# Patient Record
Sex: MALE | Race: WHITE | NOT HISPANIC OR LATINO | Employment: OTHER | URBAN - NONMETROPOLITAN AREA
[De-identification: names, ages, dates, MRNs, and addresses within clinical notes are randomized per-mention and may not be internally consistent; named-entity substitution may affect disease eponyms.]

---

## 2021-01-08 ENCOUNTER — CLINICAL SUPPORT (OUTPATIENT)
Dept: CARDIOLOGY | Facility: CLINIC | Age: 81
End: 2021-01-08

## 2021-01-08 DIAGNOSIS — I48.0 PAF (PAROXYSMAL ATRIAL FIBRILLATION) (HCC): Primary | ICD-10-CM

## 2021-01-08 LAB — INR PPP: 2.6 (ref 0.9–1.1)

## 2021-01-08 PROCEDURE — 36416 COLLJ CAPILLARY BLOOD SPEC: CPT | Performed by: INTERNAL MEDICINE

## 2021-01-08 PROCEDURE — 85610 PROTHROMBIN TIME: CPT | Performed by: INTERNAL MEDICINE

## 2021-01-08 NOTE — PROGRESS NOTES
Completed PT/INR today. Results were 2.6. Patient is within normal range. Patient will return in one month. Patient did not need refill today.

## 2021-01-22 ENCOUNTER — ANTICOAGULATION VISIT (OUTPATIENT)
Dept: CARDIOLOGY | Facility: CLINIC | Age: 81
End: 2021-01-22

## 2021-01-22 DIAGNOSIS — I48.0 PAF (PAROXYSMAL ATRIAL FIBRILLATION) (HCC): Primary | ICD-10-CM

## 2021-01-22 PROCEDURE — 85610 PROTHROMBIN TIME: CPT | Performed by: INTERNAL MEDICINE

## 2021-01-22 PROCEDURE — 36416 COLLJ CAPILLARY BLOOD SPEC: CPT | Performed by: INTERNAL MEDICINE

## 2021-02-08 ENCOUNTER — CLINICAL SUPPORT (OUTPATIENT)
Dept: CARDIOLOGY | Facility: CLINIC | Age: 81
End: 2021-02-08

## 2021-02-08 DIAGNOSIS — I10 ESSENTIAL HYPERTENSION, BENIGN: ICD-10-CM

## 2021-02-08 DIAGNOSIS — I48.0 PAF (PAROXYSMAL ATRIAL FIBRILLATION) (HCC): Primary | ICD-10-CM

## 2021-02-08 LAB — INR PPP: 2.8 (ref 0.9–1.1)

## 2021-02-08 PROCEDURE — 85610 PROTHROMBIN TIME: CPT | Performed by: INTERNAL MEDICINE

## 2021-02-08 PROCEDURE — 36416 COLLJ CAPILLARY BLOOD SPEC: CPT | Performed by: INTERNAL MEDICINE

## 2021-02-08 NOTE — PROGRESS NOTES
pts inr was 2.8 with a range of 2-3 pt reports taking 2.5mg of warfarin on tuesdays Wednesdays and Thursdays and 5mg on the remaining days. Told pt to recheck in 1 month.

## 2021-02-09 LAB
ALBUMIN SERPL-MCNC: 4 G/DL (ref 3.7–4.7)
ALBUMIN/GLOB SERPL: 1.1 {RATIO} (ref 1.2–2.2)
ALP SERPL-CCNC: 62 IU/L (ref 39–117)
ALT SERPL-CCNC: 13 IU/L (ref 0–44)
AST SERPL-CCNC: 22 IU/L (ref 0–40)
BASOPHILS # BLD AUTO: 0 X10E3/UL (ref 0–0.2)
BASOPHILS NFR BLD AUTO: 0 %
BILIRUB SERPL-MCNC: 0.4 MG/DL (ref 0–1.2)
BUN SERPL-MCNC: 24 MG/DL (ref 8–27)
BUN/CREAT SERPL: 20 (ref 10–24)
CALCIUM SERPL-MCNC: 9.2 MG/DL (ref 8.6–10.2)
CHLORIDE SERPL-SCNC: 102 MMOL/L (ref 96–106)
CHOLEST SERPL-MCNC: 155 MG/DL (ref 100–199)
CO2 SERPL-SCNC: 23 MMOL/L (ref 20–29)
CREAT SERPL-MCNC: 1.18 MG/DL (ref 0.76–1.27)
EOSINOPHIL # BLD AUTO: 0.2 X10E3/UL (ref 0–0.4)
EOSINOPHIL NFR BLD AUTO: 3 %
ERYTHROCYTE [DISTWIDTH] IN BLOOD BY AUTOMATED COUNT: 14.5 % (ref 11.6–15.4)
GLOBULIN SER CALC-MCNC: 3.6 G/DL (ref 1.5–4.5)
GLUCOSE SERPL-MCNC: 92 MG/DL (ref 65–99)
HCT VFR BLD AUTO: 32.9 % (ref 37.5–51)
HDLC SERPL-MCNC: 31 MG/DL
HGB BLD-MCNC: 10.8 G/DL (ref 13–17.7)
IMM GRANULOCYTES # BLD AUTO: 0 X10E3/UL (ref 0–0.1)
IMM GRANULOCYTES NFR BLD AUTO: 0 %
LDLC SERPL CALC-MCNC: 87 MG/DL (ref 0–99)
LYMPHOCYTES # BLD AUTO: 1.4 X10E3/UL (ref 0.7–3.1)
LYMPHOCYTES NFR BLD AUTO: 28 %
MCH RBC QN AUTO: 30 PG (ref 26.6–33)
MCHC RBC AUTO-ENTMCNC: 32.8 G/DL (ref 31.5–35.7)
MCV RBC AUTO: 91 FL (ref 79–97)
MONOCYTES # BLD AUTO: 0.6 X10E3/UL (ref 0.1–0.9)
MONOCYTES NFR BLD AUTO: 12 %
NEUTROPHILS # BLD AUTO: 2.8 X10E3/UL (ref 1.4–7)
NEUTROPHILS NFR BLD AUTO: 57 %
PLATELET # BLD AUTO: 198 X10E3/UL (ref 150–450)
POTASSIUM SERPL-SCNC: 5.6 MMOL/L (ref 3.5–5.2)
PROT SERPL-MCNC: 7.6 G/DL (ref 6–8.5)
RBC # BLD AUTO: 3.6 X10E6/UL (ref 4.14–5.8)
SODIUM SERPL-SCNC: 140 MMOL/L (ref 134–144)
TRIGL SERPL-MCNC: 216 MG/DL (ref 0–149)
VLDLC SERPL CALC-MCNC: 37 MG/DL (ref 5–40)
WBC # BLD AUTO: 4.9 X10E3/UL (ref 3.4–10.8)

## 2021-02-11 ENCOUNTER — OFFICE VISIT (OUTPATIENT)
Dept: CARDIOLOGY | Facility: CLINIC | Age: 81
End: 2021-02-11

## 2021-02-11 VITALS
RESPIRATION RATE: 12 BRPM | WEIGHT: 215 LBS | HEIGHT: 74 IN | SYSTOLIC BLOOD PRESSURE: 120 MMHG | BODY MASS INDEX: 27.59 KG/M2 | HEART RATE: 69 BPM | DIASTOLIC BLOOD PRESSURE: 54 MMHG

## 2021-02-11 DIAGNOSIS — I48.0 PAF (PAROXYSMAL ATRIAL FIBRILLATION) (HCC): Primary | ICD-10-CM

## 2021-02-11 DIAGNOSIS — E78.5 HYPERLIPIDEMIA LDL GOAL <100: ICD-10-CM

## 2021-02-11 DIAGNOSIS — I10 ESSENTIAL HYPERTENSION: ICD-10-CM

## 2021-02-11 PROCEDURE — 99443 PR PHYS/QHP TELEPHONE EVALUATION 21-30 MIN: CPT | Performed by: INTERNAL MEDICINE

## 2021-02-11 RX ORDER — CHLORAL HYDRATE 500 MG
CAPSULE ORAL
COMMUNITY

## 2021-02-11 NOTE — PROGRESS NOTES
[facility]  [facility]ARDIOLOGY  [address] [place] [address]  Dept: [phone]  Dept Fax: [phone]    Kurt Houser  1940    Televisit Note    You have chosen to receive care through a telephone visit. Do you consent to use a telephone visit for your medical care today? Yes    History of Present Illness:  Kurt Houser is a 80 y.o. male who presents via phone for Follow-up. Paroxysmal atrial fibrillation, He seems doing well, no complaints, he is off Coreg due to Bradycardia, on Warfarin, no complaints    The following portions of the patient's history were reviewed and updated as appropriate: allergies, current medications, past family history, past medical history, past social history, past surgical history and problem list.    This visit was scheduled as a phone visit to comply with patient safety concerns in accordance with CDC recommendations.  Time spent talking to  the patient was 30 minutes.     Medications  amLODIPine  B-12 capsule  cholecalciferol  CoQ-10 capsule  fish oil capsule  gabapentin  lisinopril  meloxicam  pravastatin  warfarin    Subjective  Allergies   Allergen Reactions   • Acetaminophen Mental Status Change   • Hydrocodone Bitart (Antituss) [Hydrocodone] Mental Status Change        Past Medical History:   Diagnosis Date   • Acute osteomyelitis of foot (CMS/HCC)    • Amputated toe of left foot (CMS/HCC)    • Anemia of decreased vitamin B12 absorption    • BMI 27.0-27.9,adult    • Degenerative joint disease involving multiple joints    • Diverticular disease of colon    • Drug therapy    • Dyslipidemia    • Essential hypertension    • Foot osteomyelitis (CMS/HCC)    • Frequent PVCs    • Glaucoma    • High risk medication use    • History of adenomatous polyp of colon    • Long term current use of anticoagulant    • Low density lipoprotein (LDL) cholesterol level below 100mg/dL    • Mixed hyperlipidemia    • Neuropathy    • Osteoarthritis    •  "Paroxysmal atrial fibrillation (CMS/HCC)    • Polyneuropathy    • Prediabetes    • Ventricular premature beats    • Vitamin B12 deficiency    • Vitamin D deficiency        Past Surgical History:   Procedure Laterality Date   • CATARACT EXTRACTION, BILATERAL     • FOOT SURGERY Left    • TRANS METATARSAL AMPUTATION  2020    5th metatarsal amputation/IV abx   • WRIST SURGERY Left        Family History   Problem Relation Age of Onset   • Colon cancer Father    • Liver cancer Brother         Social History     Socioeconomic History   • Marital status:      Spouse name: Not on file   • Number of children: Not on file   • Years of education: Not on file   • Highest education level: Not on file   Tobacco Use   • Smoking status: Former Smoker     Packs/day: 1.00     Years: 27.00     Pack years: 27.00     Types: Cigarettes, Cigars     Start date:      Quit date:      Years since quittin.1   • Smokeless tobacco: Never Used   • Tobacco comment: 1 cigar/day, 2.40 years   Substance and Sexual Activity   • Alcohol use: Not Currently     Frequency: Never     Comment: about every 1-2 years he mighst have a drink   • Drug use: Never   • Sexual activity: Defer       Cardiovascular Procedures    ECHO/MUGA:   STRESS TESTS:   CARDIAC CATH:   DEVICES:   HOLTER:   CT/MRI:   VASCULAR:   CARDIOTHORACIC:     Objective  Vitals:    21 1104   BP: 120/54  Comment: at home   BP Location: Left arm   Patient Position: Sitting   Cuff Size: Adult   Pulse: 69   Resp: 12   Weight: 97.5 kg (215 lb)   Height: 188 cm (74\")   PainSc: 0-No pain     Body mass index is 27.6 kg/m².    Assessment and Plan  Diagnoses and all orders for this visit:    PAF (paroxysmal atrial fibrillation) (CMS/HCC)- Asymptomatic, on warfarin, will keep same meds     Essential hypertension- The BP is fine on Lisinopril 40 mg and also Amlodipine 5mg,    Hyperlipidemia LDL goal <100- On Pravastatin, LDL is good although Try are elevated,   Elevated " K- recent la K is 5,6. First time, will repeat lab next week with EKG    Other orders  -     Omega-3 Fatty Acids (fish oil) 1000 MG capsule capsule; Take  by mouth Daily With Breakfast.        No follow-ups on file.    Buddy Javier MD  02/11/2021

## 2021-02-23 ENCOUNTER — LAB (OUTPATIENT)
Dept: CARDIOLOGY | Facility: CLINIC | Age: 81
End: 2021-02-23

## 2021-02-23 DIAGNOSIS — I10 ESSENTIAL HYPERTENSION: ICD-10-CM

## 2021-02-24 LAB
BUN SERPL-MCNC: 15 MG/DL (ref 8–27)
BUN/CREAT SERPL: 13 (ref 10–24)
CALCIUM SERPL-MCNC: 9.2 MG/DL (ref 8.6–10.2)
CHLORIDE SERPL-SCNC: 104 MMOL/L (ref 96–106)
CO2 SERPL-SCNC: 23 MMOL/L (ref 20–29)
CREAT SERPL-MCNC: 1.12 MG/DL (ref 0.76–1.27)
GLUCOSE SERPL-MCNC: 94 MG/DL (ref 65–99)
POTASSIUM SERPL-SCNC: 4.8 MMOL/L (ref 3.5–5.2)
SODIUM SERPL-SCNC: 141 MMOL/L (ref 134–144)

## 2021-02-26 ENCOUNTER — TELEPHONE (OUTPATIENT)
Dept: CARDIOLOGY | Facility: CLINIC | Age: 81
End: 2021-02-26

## 2021-03-23 ENCOUNTER — CLINICAL SUPPORT (OUTPATIENT)
Dept: CARDIOLOGY | Facility: CLINIC | Age: 81
End: 2021-03-23

## 2021-03-23 DIAGNOSIS — I48.0 PAF (PAROXYSMAL ATRIAL FIBRILLATION) (HCC): Primary | ICD-10-CM

## 2021-03-23 LAB — INR PPP: 3.2 (ref 0.9–1.1)

## 2021-03-23 PROCEDURE — 36416 COLLJ CAPILLARY BLOOD SPEC: CPT | Performed by: INTERNAL MEDICINE

## 2021-03-23 PROCEDURE — 85610 PROTHROMBIN TIME: CPT | Performed by: INTERNAL MEDICINE

## 2021-03-23 NOTE — PROGRESS NOTES
pts reported regimen is 2.5mg of warfarin on Tuesday, Wednesday, and Thursday and 5mg the rest of the week, I changed him to 2.5mg on Friday and eat more greens and to rtc in 1 week

## 2021-03-24 ENCOUNTER — ANTICOAGULATION VISIT (OUTPATIENT)
Dept: PHARMACY | Facility: HOSPITAL | Age: 81
End: 2021-03-24

## 2021-03-24 DIAGNOSIS — I48.0 PAF (PAROXYSMAL ATRIAL FIBRILLATION) (HCC): ICD-10-CM

## 2021-03-24 NOTE — PROGRESS NOTES
"Anticoagulation Clinic - Remote Progress Note  [facility]    Indication: Afib  Referring Provider: Buddy Schwarz  Initial Warfarin Start Date:   Goal INR: 2-3  Current Drug Interactions: omega three fatty acid; co-q10  [id number]:   Bleed Risk:   Other:     Diet: Mixed cooked greens/ salads 3/24/2021  Alcohol: None  Tobacco: None  OTC Pain Medication: APAP PRN    INR History:  Date 3/23         Total Weekly Dose 27.5mg         INR 3.2         Notes            Phone Interview:  Tablet Strength: 5mg  Patient Contact Info: [phone] (Mobile)  Estimated OOP cost: [please send to registration if not already done]  Verbal Release Auth:   Lab Contact Info:     Patient Findings  Negatives:  Signs/symptoms of thrombosis, Signs/symptoms of bleeding, Laboratory test error suspected, Change in health, Change in alcohol use, Change in activity, Upcoming invasive procedure, Emergency department visit, Upcoming dental procedure, Missed doses, Extra doses, Change in medications, Change in diet/appetite, Hospital admission, Bruising, Other complaints   Comments:  Instructions per [facility] appointment yesterday. \"Pts reported regimen is 2.5mg of warfarin on Tuesday, Wednesday, and Thursday and 5mg the rest of the week, I changed him to 2.5mg on Friday and eat more greens and to rtc in 1 week\"       Welcomed Kurt Houser to the [facility]. I introduced myself and discussed that we will assist with his warfarin monitoring and work with his cardiologist or other physicians to provide patient care. Encouraged patient to call the clinic with requests for warfarin refills, changes in medications / diet, change in health, or upcoming procedures / surgeries. Discussed use of OTC pain medications, and alcohol / tobacco / dietary / other drug interactions in relation to warfarin. Explained that [facility] will now be managing his INR with her cardiologist moving " forward. The method in which she tests will remain the same, however, labs will come to the [facility]. We will then call the patient with results. Advised patient that if she does not receive a phone call from [facility] within 24 hours to call the clinic to inquire about his recent INR test. Furthermore, we discussed available dates to come to the [facility] for face to face meetings. Patient was agreeable to meeting twice per year. At this time, He did not have further questions or concerns. Provided patient with the clinic's contact information for future reference.    Plan:  1. INR is slightly SUPRAtherapeutic today. Instructed pt to follow instructions per [facility]- decrease dose to 2.5mg daily except 5mg Sat-Mon. Discussed patient should not go over board with GLV and to eat them as he normally does. Will likely need to spread out high and low warfarin dosing moving forward.   2. Repeat INR 3/30 as directed by Dr. Javier's office.  3. Pt declines warfarin refills.  4. Verbal information provided over the phone to Kurt Houser. Kurt Houser expresses understanding by teach back, RBV dosing instructions, and has no further questions at this time.    Laura Ames, PharmD  3/24/2021  13:26 EDT

## 2021-03-30 ENCOUNTER — ANTICOAGULATION VISIT (OUTPATIENT)
Dept: PHARMACY | Facility: HOSPITAL | Age: 81
End: 2021-03-30

## 2021-03-30 ENCOUNTER — CLINICAL SUPPORT (OUTPATIENT)
Dept: CARDIOLOGY | Facility: CLINIC | Age: 81
End: 2021-03-30

## 2021-03-30 DIAGNOSIS — I48.0 PAF (PAROXYSMAL ATRIAL FIBRILLATION) (HCC): ICD-10-CM

## 2021-03-30 DIAGNOSIS — I48.0 PAF (PAROXYSMAL ATRIAL FIBRILLATION) (HCC): Primary | ICD-10-CM

## 2021-03-30 LAB — INR PPP: 2.7 (ref 0.9–1.1)

## 2021-03-30 PROCEDURE — 36416 COLLJ CAPILLARY BLOOD SPEC: CPT | Performed by: PHYSICIAN ASSISTANT

## 2021-03-30 PROCEDURE — 85610 PROTHROMBIN TIME: CPT | Performed by: PHYSICIAN ASSISTANT

## 2021-03-30 NOTE — PROGRESS NOTES
Anticoagulation Clinic - Remote Progress Note  [facility]    Indication: Afib  Referring Provider: Buddy Schwarz  Initial Warfarin Start Date:   Goal INR: 2-3  Current Drug Interactions: omega three fatty acid; co-q10  [id number]: 3 (HTN, Age)  Bleed Risk:   Other:     Diet: Mixed cooked greens/ salads 3/24/2021  Alcohol: None  Tobacco: None  OTC Pain Medication: APAP PRN    INR History:  Date 3/23 3/30        Total Weekly Dose 27.5mg 25mg        INR 3.2 2.7        Notes            Phone Interview:  Tablet Strength: 5mg  Patient Contact Info: [phone] (Mobile)  Estimated OOP cost: will send if patient comes to [place]   Verbal Release Auth:   Lab Contact Info:     Patient Findings  Negatives:  Signs/symptoms of thrombosis, Signs/symptoms of bleeding, Laboratory test error suspected, Change in health, Change in alcohol use, Change in activity, Upcoming invasive procedure, Emergency department visit, Upcoming dental procedure, Missed doses, Extra doses, Change in medications, Change in diet/appetite, Hospital admission, Bruising, Other complaints     Plan:  1. INR is therapeutic today. Instructed  And Mrs. Houser to spread out higher doses amongst lower doses of warfarin- therefore will take 2.5mg daily except 5mg MonWedSat.  2. Repeat INR 4/8/2021.  3. Pt declines warfarin refills.  4. Verbal information provided over the phone to Kurt Houser. Kurt Houser expresses understanding by teach back, RBV dosing instructions, and has no further questions at this time.      Laura Ames, PharmD  3/30/2021  14:16 EDT

## 2021-04-01 ENCOUNTER — TELEPHONE (OUTPATIENT)
Dept: CARDIOLOGY | Facility: CLINIC | Age: 81
End: 2021-04-01

## 2021-04-01 NOTE — TELEPHONE ENCOUNTER
Called and spoke with Mr. Houser. He was advised to repeat his INR on 4/8/21. Advised him he needed an appointment.    He would like to know what time he needs to come in? If you schedule him, I would be happy to call him back and let him know what time to be there.

## 2021-04-08 ENCOUNTER — CLINICAL SUPPORT (OUTPATIENT)
Dept: CARDIOLOGY | Facility: CLINIC | Age: 81
End: 2021-04-08

## 2021-04-08 ENCOUNTER — ANTICOAGULATION VISIT (OUTPATIENT)
Dept: PHARMACY | Facility: HOSPITAL | Age: 81
End: 2021-04-08

## 2021-04-08 DIAGNOSIS — I48.0 PAF (PAROXYSMAL ATRIAL FIBRILLATION) (HCC): ICD-10-CM

## 2021-04-08 DIAGNOSIS — I48.0 PAF (PAROXYSMAL ATRIAL FIBRILLATION) (HCC): Primary | ICD-10-CM

## 2021-04-08 LAB — INR PPP: 3.2 (ref 0.9–1.1)

## 2021-04-08 PROCEDURE — 36416 COLLJ CAPILLARY BLOOD SPEC: CPT | Performed by: INTERNAL MEDICINE

## 2021-04-08 PROCEDURE — 85610 PROTHROMBIN TIME: CPT | Performed by: INTERNAL MEDICINE

## 2021-04-08 NOTE — PROGRESS NOTES
Anticoagulation Clinic - Remote Progress Note  [facility]    Indication: Afib  Referring Provider: Buddy Schwarz  Initial Warfarin Start Date:   Goal INR: 2-3  Current Drug Interactions: omega three fatty acid; co-q10  [id number]: 3 (HTN, Age)  Bleed Risk:   Other:     Diet: QOD mixed cooked greens/ salads 4/8/21  Alcohol: None  Tobacco: None  OTC Pain Medication: APAP PRN    INR History:  Date 3/23 3/30 4/8       Total Weekly Dose 27.5mg 25mg 25 mg       INR 3.2 2.7 3.2       Notes   DAWNA orantes 4/8         Phone Interview:  Tablet Strength: 5 mg  Patient Contact Info: [phone] (Mobile)  Estimated OOP cost: will send if patient comes to [place]   Verbal Release Auth:   Lab Contact Info:     Patient Findings:  Positives:  Change in medications   Negatives:  Signs/symptoms of thrombosis, Signs/symptoms of bleeding, Laboratory test error suspected, Change in health, Change in alcohol use, Change in activity, Upcoming invasive procedure, Emergency department visit, Upcoming dental procedure, Missed doses, Extra doses, Change in diet/appetite, Hospital admission, Bruising, Other complaints   Comments:  Has been on doxycyline 100 mg BID for about 4 weeks for an infected toe nail. He says his GLV intake has been consistent. Denies any other changes.     Plan:  1. INR is supra therapeutic today. Instructed Mr. Houser to spread out higher doses amongst lower doses of warfarin- therefore will take 2.5mg daily except 5mg MonWedSat. He will eat a serving of greens tonight as well.  2. Repeat INR in one week.  3. Patient declines warfarin refills.  4. Verbal information provided over the phone to Kurt Houser. Kurt Houser expresses understanding by teach back, RBV dosing instructions, and has no further questions at this time.    Kari Ortega, Pharmacy Technician  4/8/2021  10:54 EDT     I, Laura Ames, PharmD, have reviewed the note in full and agree with the assessment and  plan.  04/08/21  12:01 EDT

## 2021-04-16 ENCOUNTER — ANTICOAGULATION VISIT (OUTPATIENT)
Dept: PHARMACY | Facility: HOSPITAL | Age: 81
End: 2021-04-16

## 2021-04-16 ENCOUNTER — CLINICAL SUPPORT (OUTPATIENT)
Dept: CARDIOLOGY | Facility: CLINIC | Age: 81
End: 2021-04-16

## 2021-04-16 DIAGNOSIS — I48.0 PAF (PAROXYSMAL ATRIAL FIBRILLATION) (HCC): Primary | ICD-10-CM

## 2021-04-16 LAB — INR PPP: 2 (ref 0.9–1.1)

## 2021-04-16 NOTE — PROGRESS NOTES
Anticoagulation Clinic - Remote Progress Note  [facility]    Indication: Afib  Referring Provider: Buddy Schwarz  Initial Warfarin Start Date:   Goal INR: 2-3  Current Drug Interactions: omega three fatty acid; co-q10  [id number]: 3 (HTN, Age)  Bleed Risk:   Other:     Diet: QOD mixed cooked greens/ salads 4/8/21  Alcohol: None  Tobacco: None  OTC Pain Medication: APAP PRN    INR History:  Date 3/23 3/30 4/8 4/16      Total Weekly Dose 27.5mg 25mg 25 mg 25 mg      INR 3.2 2.7 3.2 2.0      Notes   DAWNA orantes 4/8         Phone Interview:  Tablet Strength: 5 mg  Patient Contact Info: [phone] (Mobile)  Estimated OOP cost: will send if patient comes to [place]   Verbal Release Auth:   Lab Contact Info:     Patient Findings  Positives:  Change in diet/appetite   Negatives:  Signs/symptoms of thrombosis, Signs/symptoms of bleeding, Laboratory test error suspected, Change in health, Change in alcohol use, Change in activity, Upcoming invasive procedure, Emergency department visit, Upcoming dental procedure, Missed doses, Extra doses, Change in medications, Hospital admission, Bruising, Other complaints   Comments:  Mr. Houser reports that he has been eating two tablespoons of greens every other day. This was started last week. He is going to decrease it to one tablespoon every other day.     Plan:  1. INR is back WNL at [facility] today. Instructed Mr. Houser continue maintenance dose of warfarin 2.5 mg daily except 5 mg on MonWedSat until recheck. He plans to decrease GLV intake to one tablespoon every other day.  2. Repeat INR in one week.  3. Patient declines warfarin refills.  4. Verbal information provided over the phone to Kurt Houser. Kurt Houser expresses understanding by teach back, RBV dosing instructions, and has no further questions at this time.    Laura Ames, PharmD  4/16/2021  11:35 EDT

## 2021-04-23 ENCOUNTER — CLINICAL SUPPORT (OUTPATIENT)
Dept: CARDIOLOGY | Facility: CLINIC | Age: 81
End: 2021-04-23

## 2021-04-23 ENCOUNTER — ANTICOAGULATION VISIT (OUTPATIENT)
Dept: PHARMACY | Facility: HOSPITAL | Age: 81
End: 2021-04-23

## 2021-04-23 DIAGNOSIS — I48.0 PAF (PAROXYSMAL ATRIAL FIBRILLATION) (HCC): Primary | ICD-10-CM

## 2021-04-23 LAB — INR PPP: 2.4 (ref 0.9–1.1)

## 2021-04-23 NOTE — PROGRESS NOTES
Anticoagulation Clinic - Remote Progress Note  [facility]    Indication: Afib  Referring Provider: Buddy Schwarz  Initial Warfarin Start Date:   Goal INR: 2-3  Current Drug Interactions: omega three fatty acid; co-q10  [id number]: 3 (HTN, Age)  Bleed Risk:   Other:     Diet: QOD mixed cooked greens/ salads 4/8/21  Alcohol: None  Tobacco: None  OTC Pain Medication: APAP PRN    INR History:  Date 3/23 3/30 4/8 4/16 4/23     Total Weekly Dose 27.5mg 25mg 25 mg 25 mg 25mg     INR 3.2 2.7 3.2 2.0 2.4     Notes   DAWNA orantes 4/8         Phone Interview:  Tablet Strength: 5 mg  Patient Contact Info: [phone] (Mobile)  Estimated OOP cost: will send if patient comes to [place]   Verbal Release Auth:   Lab Contact Info:   Patient Findings    Negatives:  Signs/symptoms of thrombosis, Signs/symptoms of bleeding, Laboratory test error suspected, Change in health, Change in alcohol use, Change in activity, Upcoming invasive procedure, Emergency department visit, Upcoming dental procedure, Missed doses, Extra doses, Change in medications, Change in diet/appetite, Hospital admission, Bruising, Other complaints         Plan:  1. INR is 2.4 today.  Mr. Houser is to continue maintenance dose of warfarin 2.5 mg daily except 5 mg on MonWedSat until recheck.   2. Repeat INR in 2 weeks.  3. Verbal information provided over the phone to Kurt Houser. Kurt Houser expresses understanding by teach back, RBV dosing instructions, and has no further questions at this time.    Samira Colvin CPhT  4/23/2021  15:13 EDT     Can check q4w if WNL next encounter  I, Regine Adame, PharmD, have reviewed the note in full and agree with the assessment and plan.  04/23/21  16:20 EDT

## 2021-05-07 ENCOUNTER — CLINICAL SUPPORT (OUTPATIENT)
Dept: CARDIOLOGY | Facility: CLINIC | Age: 81
End: 2021-05-07

## 2021-05-07 ENCOUNTER — ANTICOAGULATION VISIT (OUTPATIENT)
Dept: PHARMACY | Facility: HOSPITAL | Age: 81
End: 2021-05-07

## 2021-05-07 DIAGNOSIS — I48.0 PAF (PAROXYSMAL ATRIAL FIBRILLATION) (HCC): Primary | ICD-10-CM

## 2021-05-07 LAB — INR PPP: 2.7 (ref 0.9–1.1)

## 2021-05-07 NOTE — PROGRESS NOTES
Anticoagulation Clinic - Remote Progress Note  [facility]    Indication: Afib  Referring Provider: Buddy Javier  Initial Warfarin Start Date:   Goal INR: 2.0-3.0  Current Drug Interactions: omega three fatty acid; co-q10  [id number]: 3 (HTN, Age)      Diet: QOD mixed cooked greens/ salads 4/8/21  Alcohol: None  Tobacco: None  OTC Pain Medication: APAP PRN    INR History:  Date 3/23 3/30 4/8 4/16 4/23 5/7         Total Weekly Dose 27.5mg 25mg 25mg 25mg 25mg 25mg         INR 3.2 2.7 3.2 2.0 2.4 2.7         Notes   LD doxy 4/8              Phone Interview:  Tablet Strength: 5mg  Patient Contact Info: [phone] (Mobile)  Estimated OOP cost: will send if patient comes to [place]   Verbal Release Auth:   Lab Contact Info:     Patient Findings  Negatives:  Signs/symptoms of thrombosis, Signs/symptoms of bleeding, Laboratory test error suspected, Change in health, Change in alcohol use, Change in activity, Upcoming invasive procedure, Emergency department visit, Upcoming dental procedure, Missed doses, Extra doses, Change in medications, Change in diet/appetite, Hospital admission, Bruising, Other complaints     Plan:  1. INR is therapeutic today at 2.7. Instructed Mr. Houser to continue warfarin 2.5mg oral daily except 5mg on MonWedSat until recheck.   2. Repeat INR in 4 weeks, 6/4  3. Verbal information provided over the phone. Kurt Houser RBV dosing instructions, expresses understanding by teach back, and has no further questions at this time.    Micky Barnett CPhT  5/7/2021  14:17 EDT      I, Regine Adame, PharmD, have reviewed the note in full and agree with the assessment and plan.  05/07/21  15:57 EDT

## 2021-06-04 ENCOUNTER — ANTICOAGULATION VISIT (OUTPATIENT)
Dept: PHARMACY | Facility: HOSPITAL | Age: 81
End: 2021-06-04

## 2021-06-04 ENCOUNTER — CLINICAL SUPPORT (OUTPATIENT)
Dept: CARDIOLOGY | Facility: CLINIC | Age: 81
End: 2021-06-04

## 2021-06-04 DIAGNOSIS — I48.0 PAF (PAROXYSMAL ATRIAL FIBRILLATION) (HCC): Primary | ICD-10-CM

## 2021-06-04 LAB — INR PPP: 1.8 (ref 0.9–1.1)

## 2021-06-04 NOTE — PROGRESS NOTES
Anticoagulation Clinic - Remote Progress Note  [facility]    Indication: Afib  Referring Provider: Buddy Javier  Initial Warfarin Start Date:   Goal INR: 2.0-3.0  Current Drug Interactions: omega three fatty acid; co-q10  [id number]: 3 (HTN, Age)      Diet: QOD mixed cooked greens/ salads 6/4/21  Alcohol: None  Tobacco: None  OTC Pain Medication: APAP PRN    INR History:  Date 3/23 3/30 4/8 4/16 4/23 5/7 6/4        Total Weekly Dose 27.5mg 25mg 25mg 25mg 25mg 25mg 25 mg        INR 3.2 2.7 3.2 2.0 2.4 2.7 1.8        Notes   LD doxy 4/8    Inc GLV          Phone Interview:  Tablet Strength: 5mg  Patient Contact Info: [phone] (Mobile)  Estimated OOP cost: will send if patient comes to [place]   Verbal Release Auth:   Lab Contact Info:     Patient Findings:  Positives:  Change in diet/appetite   Negatives:  Signs/symptoms of thrombosis, Signs/symptoms of bleeding, Laboratory test error suspected, Change in health, Change in alcohol use, Change in activity, Upcoming invasive procedure, Emergency department visit, Upcoming dental procedure, Missed doses, Extra doses, Change in medications, Hospital admission, Bruising, Other complaints   Comments:  Slight increase in cooked greens this week. Will resume normal diet and be consistent.     Plan:  1. INR is sub therapeutic today at 1.8. Spoke with David Retana, PharmD, and instructed Mr. Houser to continue warfarin 2.5 mg daily except 5 mg on MonWedSat until recheck.   2. Repeat INR in two weeks, 6/18.  3. Verbal information provided over the phone. Kurt Houser RBV dosing instructions, expresses understanding by teach back, and has no further questions at this time.    Kari Ortega CPhT  6/4/2021  15:57 EDT    I, Micky Yeboah, PharmD, have reviewed the note in full and agree with the assessment and plan.  06/04/21  16:14 EDT

## 2021-06-04 NOTE — PROGRESS NOTES
Anticoagulation Clinic - Remote Progress Note  [facility]    Indication: Afib  Referring Provider: Buddy Javier  Initial Warfarin Start Date:   Goal INR: 2.0-3.0  Current Drug Interactions: omega three fatty acid; co-q10  [id number]: 3 (HTN, Age)      Diet: QOD mixed cooked greens/ salads 4/8/21  Alcohol: None  Tobacco: None  OTC Pain Medication: APAP PRN    INR History:  Date 3/23 3/30 4/8 4/16 4/23 5/7 6/4        Total Weekly Dose 27.5mg 25mg 25mg 25mg 25mg 25mg 25mg        INR 3.2 2.7 3.2 2.0 2.4 2.7 1.8        Notes   LD doxverito 4/8              Phone Interview:  Tablet Strength: 5mg  Patient Contact Info: [phone] (Mobile)  Estimated OOP cost: will send if patient comes to [place]   Verbal Release Auth:   Lab Contact Info:     DISREGARD PLAN BELOW UNTIL PATIENT IS CONTACTED    Plan:  1. INR is subtherapeutic today at 1.8. Instructed Mr. Houser to continue warfarin 2.5mg oral daily except 5mg on MonWedSat until recheck.   2. Repeat INR in 4 weeks, 6/4  3. Verbal information provided over the phone. Kurt Houser RBV dosing instructions, expresses understanding by teach back, and has no further questions at this time.    Thank you,    David Retana PharmD, MARIIA  6/4/2021  13:56 EDT

## 2021-06-18 ENCOUNTER — CLINICAL SUPPORT (OUTPATIENT)
Dept: CARDIOLOGY | Facility: CLINIC | Age: 81
End: 2021-06-18

## 2021-06-18 ENCOUNTER — ANTICOAGULATION VISIT (OUTPATIENT)
Dept: PHARMACY | Facility: HOSPITAL | Age: 81
End: 2021-06-18

## 2021-06-18 DIAGNOSIS — I48.0 PAF (PAROXYSMAL ATRIAL FIBRILLATION) (HCC): Primary | ICD-10-CM

## 2021-06-18 LAB — INR PPP: 1.8 (ref 0.9–1.1)

## 2021-06-18 PROCEDURE — 36416 COLLJ CAPILLARY BLOOD SPEC: CPT | Performed by: INTERNAL MEDICINE

## 2021-06-18 PROCEDURE — 85610 PROTHROMBIN TIME: CPT | Performed by: INTERNAL MEDICINE

## 2021-06-18 NOTE — PROGRESS NOTES
Anticoagulation Clinic - Remote Progress Note  [facility]    Indication: Afib  Referring Provider: Buddy Javier  Initial Warfarin Start Date:   Goal INR: 2.0-3.0  Current Drug Interactions: omega three fatty acid; co-q10  [id number]: 3 (HTN, Age)    Diet: QOD mixed cooked greens/ salads 6/18/21  Alcohol: None  Tobacco: None  OTC Pain Medication: APAP PRN    INR History:  Date 3/23 3/30 4/8 4/16 4/23 5/7 6/4 6/18       Total Weekly Dose 27.5mg 25mg 25mg 25mg 25mg 25mg 25 mg        INR 3.2 2.7 3.2 2.0 2.4 2.7 1.8 1.8       Notes   LD doxy 4/8    Inc GLV          Phone Interview:  Tablet Strength: 5mg  Patient Contact Info: [phone] (Mobile)  Estimated OOP cost: will send if patient comes to [place]   Verbal Release Auth:   Lab Contact Info:     Patient Findings  Positives:  Change in diet/appetite   Negatives:  Signs/symptoms of thrombosis, Signs/symptoms of bleeding, Laboratory test error suspected, Change in health, Change in alcohol use, Change in activity, Upcoming invasive procedure, Emergency department visit, Upcoming dental procedure, Missed doses, Extra doses, Change in medications, Hospital admission, Bruising, Other complaints   Comments:  Mr. Houser reports that he had more GLV than he typically does. He eats about a table spoon of cooked greens every other day.      Plan:  1. INR is subtherapeutic again today at 1.8. Discussed I prefer to increase dose, however, he would prefer to decrease his GLV intake. Instructed Mr. Houser to BOOST dose of warfarin tonight to 5mg and then continue warfarin 2.5 mg daily except 5 mg on MonWedSat until recheck. If it returns SUBtherapeutic then will increase dose more permanently.   2. Repeat INR in two weeks, 7/2.  3. Verbal information provided over the phone. Kurt Houser RBV dosing instructions, expresses understanding by teach back, and has no further questions at this time.    Laura Ames, PharmD  6/18/2021  16:09 EDT

## 2021-07-02 ENCOUNTER — CLINICAL SUPPORT (OUTPATIENT)
Dept: CARDIOLOGY | Facility: CLINIC | Age: 81
End: 2021-07-02

## 2021-07-02 ENCOUNTER — ANTICOAGULATION VISIT (OUTPATIENT)
Dept: PHARMACY | Facility: HOSPITAL | Age: 81
End: 2021-07-02

## 2021-07-02 DIAGNOSIS — I48.0 PAF (PAROXYSMAL ATRIAL FIBRILLATION) (HCC): Primary | ICD-10-CM

## 2021-07-02 LAB — INR PPP: 2.3 (ref 0.9–1.1)

## 2021-07-02 PROCEDURE — 36416 COLLJ CAPILLARY BLOOD SPEC: CPT | Performed by: INTERNAL MEDICINE

## 2021-07-02 PROCEDURE — 85610 PROTHROMBIN TIME: CPT | Performed by: INTERNAL MEDICINE

## 2021-07-02 NOTE — PROGRESS NOTES
Anticoagulation Clinic - Remote Progress Note  [facility]    Indication: Afib  Referring Provider: Buddy Javier  Initial Warfarin Start Date:   Goal INR: 2.0-3.0  Current Drug Interactions: omega three fatty acid; co-q10  [id number]: 3 (HTN, Age)    Diet: QOD mixed cooked greens/ salads 7/2/21  Alcohol: None  Tobacco: None  OTC Pain Medication: APAP PRN    INR History:  Date 3/23 3/30 4/8 4/16 4/23 5/7 6/4 6/18 7/2      Total Weekly Dose 27.5mg 25mg 25mg 25mg 25mg 25mg 25 mg 25mg 25 mg      INR 3.2 2.7 3.2 2.0 2.4 2.7 1.8 1.8 2.3      Notes   LD doxy 4/8    Inc GLV  Less GLV        Phone Interview:  Tablet Strength: 5mg  Patient Contact Info: [phone] (Mobile)  Estimated OOP cost: will send if patient comes to [place]   Verbal Release Auth:   Lab Contact Info:     Patient Findings:  Positives:  Change in diet/appetite, Other complaints   Negatives:  Signs/symptoms of thrombosis, Signs/symptoms of bleeding, Laboratory test error suspected, Change in health, Change in alcohol use, Change in activity, Upcoming invasive procedure, Emergency department visit, Upcoming dental procedure, Missed doses, Extra doses, Change in medications, Hospital admission, Bruising   Comments:  Eating smaller portions of cooked greens. Otherwise denies any changes. Has been receiving a series of three shots in his knees. Last set was today. He isn't sure what they are injecting into his knees, may be gel.      Plan:  1. INR is back WNL today at 2.3. At this time, instructed Mr. Houser to continue maintenance dose of warfarin 2.5 mg daily except 5 mg on MonWedSat until recheck.   2. Repeat INR in ~ two weeks, 7/19. If WNL, would consider q4week checks.  3. Verbal information provided over the phone. Kurt Houser RBV dosing instructions, expresses understanding by teach back, and has no further questions at this time.    Kari Ortega, Evgeny  7/2/2021  16:20 EDT      I, Laura Ames, PharmD, have reviewed the note  in full and agree with the assessment and plan.  07/02/21  16:34 EDT

## 2021-07-19 ENCOUNTER — ANTICOAGULATION VISIT (OUTPATIENT)
Dept: PHARMACY | Facility: HOSPITAL | Age: 81
End: 2021-07-19

## 2021-07-19 ENCOUNTER — CLINICAL SUPPORT (OUTPATIENT)
Dept: CARDIOLOGY | Facility: CLINIC | Age: 81
End: 2021-07-19

## 2021-07-19 DIAGNOSIS — I48.0 PAF (PAROXYSMAL ATRIAL FIBRILLATION) (HCC): Primary | ICD-10-CM

## 2021-07-19 LAB — INR PPP: 1.9 (ref 0.9–1.1)

## 2021-07-19 PROCEDURE — 85610 PROTHROMBIN TIME: CPT | Performed by: INTERNAL MEDICINE

## 2021-07-19 PROCEDURE — 36416 COLLJ CAPILLARY BLOOD SPEC: CPT | Performed by: INTERNAL MEDICINE

## 2021-07-19 NOTE — PROGRESS NOTES
Anticoagulation Clinic - Remote Progress Note  [facility]    Indication: Afib  Referring Provider: Buddy Javier  Initial Warfarin Start Date:   Goal INR: 2.0-3.0  Current Drug Interactions: omega three fatty acid; co-q10  [id number]: 3 (HTN, Age)    Diet: QOD mixed cooked greens/ salads 7/2/21  Alcohol: None  Tobacco: None  OTC Pain Medication: APAP PRN    INR History:  Date 3/23 3/30 4/8 4/16 4/23 5/7 6/4 6/18 7/2 7/19     Total Weekly Dose 27.5mg 25mg 25mg 25mg 25mg 25mg 25 mg 25mg 25 mg 25 mg     INR 3.2 2.7 3.2 2.0 2.4 2.7 1.8 1.8 2.3 1.9     Notes   LD doxy 4/8    Inc GLV  Less GLV Inc GLV        Phone Interview:  Tablet Strength: 5mg  Patient Contact Info: [phone] (Mobile)  Estimated OOP cost: will send if patient comes to [place]   Verbal Release Auth:   Lab Contact Info:     Patient Findings:  Positives:  Change in diet/appetite   Negatives:  Signs/symptoms of thrombosis, Signs/symptoms of bleeding, Laboratory test error suspected, Change in health, Change in alcohol use, Change in activity, Upcoming invasive procedure, Emergency department visit, Upcoming dental procedure, Missed doses, Extra doses, Change in medications, Hospital admission, Bruising, Other complaints   Comments:  Patient stated that he had more greens last week than usual (lettuce, mixed greens, yfn slaw, and green beans).  He does not plan to eat as many greens this week.     Plan:  1. INR was Subtherapeutic yesterday at 1.9. At this time, instructed Mr. Houser to continue maintenance dose of warfarin 2.5 mg daily except 5 mg on MonWedSat AND will avoid GLV tonight. Patient said he was not going to eat as many greens this week.    2. Repeat INR in 1 week from last INR, 7/26. If subtherapeutic, an increase in his regimen may be needed, as his green vegetable consumption is not consistent.   3. Verbal information provided over the phone. Kurt Houser RBV dosing instructions, expresses understanding by teach back, and  has no further questions at this time.    Willian Lopez, Pharmacy Intern  7/19/2021  16:34 EDT    I, Laura Ames, PharmD, have reviewed the note in full and agree with the assessment and plan.  07/20/21  11:40 EDT

## 2021-07-28 ENCOUNTER — CLINICAL SUPPORT (OUTPATIENT)
Dept: CARDIOLOGY | Facility: CLINIC | Age: 81
End: 2021-07-28

## 2021-07-28 ENCOUNTER — ANTICOAGULATION VISIT (OUTPATIENT)
Dept: PHARMACY | Facility: HOSPITAL | Age: 81
End: 2021-07-28

## 2021-07-28 DIAGNOSIS — I48.0 PAF (PAROXYSMAL ATRIAL FIBRILLATION) (HCC): Primary | ICD-10-CM

## 2021-07-28 LAB — INR PPP: 1.9 (ref 0.9–1.1)

## 2021-07-28 NOTE — PROGRESS NOTES
Anticoagulation Clinic - Remote Progress Note  [facility]    Indication: Afib  Referring Provider: Buddy Javier  Initial Warfarin Start Date:   Goal INR: 2.0-3.0  Current Drug Interactions: omega three fatty acid; co-q10  [id number]: 3 (HTN, Age)    Diet: QOD mixed cooked greens/ salads 7/2/21  Alcohol: None  Tobacco: None  OTC Pain Medication: APAP PRN    INR History:  Date 3/23 3/30 4/8 4/16 4/23 5/7 6/4 6/18 7/2 7/19 7/28    Total Weekly Dose 27.5mg 25mg 25mg 25mg 25mg 25mg 25 mg 25mg 25 mg 25 mg 25 mg    INR 3.2 2.7 3.2 2.0 2.4 2.7 1.8 1.8 2.3 1.9 1.9    Notes   LD doxy 4/8    Inc GLV  Less GLV Inc GLV  Inc GLV      Phone Interview:  Tablet Strength: 5mg  Patient Contact Info: [phone] (Mobile)  Estimated OOP cost: will send if patient comes to [place]   Verbal Release Auth:   Lab Contact Info:     Patient Findings  Positives:  Change in diet/appetite   Negatives:  Signs/symptoms of thrombosis, Signs/symptoms of bleeding, Laboratory test error suspected, Change in health, Change in alcohol use, Change in activity, Upcoming invasive procedure, Emergency department visit, Upcoming dental procedure, Missed doses, Extra doses, Change in medications, Hospital admission, Bruising, Other complaints   Comments:  He eats GLV about every other day.  He feels like he ate less.     He got a list of foods that contain vitamin K yesterday from Dr. Javier's office.  He stated that he will no longer eats cooked greens.   Discussed the importance of consistent intake of foods that contain vitamin K each week.  Suggested keeping a food log initially.     Otherwise, above findings negative     Plan:  1. INR is SUBtherapeutic yesterday at 1.9.  Unable to reach Mr. Houser until today.  At this time, instructed Mr. Houser to continue maintenance dose of warfarin 2.5 mg oral daily except 5 mg on MonWedSat. Mr. Houser plans to monitor intake of foods that contain vitamin K.  2. Repeat INR in two weeks on 8/11  per patient preference.   3. Verbal information provided over the phone. Kurt Houser RBV dosing instructions, expresses understanding by teach back, and has no further questions at this time.    Amisha Santana, PharmD  7/28/2021  14:46 EDT

## 2021-08-11 ENCOUNTER — ANTICOAGULATION VISIT (OUTPATIENT)
Dept: PHARMACY | Facility: HOSPITAL | Age: 81
End: 2021-08-11

## 2021-08-11 DIAGNOSIS — I48.0 PAF (PAROXYSMAL ATRIAL FIBRILLATION) (HCC): Primary | ICD-10-CM

## 2021-08-12 NOTE — PROGRESS NOTES
Anticoagulation Clinic - Remote Progress Note  [facility]    Indication: paroxysmal afib  Referring Provider: Bdudy Javier  Initial Warfarin Start Date:   Goal INR: 2.0-3.0  Current Drug Interactions: omega three fatty acid; co-q10  [id number]: 3 (HTN, Age)    Diet: QOD mixed cooked greens / salads 7/2/21  Alcohol: None  Tobacco: None  OTC Pain Medication: APAP PRN    INR History:  Date 3/23 3/30 4/8 4/16 4/23 5/7 6/4 6/18 7/2 7/19 7/28 8/11   Total Weekly Dose 27.5mg 25mg 25mg 25mg 25mg 25mg 25 mg 25mg 25mg 25mg 25mg 25mg   INR 3.2 2.7 3.2 2.0 2.4 2.7 1.8 1.8 2.3 1.9 1.9 2.6   Notes   LD doxy 4/8    incr GLV  decr GLV incr GLV  incr GLV decr GLV     Date               Total WeeklyDose               INR               Notes                   Phone Interview:  Tablet Strength: 5mg  Patient Contact Info: [phone] (Mobile)  Estimated OOP cost: will send if patient comes to [place]   Verbal Release Auth:   Lab Contact Info:     Patient Findings  Positives:  Change in diet/appetite   Negatives:  Signs/symptoms of thrombosis, Signs/symptoms of bleeding, Laboratory test error suspected, Change in health, Change in alcohol use, Change in activity, Upcoming invasive procedure, Emergency department visit, Upcoming dental procedure, Missed doses, Extra doses, Change in medications, Hospital admission, Bruising, Other complaints   Comments:  Patient reports he has not eaten as much mixed greens recently, had previously been eating QOD. He is very agreeable to continue with current GLV intake which includes servings of green beans, peas, and salads. He may have mixed greens >1x/week. Otherwise denies findings.         Plan:  1. INR was therapeutic and back WNL yesterday at 2.6. Instructed Mr. Houser to continue with current GLV intake and then continue maintenance dose of warfarin 2.5mg oral daily except 5mg on MonWedSat until recheck  2. Repeat INR in two weeks, 8/25, to ensure WNL  3. Verbal information  provided over the phone. Kurt Houser RBV dosing instructions, expresses understanding by teach back, and has no further questions at this time.    Micky Barnett  8/12/2021  09:45 EDT       I, Micky Yeboah, PharmD, have reviewed the note in full and agree with the assessment and plan.  08/12/21  10:14 EDT

## 2021-08-25 ENCOUNTER — CLINICAL SUPPORT (OUTPATIENT)
Dept: CARDIOLOGY | Facility: CLINIC | Age: 81
End: 2021-08-25

## 2021-08-25 ENCOUNTER — ANTICOAGULATION VISIT (OUTPATIENT)
Dept: PHARMACY | Facility: HOSPITAL | Age: 81
End: 2021-08-25

## 2021-08-25 DIAGNOSIS — I48.0 PAF (PAROXYSMAL ATRIAL FIBRILLATION) (HCC): Primary | ICD-10-CM

## 2021-08-25 LAB — INR PPP: 2.1 (ref 0.9–1.1)

## 2021-08-25 NOTE — PROGRESS NOTES
Anticoagulation Clinic - Remote Progress Note  [facility]    Indication: paroxysmal afib  Referring Provider: Buddy Javier  Initial Warfarin Start Date:   Goal INR: 2.0-3.0  Current Drug Interactions: omega three fatty acid; co-q10  [id number]: 3 (HTN, Age)    Diet: green beans 2x weekly 8/25/21  Alcohol: None  Tobacco: None  OTC Pain Medication: APAP PRN    INR History:  Date 3/23 3/30 4/8 4/16 4/23 5/7 6/4 6/18 7/2 7/19 7/28 8/11   Total Weekly Dose 27.5mg 25mg 25mg 25mg 25mg 25mg 25 mg 25mg 25mg 25mg 25mg 25mg   INR 3.2 2.7 3.2 2.0 2.4 2.7 1.8 1.8 2.3 1.9 1.9 2.6   Notes   LD doxy 4/8    incr GLV  decr GLV incr GLV  incr GLV decr GLV     Date 8/25              Total WeeklyDose 25mg              INR 2.1              Notes                   Phone Interview:  Tablet Strength: 5mg  Patient Contact Info: [phone] (Mobile)  Estimated OOP cost: will send if patient comes to [place]   Verbal Release Auth:   Lab Contact Info:       Plan:  1. INR was therapeutic and back WNL yesterday at 2.1. Instructed Mr. Houser to continue with current GLV intake and then continue maintenance dose of warfarin 2.5mg oral daily except 5mg on MonWedSat until recheck  2. Repeat INR 9/22  3. Verbal information provided over the phone. Kurt Houser RBV dosing instructions, expresses understanding by teach back, and has no further questions at this time.    Thank you,    David Del AngelD, MARIIA, BCPS  8/25/2021  16:04 EDT

## 2021-09-22 ENCOUNTER — CLINICAL SUPPORT (OUTPATIENT)
Dept: CARDIOLOGY | Facility: CLINIC | Age: 81
End: 2021-09-22

## 2021-09-22 ENCOUNTER — ANTICOAGULATION VISIT (OUTPATIENT)
Dept: PHARMACY | Facility: HOSPITAL | Age: 81
End: 2021-09-22

## 2021-09-22 DIAGNOSIS — I48.0 PAF (PAROXYSMAL ATRIAL FIBRILLATION) (HCC): Primary | ICD-10-CM

## 2021-09-22 LAB — INR PPP: 2.8 (ref 0.9–1.1)

## 2021-09-22 PROCEDURE — 36416 COLLJ CAPILLARY BLOOD SPEC: CPT | Performed by: INTERNAL MEDICINE

## 2021-09-22 PROCEDURE — 85610 PROTHROMBIN TIME: CPT | Performed by: INTERNAL MEDICINE

## 2021-09-22 NOTE — PROGRESS NOTES
Anticoagulation Clinic - Remote Progress Note  [facility]    Indication: paroxysmal afib  Referring Provider: Buddy Javier  Initial Warfarin Start Date:   Goal INR: 2.0-3.0  Current Drug Interactions: omega three fatty acid; CoQ-10   [id number]: 3 (HTN, Age)    Diet: green beans 2x weekly 8/25/21  Alcohol: None  Tobacco: None  OTC Pain Medication: APAP PRN    INR History:  Date 3/23 3/30 4/8 4/16 4/23 5/7 6/4 6/18 7/2 7/19 7/28 8/11   Total Weekly Dose 27.5mg 25mg 25mg 25mg 25mg 25mg 25mg 25mg 25mg 25mg 25mg 25mg   INR 3.2 2.7 3.2 2.0 2.4 2.7 1.8 1.8 2.3 1.9 1.9 2.6   Notes   LD doxy 4/8    incr GLV  decr GLV incr GLV  incr GLV decr GLV     Date 8/25 9/22             Total WeeklyDose 25mg 25mg             INR 2.1 2.8             Notes                 Phone Interview:  Tablet Strength: 5mg  Patient Contact Info: [phone] (Mobile)  Estimated OOP cost: will send if patient comes to [place]   Verbal Release Auth:   Lab Contact Info: [facility] POCT    Patient Findings  Negatives:  Signs/symptoms of thrombosis, Signs/symptoms of bleeding, Laboratory test error suspected, Change in health, Change in alcohol use, Change in activity, Upcoming invasive procedure, Emergency department visit, Upcoming dental procedure, Missed doses, Extra doses, Change in medications, Change in diet/appetite, Hospital admission, Bruising, Other complaints     Plan:  1. INR is therapeutic today at 2.8. Instructed Mr. Houser to continue maintenance dose of warfarin 2.5mg oral daily except 5mg on MonWedSat until recheck  2. Repeat INR in 4 weeks, 10/20  3. Verbal information provided over the phone. Kurt Houser RBV dosing instructions, expresses understanding by teach back, and has no further questions at this time.    Micky Barnett, Evgeny  9/22/2021  15:07 EDT    I, David Retana, PharmD, have reviewed the note in full and agree with the assessment and plan.  09/22/21  15:39 EDT

## 2021-10-20 ENCOUNTER — CLINICAL SUPPORT (OUTPATIENT)
Dept: CARDIOLOGY | Facility: CLINIC | Age: 81
End: 2021-10-20

## 2021-10-20 ENCOUNTER — ANTICOAGULATION VISIT (OUTPATIENT)
Dept: PHARMACY | Facility: HOSPITAL | Age: 81
End: 2021-10-20

## 2021-10-20 DIAGNOSIS — I48.0 PAF (PAROXYSMAL ATRIAL FIBRILLATION) (HCC): Primary | ICD-10-CM

## 2021-10-20 LAB — INR PPP: 2.8 (ref 0.9–1.1)

## 2021-10-20 NOTE — PROGRESS NOTES
Anticoagulation Clinic - Remote Progress Note  [facility]    Indication: paroxysmal afib  Referring Provider: Buddy Javier  Initial Warfarin Start Date:   Goal INR: 2.0-3.0  Current Drug Interactions: omega three fatty acid; CoQ-10   [id number]: 3 (HTN, Age)    Diet: green beans 2x weekly 8/25/21  Alcohol: None  Tobacco: None  OTC Pain Medication: APAP PRN    INR History:  Date 3/23 3/30 4/8 4/16 4/23 5/7 6/4 6/18 7/2 7/19 7/28 8/11   Total Weekly Dose 27.5mg 25mg 25mg 25mg 25mg 25mg 25mg 25mg 25mg 25mg 25mg 25mg   INR 3.2 2.7 3.2 2.0 2.4 2.7 1.8 1.8 2.3 1.9 1.9 2.6   Notes   LD doxy 4/8    incr GLV  decr GLV incr GLV  incr GLV decr GLV     Date 8/25 9/22 10/20            Total WeeklyDose 25mg 25mg 25mg            INR 2.1 2.8 2.8            Notes                 Phone Interview:  Tablet Strength: 5mg  Patient Contact Info: [phone] (Mobile)  Estimated OOP cost: will send if patient comes to [place]   Verbal Release Auth:   Lab Contact Info: [facility] POCT    Patient Findings  Negatives:  Signs/symptoms of thrombosis, Signs/symptoms of bleeding, Laboratory test error suspected, Change in health, Change in alcohol use, Change in activity, Upcoming invasive procedure, Emergency department visit, Upcoming dental procedure, Missed doses, Extra doses, Change in medications, Change in diet/appetite, Hospital admission, Bruising, Other complaints       Plan:  1. INR is therapeutic today, again at 2.8. Instructed Mr. Houser to continue maintenance dose of warfarin 2.5mg oral daily except 5mg on MonWedSat until recheck  2. Repeat INR in 4 weeks, 11/17  3. Verbal information provided over the phone. Kurt Houser RBV dosing instructions, expresses understanding by teach back, and has no further questions at this time.    Micky Barnett, Evgeny  10/20/2021  14:09 EDT     I, Violet Frazier, PharmD, have reviewed the note in full and agree with the assessment and plan.  10/21/21  16:16 EDT

## 2021-11-18 ENCOUNTER — CLINICAL SUPPORT (OUTPATIENT)
Dept: CARDIOLOGY | Facility: CLINIC | Age: 81
End: 2021-11-18

## 2021-11-18 ENCOUNTER — ANTICOAGULATION VISIT (OUTPATIENT)
Dept: PHARMACY | Facility: HOSPITAL | Age: 81
End: 2021-11-18

## 2021-11-18 DIAGNOSIS — I48.0 PAF (PAROXYSMAL ATRIAL FIBRILLATION) (HCC): Primary | ICD-10-CM

## 2021-11-18 LAB — INR PPP: 3.1 (ref 0.9–1.1)

## 2021-11-18 PROCEDURE — 36416 COLLJ CAPILLARY BLOOD SPEC: CPT | Performed by: INTERNAL MEDICINE

## 2021-11-18 PROCEDURE — 85610 PROTHROMBIN TIME: CPT | Performed by: INTERNAL MEDICINE

## 2021-11-18 NOTE — PROGRESS NOTES
Anticoagulation Clinic - Remote Progress Note  Remote Lab    Indication: paroxysmal afib  Referring Provider: Buddy Javier  Initial Warfarin Start Date:   Goal INR: 2.0-3.0  Current Drug Interactions: omega three fatty acid; CoQ-10   [id number]: 3 (HTN, Age)    Diet: ~ Tablespoon QOD mixed cooked greens; green beans 2x weekly 11/19/2021  Alcohol: None  Tobacco: None  OTC Pain Medication: APAP PRN    INR History:  Date 3/23 3/30 4/8 4/16 4/23 5/7 6/4 6/18 7/2 7/19 7/28 8/11   Total Weekly Dose 27.5mg 25mg 25mg 25mg 25mg 25mg 25mg 25mg 25mg 25mg 25mg 25mg   INR 3.2 2.7 3.2 2.0 2.4 2.7 1.8 1.8 2.3 1.9 1.9 2.6   Notes   LD doxy 4/8    incr GLV  decr GLV incr GLV  incr GLV decr GLV     Date 8/25 9/22 10/20 11/18           Total WeeklyDose 25mg 25mg 25mg 25mg           INR 2.1 2.8 2.8 3.1           Notes    Less GLV             Phone Interview:  Tablet Strength: 5mg  Patient Contact Info: [phone] (Mobile)  Estimated OOP cost: will send if patient comes to [place]   Verbal Release Auth:   Lab Contact Info: [facility] POCT    Patient Findings  Positives:  Change in diet/appetite   Negatives:  Signs/symptoms of thrombosis, Signs/symptoms of bleeding, Laboratory test error suspected, Change in health, Change in alcohol use, Change in activity, Upcoming invasive procedure, Emergency department visit, Upcoming dental procedure, Missed doses, Extra doses, Change in medications, Hospital admission, Bruising, Other complaints   Comments:  He reports that he used to cooked greens QOD but hasn't done that for 2-3 weeks. Will plan to eat a serving tonight and then continue normal diet.     Plan:  1. INR is slightly supratherapeutic today at 3.1. Unable to get in touch with Mr. Houser until 11/19. Instructed Mr. Houser to eat a serving (2 tablespoons of cooked mixed greens) continue maintenance dose of warfarin 2.5mg oral daily except 5mg on MonWedSat until recheck  2. Repeat INR in 2 weeks 11/29 with   Concepcion's appointment.  3. Verbal information provided over the phone. Kurt Hosuer RBV dosing instructions, expresses understanding by teach back, and has no further questions at this time.    Laura Ames, PharmD  11/18/2021  14:43 EST

## 2021-11-29 ENCOUNTER — ANTICOAGULATION VISIT (OUTPATIENT)
Dept: PHARMACY | Facility: HOSPITAL | Age: 81
End: 2021-11-29

## 2021-11-29 ENCOUNTER — OFFICE VISIT (OUTPATIENT)
Dept: CARDIOLOGY | Facility: CLINIC | Age: 81
End: 2021-11-29

## 2021-11-29 VITALS
BODY MASS INDEX: 28.88 KG/M2 | TEMPERATURE: 97 F | HEART RATE: 58 BPM | DIASTOLIC BLOOD PRESSURE: 72 MMHG | SYSTOLIC BLOOD PRESSURE: 136 MMHG | HEIGHT: 74 IN | OXYGEN SATURATION: 99 % | WEIGHT: 225 LBS | RESPIRATION RATE: 11 BRPM

## 2021-11-29 DIAGNOSIS — I48.0 PAF (PAROXYSMAL ATRIAL FIBRILLATION) (HCC): Primary | ICD-10-CM

## 2021-11-29 DIAGNOSIS — I10 ESSENTIAL HYPERTENSION: ICD-10-CM

## 2021-11-29 DIAGNOSIS — E78.5 HYPERLIPIDEMIA LDL GOAL <100: ICD-10-CM

## 2021-11-29 LAB — INR PPP: 2.4 (ref 0.9–1.1)

## 2021-11-29 PROCEDURE — 99214 OFFICE O/P EST MOD 30 MIN: CPT | Performed by: INTERNAL MEDICINE

## 2021-11-29 NOTE — PROGRESS NOTES
[facility]  [facility]  [address] [place] [address]  Dept: [phone]  Dept Fax: [phone]    Kurt Houser  1940    Follow Up Office Visit Note    History of Present Illness:  Kurt Houser is a 81 y.o. male who presents to the clinic for Follow-up. Paroxysmal atrial fibrillation- He is asymptomatic, only on warfarin, no AV blockers    The following portions of the patient's history were reviewed and updated as appropriate: allergies, current medications, past family history, past medical history, past social history, past surgical history and problem list.    Medications:  amLODIPine  B-12 capsule  cholecalciferol  CoQ-10 capsule  fish oil capsule  gabapentin  lisinopril  meloxicam  pravastatin  warfarin    Subjective  Allergies   Allergen Reactions   • Acetaminophen Mental Status Change   • Hydrocodone Bitart (Antituss) [Hydrocodone] Mental Status Change        Past Medical History:   Diagnosis Date   • Acute osteomyelitis of foot (HCC)    • Amputated toe of left foot (HCC)    • Anemia of decreased vitamin B12 absorption    • BMI 27.0-27.9,adult    • Degenerative joint disease involving multiple joints    • Diverticular disease of colon    • Drug therapy    • Dyslipidemia    • Essential hypertension    • Foot osteomyelitis (HCC)    • Frequent PVCs    • Glaucoma    • High risk medication use    • History of adenomatous polyp of colon    • Long term current use of anticoagulant    • Low density lipoprotein (LDL) cholesterol level below 100mg/dL    • Mixed hyperlipidemia    • Neuropathy    • Osteoarthritis    • Paroxysmal atrial fibrillation (HCC)    • Polyneuropathy    • Prediabetes    • Ventricular premature beats    • Vitamin B12 deficiency    • Vitamin D deficiency        Past Surgical History:   Procedure Laterality Date   • CATARACT EXTRACTION, BILATERAL  2010   • FOOT SURGERY Left    • TRANS METATARSAL AMPUTATION  06/03/2020    5th metatarsal  "amputation/IV abx   • WRIST SURGERY Left        Family History   Problem Relation Age of Onset   • Colon cancer Father    • Liver cancer Brother         Social History     Socioeconomic History   • Marital status:    Tobacco Use   • Smoking status: Former Smoker     Packs/day: 1.00     Years: 27.00     Pack years: 27.00     Types: Cigarettes, Cigars     Start date:      Quit date:      Years since quittin.9   • Smokeless tobacco: Never Used   • Tobacco comment: 1 cigar/day, 2.40 years   Vaping Use   • Vaping Use: Never used   Substance and Sexual Activity   • Alcohol use: Not Currently     Comment: about every 1-2 years he mighst have a drink   • Drug use: Never   • Sexual activity: Defer       Review of Systems   Constitutional: Negative.    HENT: Negative.    Respiratory: Negative.    Cardiovascular: Negative.    Endocrine: Negative.    Genitourinary: Negative.    Musculoskeletal: Negative.    Skin: Negative.    Allergic/Immunologic: Negative.    Neurological: Negative.    Hematological: Negative.    Psychiatric/Behavioral: Negative.    All other systems reviewed and are negative.      Cardiovascular Procedures    ECHO/MUGA:   STRESS TESTS:   CARDIAC CATH:   DEVICES:   HOLTER:   CT/MRI:   VASCULAR:   CARDIOTHORACIC:     Objective  Vitals:    21 1429   BP: 136/72   BP Location: Right arm   Patient Position: Sitting   Cuff Size: Adult   Pulse: 58   Resp: 11   Temp: 97 °F (36.1 °C)   TempSrc: Infrared   SpO2: 99%   Weight: 102 kg (225 lb)   Height: 188 cm (74\")   PainSc: 0-No pain     Body mass index is 28.89 kg/m².     Physical Exam  Constitutional:       Appearance: Healthy appearance. Not in distress.   Neck:      Vascular: No JVR. JVD normal.   Pulmonary:      Effort: Pulmonary effort is normal.      Breath sounds: Normal breath sounds. No wheezing. No rhonchi. No rales.   Chest:      Chest wall: Not tender to palpatation.   Cardiovascular:      PMI at left midclavicular line. Normal " rate. Regular rhythm. Normal S1. Normal S2.      Murmurs: There is no murmur.      No gallop. No click. No rub.   Pulses:     Intact distal pulses.   Edema:     Peripheral edema absent.   Abdominal:      General: Bowel sounds are normal.      Palpations: Abdomen is soft.      Tenderness: There is no abdominal tenderness.   Musculoskeletal: Normal range of motion.         General: No tenderness. Skin:     General: Skin is warm and dry.   Neurological:      General: No focal deficit present.      Mental Status: Alert and oriented to person, place and time.          Diagnostic Data  Procedures    Assessment and Plan  Diagnoses and all orders for this visit:    PAF (paroxysmal atrial fibrillation) (HCC)- He seems sinus on Warfarin, no AV blockers, due to bradycardia   -     POC Protime / INR    Essential hypertension- BP is 142.80 on Lisinopril 40 mg and also Amlodipine 5 mg     Hyperlipidemia LDL goal <100- On Pravastatin 40 mg LDL is 82          No follow-ups on file.    Buddy Javier MD  11/29/2021

## 2021-11-29 NOTE — PROGRESS NOTES
Anticoagulation Clinic - Remote Progress Note  Remote Lab    Indication: paroxysmal afib  Referring Provider: Buddy Javier  Initial Warfarin Start Date:   Goal INR: 2.0-3.0  Current Drug Interactions: omega three fatty acid; CoQ-10   [id number]: 3 (HTN, Age)    Diet: ~ Tablespoon QOD mixed cooked greens; green beans 2x weekly 11/19/2021  Alcohol: None  Tobacco: None  OTC Pain Medication: APAP PRN    INR History:  Date 3/23 3/30 4/8 4/16 4/23 5/7 6/4 6/18 7/2 7/19 7/28 8/11   Total Weekly Dose 27.5mg 25mg 25mg 25mg 25mg 25mg 25mg 25mg 25mg 25mg 25mg 25mg   INR 3.2 2.7 3.2 2.0 2.4 2.7 1.8 1.8 2.3 1.9 1.9 2.6   Notes   LD doxy 4/8    incr GLV  decr GLV incr GLV  incr GLV decr GLV     Date 8/25 9/22 10/20 11/18 11/29          Total WeeklyDose 25mg 25mg 25mg 25mg 25mg          INR 2.1 2.8 2.8 3.1 2.4          Notes    Less GLV             Phone Interview:  Tablet Strength: 5mg  Patient Contact Info: [phone] (Mobile)  Estimated OOP cost: will send if patient comes to [place]   Verbal Release Auth:   Lab Contact Info: [facility] POCT    Patient Findings  Negatives:  Signs/symptoms of thrombosis, Signs/symptoms of bleeding, Laboratory test error suspected, Change in health, Change in alcohol use, Change in activity, Upcoming invasive procedure, Emergency department visit, Upcoming dental procedure, Missed doses, Extra doses, Change in medications, Change in diet/appetite, Hospital admission, Bruising, Other complaints   Comments:  He resumed his normal GLV.     Plan:  1. INR is slightly supratherapeutic today at 2.4. Instructed Mr. Houser continue maintenance dose of warfarin 2.5mg oral daily except 5mg on MonWedSat.  2. Repeat INR in 4 weeks 12/28 with Dr. Javier's appointment.  3. Verbal information provided over the phone. Kurt Houser RBV dosing instructions, expresses understanding by teach back, and has no further questions at this time.    Laura Ames, PharmD  11/29/2021  15:57 EST

## 2021-11-30 NOTE — PROGRESS NOTES
Anticoagulation Clinic - Remote Progress Note  [facility]    Indication: Afib  Referring Provider: Buddy Javier  Initial Warfarin Start Date:   Goal INR: 2.0-3.0  Current Drug Interactions: omega three fatty acid; co-q10  [id number]: 3 (HTN, Age)    Diet: QOD mixed cooked greens/ salads 7/2/21  Alcohol: None  Tobacco: None  OTC Pain Medication: APAP PRN    INR History:  Date 3/23 3/30 4/8 4/16 4/23 5/7 6/4 6/18 7/2 7/19 7/28 8/11   Total Weekly Dose 27.5mg 25mg 25mg 25mg 25mg 25mg 25 mg 25mg 25 mg 25 mg 25 mg 25mg   INR 3.2 2.7 3.2 2.0 2.4 2.7 1.8 1.8 2.3 1.9 1.9 2.6   Notes   LD doxy 4/8    Inc GLV  Less GLV Inc GLV  Inc GLV      Phone Interview:  Tablet Strength: 5mg  Patient Contact Info: [phone] (Mobile)  Estimated OOP cost: will send if patient comes to [place]   Verbal Release Auth:   Lab Contact Info:         Plan:  1. INR is SUBtherapeutic yesterday at 2.6. UNABLE TO GET IN CONTACT WITH THE PATIENT. PLEASE DISREGARD THE FOLLOWING PLAN UNTIL ABLE TO GET IN CONTACT WITH PATIENT/ PATIENT REPRESENTATIVE.         At this time, instructed Mr. Houser to continue maintenance dose of warfarin 2.5 mg oral daily except 5 mg on MonWedSat.   2. Repeat INR in two weeks on 8/11 per patient preference.   3. Verbal information provided over the phone. Kurt Houser RBV dosing instructions, expresses understanding by teach back, and has no further questions at this time.    Laura Ames, PharmD  8/11/2021  13:17 EDT   Moderna dose 1 and 2

## 2021-12-28 ENCOUNTER — CLINICAL SUPPORT (OUTPATIENT)
Dept: CARDIOLOGY | Facility: CLINIC | Age: 81
End: 2021-12-28

## 2021-12-28 ENCOUNTER — ANTICOAGULATION VISIT (OUTPATIENT)
Dept: PHARMACY | Facility: HOSPITAL | Age: 81
End: 2021-12-28

## 2021-12-28 DIAGNOSIS — I48.0 PAF (PAROXYSMAL ATRIAL FIBRILLATION) (HCC): Primary | ICD-10-CM

## 2021-12-28 LAB — INR PPP: 2 (ref 0.9–1.1)

## 2021-12-28 PROCEDURE — 36416 COLLJ CAPILLARY BLOOD SPEC: CPT

## 2021-12-28 PROCEDURE — 85610 PROTHROMBIN TIME: CPT

## 2021-12-28 NOTE — PROGRESS NOTES
Anticoagulation Clinic - Remote Progress Note  Remote Lab    Indication: paroxysmal afib  Referring Provider: Buddy Javier  Initial Warfarin Start Date:   Goal INR: 2.0-3.0  Current Drug Interactions: omega three fatty acid; CoQ-10   [id number]: 3 (HTN, Age)    Diet: ~ Tablespoon QOD mixed cooked greens; green beans 2x weekly 12/28/2021  Alcohol: None  Tobacco: None  OTC Pain Medication: APAP PRN    INR History:  Date 3/23 3/30 4/8 4/16 4/23 5/7 6/4 6/18 7/2 7/19 7/28 8/11   Total Weekly Dose 27.5mg 25mg 25mg 25mg 25mg 25mg 25mg 25mg 25mg 25mg 25mg 25mg   INR 3.2 2.7 3.2 2.0 2.4 2.7 1.8 1.8 2.3 1.9 1.9 2.6   Notes   LD doxy 4/8    incr GLV  decr GLV incr GLV  incr GLV decr GLV     Date 8/25 9/22 10/20 11/18 11/29 12/28         Total WeeklyDose 25mg 25mg 25mg 25mg 25mg 25mg         INR 2.1 2.8 2.8 3.1 2.4 2.0         Notes    Less GLV             Phone Interview:  Tablet Strength: 5mg  Patient Contact Info: [phone] (Mobile)  Estimated OOP cost: will send if patient comes to [place]   Verbal Release Auth:   Lab Contact Info: [facility] POCT    Patient Findings    Negatives:  Signs/symptoms of thrombosis, Signs/symptoms of bleeding, Laboratory test error suspected, Change in health, Change in alcohol use, Change in activity, Upcoming invasive procedure, Emergency department visit, Upcoming dental procedure, Missed doses, Extra doses, Change in medications, Change in diet/appetite, Hospital admission, Bruising, Other complaints       Plan:  1. INR is slightly supratherapeutic today at 2.0. Instructed Mr. Houser continue maintenance dose of warfarin 2.5mg oral daily except 5mg on MonWedSat.  2. Repeat INR in 4 weeks 1/25/22  3. Verbal information provided over the phone. Kurt Houser RBV dosing instructions, expresses understanding by teach back, and has no further questions at this time.    Regine Adame, PharmD.  12/28/21   13:39 EST

## 2022-01-25 ENCOUNTER — CLINICAL SUPPORT (OUTPATIENT)
Dept: CARDIOLOGY | Facility: CLINIC | Age: 82
End: 2022-01-25

## 2022-02-25 ENCOUNTER — TELEPHONE (OUTPATIENT)
Dept: CARDIOLOGY | Facility: CLINIC | Age: 82
End: 2022-02-25

## 2022-02-25 NOTE — TELEPHONE ENCOUNTER
Pt is going to come in on Tuesday . WE were short that week and neither one of us can remember what it was . Pt was not upset I did apologize for it .

## 2022-02-25 NOTE — TELEPHONE ENCOUNTER
PT CALLED AND STATED THAT HE NEVER RECEIVED A CALL FROM THE [facility] AFTER RECEIVING HIS PREVIOUS INR. PLEASE ADVISE.

## 2022-03-01 ENCOUNTER — TELEPHONE (OUTPATIENT)
Dept: PHARMACY | Facility: HOSPITAL | Age: 82
End: 2022-03-01

## 2022-03-01 NOTE — TELEPHONE ENCOUNTER
Spoke with patient re: INR reminder    Patient state he went to have INR checked on 1/25/22 and never heard from [facility]    Appears Dr Javier's office was short handed and were unable to enter results into Epic. See telephone encounter from 2/25/22.    Mr. Houser reports he plans on going tomorrow (3/2)

## 2022-03-02 ENCOUNTER — ANTICOAGULATION VISIT (OUTPATIENT)
Dept: PHARMACY | Facility: HOSPITAL | Age: 82
End: 2022-03-02

## 2022-03-02 ENCOUNTER — CLINICAL SUPPORT (OUTPATIENT)
Dept: CARDIOLOGY | Facility: CLINIC | Age: 82
End: 2022-03-02

## 2022-03-02 DIAGNOSIS — I48.0 PAF (PAROXYSMAL ATRIAL FIBRILLATION): Primary | ICD-10-CM

## 2022-03-02 LAB — INR PPP: 2.9 (ref 0.9–1.1)

## 2022-03-02 NOTE — PROGRESS NOTES
Anticoagulation Clinic - Remote Progress Note  Remote Lab    Indication: paroxysmal afib  Referring Provider: Buddy Javier  Initial Warfarin Start Date:   Goal INR: 2.0-3.0  Current Drug Interactions: omega three fatty acid; CoQ-10   [id number]: 3 (HTN, Age)    Diet: ~ Tablespoon QOD mixed cooked greens; green beans 2x weekly 12/28/2021  Alcohol: None  Tobacco: None  OTC Pain Medication: APAP PRN    INR History:  Date 3/23 3/30 4/8 4/16 4/23 5/7 6/4 6/18 7/2 7/19 7/28 8/11   Total Weekly Dose 27.5mg 25mg 25mg 25mg 25mg 25mg 25mg 25mg 25mg 25mg 25mg 25mg   INR 3.2 2.7 3.2 2.0 2.4 2.7 1.8 1.8 2.3 1.9 1.9 2.6   Notes   LD doxy 4/8    incr GLV  decr GLV incr GLV  incr GLV decr GLV     Date 8/25 9/22 10/20 11/18 11/29 12/28  3/2/22       Total WeeklyDose 25mg 25mg 25mg 25mg 25mg 25mg non- compliant 25mg       INR 2.1 2.8 2.8 3.1 2.4 2.0  2.9       Notes    decr GLV    APAP         Phone Interview:  Tablet Strength: 5mg  Patient Contact Info: [phone] (Mobile)  Estimated OOP cost: will send if patient comes to [place]   Verbal Release Auth:   Lab Contact Info: [facility] POCT    Patient Findings  Positives:  Change in medications   Negatives:  Signs/symptoms of thrombosis, Signs/symptoms of bleeding, Laboratory test error suspected, Change in health, Change in alcohol use, Change in activity, Upcoming invasive procedure, Emergency department visit, Upcoming dental procedure, Missed doses, Extra doses, Change in diet/appetite, Hospital admission, Bruising, Other complaints   Comments:  Patient reports recently taking increased dose of APAP for foot pain. Sometimes takes 500mg (extra strength) and sometimes takes 650mg (arthritis). Does not usually take APAP regularly. Does feel he has been consistent with GLV intake. Otherwise denies findings.      Plan:  1. INR is therapeutic today at 2.9 (goal 2.0-3.0). Patient was non-compliant with requested follow-up. Instructed Mr. Houser have a small serving of  GLV tonight and continue maintenance dose of warfarin 2.5mg oral daily except 5mg on MonWedSat until recheck (25mg/week)  2. Repeat INR in 2 weeks, 3/16, to ensure WNL  3. Verbal information provided over the phone. Kurt Houser RBV dosing instructions, expresses understanding by teach back, and has no further questions at this time.    Micky Barnett, TRES  3/2/2022  10:34 EST    I, Amisha Santana, PharmD, have reviewed the note in full and agree with the assessment and plan.  03/02/22  14:36 EST

## 2022-03-18 ENCOUNTER — ANTICOAGULATION VISIT (OUTPATIENT)
Dept: PHARMACY | Facility: HOSPITAL | Age: 82
End: 2022-03-18

## 2022-03-18 ENCOUNTER — CLINICAL SUPPORT (OUTPATIENT)
Dept: CARDIOLOGY | Facility: CLINIC | Age: 82
End: 2022-03-18

## 2022-03-18 DIAGNOSIS — I48.0 PAF (PAROXYSMAL ATRIAL FIBRILLATION): Primary | ICD-10-CM

## 2022-03-18 LAB — INR PPP: 3.2 (ref 0.9–1.1)

## 2022-03-22 NOTE — PROGRESS NOTES
Anticoagulation Clinic - Remote Progress Note  Remote Lab    Indication: paroxysmal afib  Referring Provider: Buddy Javier  Initial Warfarin Start Date:   Goal INR: 2.0-3.0  Current Drug Interactions: omega three fatty acid; CoQ-10   [id number]: 3 (HTN, Age)    Diet: ~ Tablespoon QOD mixed cooked greens; green beans 2x weekly 3/22/22  Alcohol: None  Tobacco: None  OTC Pain Medication: APAP PRN    INR History:  Date 3/23 3/30 4/8 4/16 4/23 5/7 6/4 6/18 7/2 7/19 7/28 8/11   Total Weekly Dose 27.5mg 25mg 25mg 25mg 25mg 25mg 25mg 25mg 25mg 25mg 25mg 25mg   INR 3.2 2.7 3.2 2.0 2.4 2.7 1.8 1.8 2.3 1.9 1.9 2.6   Notes   LD doxy 4/8    incr GLV  decr GLV incr GLV  incr GLV decr GLV     Date 8/25 9/22 10/20 11/18 11/29 12/28  3/2/22 3/1/      Total WeeklyDose 25mg 25mg 25mg 25mg 25mg 25mg non- compliant 25mg 25 mg      INR 2.1 2.8 2.8 3.1 2.4 2.0  2.9 3.2      Notes    decr GLV    APAP  Inc GLV       Phone Interview:  Tablet Strength: 5mg  Patient Contact Info: [phone] (Mobile)  Estimated OOP cost: will send if patient comes to [place]   Verbal Release Auth:   Lab Contact Info: [facility] POCT    Patient Findings:  Negatives:  Signs/symptoms of thrombosis, Signs/symptoms of bleeding, Laboratory test error suspected, Change in health, Change in alcohol use, Change in activity, Upcoming invasive procedure, Emergency department visit, Upcoming dental procedure, Missed doses, Extra doses, Change in medications, Change in diet/appetite, Hospital admission, Bruising, Other complaints   Comments:  Mr. Houser denies any changes since last encounter. He has slightly increased his GLV intake since seeing his INR was supra therapeutic on Friday. Advised him this would be okay but to not drastically increase his vitamin K as this could send his INR too low. He verbalized understanding.      Plan:  1. INR was supra therapeutic on 3/18 at 3.2. Patient was non-compliant with requested follow-up. Spoke with Regine  Wendi, PharmD, and instructed Mr. Houser to continue maintenance dose of warfarin 2.5 mg oral daily except 5 mg on MonWedSat until recheck (25 mg/week). He will slightly increase his GLV intake per his preference.  2. Repeat INR on Monday, 4/4, per patient preference. Unable to check sooner.   3. Verbal information provided over the phone. Kurt Houser RBV dosing instructions, expresses understanding by teach back, and has no further questions at this time.    Kari Ortega, Evgeny  3/22/2022  10:01 EDT     I, Regine Adame, PharmD, have reviewed the note in full and agree with the assessment and plan.  03/22/22  12:10 EDT

## 2022-03-23 ENCOUNTER — TELEPHONE (OUTPATIENT)
Dept: FAMILY MEDICINE CLINIC | Facility: CLINIC | Age: 82
End: 2022-03-23

## 2022-03-23 RX ORDER — LISINOPRIL 40 MG/1
TABLET ORAL
Qty: 90 TABLET | Refills: 0 | Status: SHIPPED | OUTPATIENT
Start: 2022-03-23 | End: 2022-03-28 | Stop reason: SDUPTHER

## 2022-03-23 NOTE — TELEPHONE ENCOUNTER
Rx Refill Note  Requested Prescriptions     Pending Prescriptions Disp Refills   • lisinopril (PRINIVIL,ZESTRIL) 40 MG tablet [Pharmacy Med Name: Lisinopril 40 MG Oral Tablet] 90 tablet 0     Sig: Take 1 tablet by mouth once daily      Last office visit with prescribing clinician: 03/17/22 via [employer]  Next office visit with prescribing clinician: 6/17/2022       {TIP  Please add Last Relevant Lab Date if appropriate 11/08/21 via [employer]  {TIP  Is Refill Pharmacy correct? yes  Alfonso Atkinson  03/23/22, 14:19 EDT    Last sent 09/28/21 via [employer]

## 2022-03-28 RX ORDER — LISINOPRIL 40 MG/1
1 TABLET ORAL DAILY
COMMUNITY
Start: 2021-09-28 | End: 2022-03-28

## 2022-03-28 RX ORDER — WARFARIN SODIUM 5 MG/1
1 TABLET ORAL DAILY
COMMUNITY
Start: 2021-12-15 | End: 2022-04-06

## 2022-03-28 RX ORDER — GABAPENTIN 600 MG/1
2 TABLET ORAL 3 TIMES DAILY
COMMUNITY
Start: 2022-03-17 | End: 2022-08-16

## 2022-03-28 RX ORDER — WARFARIN SODIUM 5 MG/1
5 TABLET ORAL DAILY
Qty: 30 TABLET | Refills: 2 | Status: SHIPPED | OUTPATIENT
Start: 2022-03-28 | End: 2022-03-28 | Stop reason: SDUPTHER

## 2022-03-28 RX ORDER — LEVOTHYROXINE SODIUM 0.03 MG/1
1 TABLET ORAL DAILY
COMMUNITY
Start: 2022-02-04 | End: 2023-01-19

## 2022-03-28 RX ORDER — CEPHALEXIN 500 MG/1
1 CAPSULE ORAL EVERY 6 HOURS
COMMUNITY
Start: 2022-03-07 | End: 2022-09-21

## 2022-03-28 RX ORDER — MELOXICAM 15 MG/1
1 TABLET ORAL DAILY
COMMUNITY
Start: 2021-10-12 | End: 2022-04-06

## 2022-03-28 RX ORDER — LISINOPRIL 40 MG/1
40 TABLET ORAL DAILY
Qty: 90 TABLET | Refills: 1 | Status: SHIPPED | OUTPATIENT
Start: 2022-03-28 | End: 2022-12-21

## 2022-03-28 RX ORDER — AMLODIPINE BESYLATE 5 MG/1
1 TABLET ORAL DAILY
COMMUNITY
Start: 2021-12-15 | End: 2022-09-15

## 2022-03-28 NOTE — TELEPHONE ENCOUNTER
Patient contacted Dr. Javier's office requesting a refill of warfarin.    Please send to [facility] in [place], [place].    Thank you.

## 2022-04-04 ENCOUNTER — ANTICOAGULATION VISIT (OUTPATIENT)
Dept: PHARMACY | Facility: HOSPITAL | Age: 82
End: 2022-04-04

## 2022-04-04 ENCOUNTER — CLINICAL SUPPORT (OUTPATIENT)
Dept: CARDIOLOGY | Facility: CLINIC | Age: 82
End: 2022-04-04

## 2022-04-04 DIAGNOSIS — I48.0 PAF (PAROXYSMAL ATRIAL FIBRILLATION): Primary | ICD-10-CM

## 2022-04-04 LAB — INR PPP: 3.5 (ref 0.9–1.1)

## 2022-04-04 NOTE — PROGRESS NOTES
Anticoagulation Clinic - Remote Progress Note  Remote Lab    Indication: paroxysmal afib  Referring Provider: Buddy Javier  Initial Warfarin Start Date:   Goal INR: 2.0-3.0  Current Drug Interactions: omega three fatty acid; CoQ-10   [id number]: 3 (HTN, Age)    Diet: ~ Tablespoon QOD mixed cooked greens; green beans 2x weekly 3/22/22  Alcohol: None  Tobacco: None  OTC Pain Medication: APAP PRN    INR History:  Date 3/23 3/30 4/8 4/16 4/23 5/7 6/4 6/18 7/2 7/19 7/28 8/11   Total Weekly Dose 27.5mg 25mg 25mg 25mg 25mg 25mg 25mg 25mg 25mg 25mg 25mg 25mg   INR 3.2 2.7 3.2 2.0 2.4 2.7 1.8 1.8 2.3 1.9 1.9 2.6   Notes   LD doxy 4/8    incr GLV  decr GLV incr GLV  incr GLV decr GLV     Date 8/25 9/22 10/20 11/18 11/29 12/28  3/2/22 3/1/ 4/5/22     Total WeeklyDose 25mg 25mg 25mg 25mg 25mg 25mg non- compliant 25mg 25 mg 25 mg     INR 2.1 2.8 2.8 3.1 2.4 2.0  2.9 3.2 3.5     Notes    decr GLV    APAP  decr GLV       Phone Interview:  Tablet Strength: 5mg  Patient Contact Info: [phone] (Mobile)  Estimated OOP cost: will send if patient comes to [place]   Verbal Release Auth:   Lab Contact Info: [facility] POCT  Patient Findings    Positives:  Change in diet/appetite   Negatives:  Signs/symptoms of thrombosis, Signs/symptoms of bleeding, Laboratory test error suspected, Change in health, Change in alcohol use, Change in activity, Upcoming invasive procedure, Emergency department visit, Upcoming dental procedure, Missed doses, Extra doses, Change in medications, Hospital admission, Bruising, Other complaints   Comments:  He has a reduced intake of LGV. He has only been eating salads and hasn't had his tablespoon of mixed greens q48h as usual.       Plan:  1. INR was supra therapeutic on 4/4 at 3.5.  Patient was non-compliant with requested follow-up. Instructed Mr. Houser to continue maintenance dose of warfarin 2.5 mg oral daily except 5 mg on MonWedSat until recheck (25 mg/week). He will eat two  tablespoonfuls of mixed greens tonight and resume his usually intake of LGV.  2. Repeat INR on Monday, 4/11. If he remains supratherapeutic then would consider reducing his dose. We agreed to this plan for now without a dose change; if he held a dose today it is a 10% reduction. He may need smaller tablet size in the future for finer dose adjustments.   3. Verbal information provided over the phone. Kurt Houser RBV dosing instructions, expresses understanding by teach back, and has no further questions at this time.    Micky Yeboah, PharmD  4/5/2022  10:25 EDT

## 2022-04-06 RX ORDER — MELOXICAM 15 MG/1
TABLET ORAL
Qty: 90 TABLET | Refills: 0 | Status: SHIPPED | OUTPATIENT
Start: 2022-04-06 | End: 2022-08-16

## 2022-04-06 NOTE — TELEPHONE ENCOUNTER
Rx Refill Note    Requested Prescriptions     Pending Prescriptions Disp Refills   • meloxicam (MOBIC) 15 MG tablet [Pharmacy Med Name: Meloxicam 15 MG Oral Tablet] 90 tablet 0     Sig: Take 1 tablet by mouth once daily        Last office visit with prescribing clinician: Via casey 03/17/22      Next office visit with prescribing clinician: 6/17/2022   Last labs: 11/08/21  Last refill: 10/12/21   Pharmacy (be sure to add in Epic). correct

## 2022-04-11 ENCOUNTER — CLINICAL SUPPORT (OUTPATIENT)
Dept: CARDIOLOGY | Facility: CLINIC | Age: 82
End: 2022-04-11

## 2022-04-11 ENCOUNTER — ANTICOAGULATION VISIT (OUTPATIENT)
Dept: PHARMACY | Facility: HOSPITAL | Age: 82
End: 2022-04-11

## 2022-04-11 DIAGNOSIS — I48.0 PAF (PAROXYSMAL ATRIAL FIBRILLATION): Primary | ICD-10-CM

## 2022-04-11 LAB — INR PPP: 2.5 (ref 0.9–1.1)

## 2022-04-11 NOTE — PROGRESS NOTES
Anticoagulation Clinic - Remote Progress Note  Remote Lab    Indication: paroxysmal afib  Referring Provider: Buddy Javier  Initial Warfarin Start Date:   Goal INR: 2.0-3.0  Current Drug Interactions: omega three fatty acid; CoQ-10   [id number]: 3 (HTN, Age)    Diet: ~ Tablespoon QOD mixed cooked greens; green beans 2x weekly 3/22/22  Alcohol: None  Tobacco: None  OTC Pain Medication: APAP PRN    INR History:  Date 3/23 3/30 4/8 4/16 4/23 5/7 6/4 6/18 7/2 7/19 7/28 8/11   Total Weekly Dose 27.5mg 25mg 25mg 25mg 25mg 25mg 25mg 25mg 25mg 25mg 25mg 25mg   INR 3.2 2.7 3.2 2.0 2.4 2.7 1.8 1.8 2.3 1.9 1.9 2.6   Notes   LD doxy 4/8    incr GLV  decr GLV incr GLV  incr GLV decr GLV     Date 8/25 9/22 10/20 11/18 11/29 12/28  3/2/22 3/1/ 4/5/22 4/11    Total WeeklyDose 25mg 25mg 25mg 25mg 25mg 25mg non- compliant 25mg 25 mg 25 mg 25mg    INR 2.1 2.8 2.8 3.1 2.4 2.0  2.9 3.2 3.5 2.5    Notes    decr GLV    APAP  decr GLV       Phone Interview:  Tablet Strength: 5mg  Patient Contact Info: [phone] (Mobile)  Estimated OOP cost: will send if patient comes to [place]   Verbal Release Auth:   Lab Contact Info: [facility] POCT  Patient Findings    Negatives:  Signs/symptoms of thrombosis, Signs/symptoms of bleeding, Laboratory test error suspected, Change in health, Change in alcohol use, Change in activity, Upcoming invasive procedure, Emergency department visit, Upcoming dental procedure, Missed doses, Extra doses, Change in medications, Change in diet/appetite, Hospital admission, Bruising, Other complaints   Comments:  Resumed normal GLV-- QOD       Plan:  1. INR was therapeutic at 2.5.  Instructed Mr. Houser to continue maintenance dose of warfarin 2.5 mg oral daily except 5 mg on MonWedSat until recheck (25 mg/week).   2. Repeat INR 4/25  3. Verbal information provided over the phone. Kurt Houser RBV dosing instructions, expresses understanding by teach back, and has no further questions at this  time.    Regine Adame, BeanD.  04/12/22   12:27 EDT

## 2022-04-26 ENCOUNTER — ANTICOAGULATION VISIT (OUTPATIENT)
Dept: PHARMACY | Facility: HOSPITAL | Age: 82
End: 2022-04-26

## 2022-04-26 ENCOUNTER — CLINICAL SUPPORT (OUTPATIENT)
Dept: CARDIOLOGY | Facility: CLINIC | Age: 82
End: 2022-04-26

## 2022-04-26 DIAGNOSIS — I48.0 PAF (PAROXYSMAL ATRIAL FIBRILLATION): Primary | ICD-10-CM

## 2022-04-26 LAB — INR PPP: 2.8 (ref 0.9–1.1)

## 2022-04-26 NOTE — PROGRESS NOTES
Anticoagulation Clinic - Remote Progress Note  Remote Lab    Indication: paroxysmal afib  Referring Provider: Buddy Javier  Initial Warfarin Start Date:   Goal INR: 2.0-3.0  Current Drug Interactions: omega three fatty acid; CoQ-10   [id number]: 3 (HTN, Age)    Diet: ~ 2 Tablespoon QOD mixed cooked greens; green beans 2x weekly 3/22/22  Alcohol: None  Tobacco: None  OTC Pain Medication: APAP PRN    INR History:  Date 3/23 3/30 4/8 4/16 4/23 5/7 6/4 6/18 7/2 7/19 7/28 8/11   Total Weekly Dose 27.5mg 25mg 25mg 25mg 25mg 25mg 25mg 25mg 25mg 25mg 25mg 25mg   INR 3.2 2.7 3.2 2.0 2.4 2.7 1.8 1.8 2.3 1.9 1.9 2.6   Notes   LD doxy 4/8    incr GLV  decr GLV incr GLV  incr GLV decr GLV     Date 8/25 9/22 10/20 11/18 11/29 12/28  3/2/22 3/1/ 4/5/22 4/11 4/27   Total WeeklyDose 25mg 25mg 25mg 25mg 25mg 25mg non- compliant 25mg 25 mg 25 mg 25mg 25 mg   INR 2.1 2.8 2.8 3.1 2.4 2.0  2.9 3.2 3.5 2.5 2.8   Notes    decr GLV    APAP  decr GLV       Phone Interview:  Tablet Strength: 5mg  Patient Contact Info: [phone] (Mobile)  Estimated OOP cost: will send if patient comes to [place]   Verbal Release Auth:   Lab Contact Info: [facility] POCT    Patient Findings    Positives:  Other complaints   Negatives:  Signs/symptoms of thrombosis, Signs/symptoms of bleeding, Laboratory test error suspected, Change in health, Change in alcohol use, Change in activity, Upcoming invasive procedure, Emergency department visit, Upcoming dental procedure, Missed doses, Extra doses, Change in medications, Change in diet/appetite, Hospital admission, Bruising   Comments:  He increased his Q48h greens to 2 tablespoons. I asked him to keep us informed. He did have some broccoli yesterday.       Plan:  1. INR was therapeutic at 2.8 from yesterday.  Instructed Mr. Houser to continue maintenance dose of warfarin 2.5 mg oral daily except 5 mg on MonWedSat until recheck (25 mg/week). Be consistent with LGV intake.  2. Repeat INR 4 weeks.  5/24.  3. Verbal information provided over the phone. Kurt Houser RBV dosing instructions, expresses understanding by teach back, and has no further questions at this time.    Micky Yeboah, PharmD  04/26/22   14:20 EDT

## 2022-05-09 ENCOUNTER — LAB (OUTPATIENT)
Dept: FAMILY MEDICINE CLINIC | Facility: CLINIC | Age: 82
End: 2022-05-09

## 2022-05-09 DIAGNOSIS — R73.9 HYPERGLYCEMIA: Primary | ICD-10-CM

## 2022-05-09 DIAGNOSIS — E03.9 ACQUIRED HYPOTHYROIDISM: ICD-10-CM

## 2022-05-09 DIAGNOSIS — E55.9 VITAMIN D DEFICIENCY: ICD-10-CM

## 2022-05-09 DIAGNOSIS — E78.5 HYPERLIPIDEMIA, UNSPECIFIED HYPERLIPIDEMIA TYPE: ICD-10-CM

## 2022-05-09 DIAGNOSIS — E53.8 VITAMIN B 12 DEFICIENCY: ICD-10-CM

## 2022-05-09 PROCEDURE — 36415 COLL VENOUS BLD VENIPUNCTURE: CPT | Performed by: FAMILY MEDICINE

## 2022-05-10 LAB
25(OH)D3+25(OH)D2 SERPL-MCNC: 28 NG/ML (ref 30–100)
ALBUMIN SERPL-MCNC: 3.9 G/DL (ref 3.6–4.6)
ALBUMIN/GLOB SERPL: 1.1 {RATIO} (ref 1.2–2.2)
ALP SERPL-CCNC: 68 IU/L (ref 44–121)
ALT SERPL-CCNC: 7 IU/L (ref 0–44)
AST SERPL-CCNC: 14 IU/L (ref 0–40)
BASOPHILS # BLD AUTO: 0 X10E3/UL (ref 0–0.2)
BASOPHILS NFR BLD AUTO: 0 %
BILIRUB SERPL-MCNC: 0.3 MG/DL (ref 0–1.2)
BUN SERPL-MCNC: 24 MG/DL (ref 8–27)
BUN/CREAT SERPL: 20 (ref 10–24)
CALCIUM SERPL-MCNC: 9.1 MG/DL (ref 8.6–10.2)
CHLORIDE SERPL-SCNC: 102 MMOL/L (ref 96–106)
CHOLEST SERPL-MCNC: 176 MG/DL (ref 100–199)
CK SERPL-CCNC: 66 U/L (ref 30–208)
CO2 SERPL-SCNC: 24 MMOL/L (ref 20–29)
CREAT SERPL-MCNC: 1.21 MG/DL (ref 0.76–1.27)
EGFRCR SERPLBLD CKD-EPI 2021: 60 ML/MIN/1.73
EOSINOPHIL # BLD AUTO: 0.2 X10E3/UL (ref 0–0.4)
EOSINOPHIL NFR BLD AUTO: 3 %
ERYTHROCYTE [DISTWIDTH] IN BLOOD BY AUTOMATED COUNT: 13.1 % (ref 11.6–15.4)
GLOBULIN SER CALC-MCNC: 3.5 G/DL (ref 1.5–4.5)
GLUCOSE SERPL-MCNC: 104 MG/DL (ref 65–99)
HBA1C MFR BLD: 6.3 % (ref 4.8–5.6)
HCT VFR BLD AUTO: 37.3 % (ref 37.5–51)
HDLC SERPL-MCNC: 30 MG/DL
HGB BLD-MCNC: 12.2 G/DL (ref 13–17.7)
IMM GRANULOCYTES # BLD AUTO: 0 X10E3/UL (ref 0–0.1)
IMM GRANULOCYTES NFR BLD AUTO: 0 %
LDLC SERPL CALC-MCNC: 103 MG/DL (ref 0–99)
LYMPHOCYTES # BLD AUTO: 1.5 X10E3/UL (ref 0.7–3.1)
LYMPHOCYTES NFR BLD AUTO: 26 %
MCH RBC QN AUTO: 30.7 PG (ref 26.6–33)
MCHC RBC AUTO-ENTMCNC: 32.7 G/DL (ref 31.5–35.7)
MCV RBC AUTO: 94 FL (ref 79–97)
MONOCYTES # BLD AUTO: 0.7 X10E3/UL (ref 0.1–0.9)
MONOCYTES NFR BLD AUTO: 13 %
NEUTROPHILS # BLD AUTO: 3.3 X10E3/UL (ref 1.4–7)
NEUTROPHILS NFR BLD AUTO: 58 %
PLATELET # BLD AUTO: 192 X10E3/UL (ref 150–450)
POTASSIUM SERPL-SCNC: 4.7 MMOL/L (ref 3.5–5.2)
PROT SERPL-MCNC: 7.4 G/DL (ref 6–8.5)
RBC # BLD AUTO: 3.98 X10E6/UL (ref 4.14–5.8)
SODIUM SERPL-SCNC: 140 MMOL/L (ref 134–144)
TRIGL SERPL-MCNC: 252 MG/DL (ref 0–149)
TSH SERPL DL<=0.005 MIU/L-ACNC: 3.78 UIU/ML (ref 0.45–4.5)
VIT B12 SERPL-MCNC: 456 PG/ML (ref 232–1245)
VLDLC SERPL CALC-MCNC: 43 MG/DL (ref 5–40)
WBC # BLD AUTO: 5.7 X10E3/UL (ref 3.4–10.8)

## 2022-05-17 DIAGNOSIS — E78.2 MIXED HYPERLIPIDEMIA: Primary | ICD-10-CM

## 2022-05-18 RX ORDER — PRAVASTATIN SODIUM 40 MG
TABLET ORAL
Qty: 90 TABLET | Refills: 0 | Status: SHIPPED | OUTPATIENT
Start: 2022-05-18 | End: 2022-05-31 | Stop reason: DRUGHIGH

## 2022-05-31 ENCOUNTER — ANTICOAGULATION VISIT (OUTPATIENT)
Dept: PHARMACY | Facility: HOSPITAL | Age: 82
End: 2022-05-31

## 2022-05-31 ENCOUNTER — OFFICE VISIT (OUTPATIENT)
Dept: CARDIOLOGY | Facility: CLINIC | Age: 82
End: 2022-05-31

## 2022-05-31 VITALS
HEIGHT: 74 IN | TEMPERATURE: 98 F | HEART RATE: 67 BPM | OXYGEN SATURATION: 99 % | WEIGHT: 231 LBS | SYSTOLIC BLOOD PRESSURE: 130 MMHG | BODY MASS INDEX: 29.65 KG/M2 | RESPIRATION RATE: 18 BRPM | DIASTOLIC BLOOD PRESSURE: 74 MMHG

## 2022-05-31 DIAGNOSIS — I10 ESSENTIAL HYPERTENSION: ICD-10-CM

## 2022-05-31 DIAGNOSIS — R73.03 BORDERLINE DIABETES: ICD-10-CM

## 2022-05-31 DIAGNOSIS — I48.0 PAF (PAROXYSMAL ATRIAL FIBRILLATION): Primary | ICD-10-CM

## 2022-05-31 DIAGNOSIS — E78.5 HYPERLIPIDEMIA LDL GOAL <100: ICD-10-CM

## 2022-05-31 LAB — INR PPP: 2.9 (ref 0.9–1.1)

## 2022-05-31 PROCEDURE — 99214 OFFICE O/P EST MOD 30 MIN: CPT | Performed by: INTERNAL MEDICINE

## 2022-05-31 RX ORDER — ROSUVASTATIN CALCIUM 20 MG/1
20 TABLET, COATED ORAL DAILY
Qty: 90 TABLET | Refills: 3 | Status: SHIPPED | OUTPATIENT
Start: 2022-05-31 | End: 2023-03-16 | Stop reason: SDUPTHER

## 2022-05-31 NOTE — PROGRESS NOTES
[facility]  [facility]  [address] [place] [address]  Dept: [phone]  Dept Fax: [phone]    Kurt Houser  1940    Follow Up Office Visit Note    History of Present Illness:  Kurt Houser is a 82 y.o. male who presents to the clinic for Follow-up. PAF- He denies any complaints, on no major meds except Warfarin, his Hr was 68 on beta blockers, low dose his Hr went slow, will keep same meds    The following portions of the patient's history were reviewed and updated as appropriate: allergies, current medications, past family history, past medical history, past social history, past surgical history and problem list.    Medications:  amLODIPine  B-12 capsule  cephalexin  cholecalciferol  CoQ-10 capsule  fish oil capsule  gabapentin  levothyroxine  lisinopril  meloxicam  rosuvastatin    Subjective  Allergies   Allergen Reactions   • Acetaminophen Mental Status Change   • Hydrocodone Bitart (Antituss) [Hydrocodone] Mental Status Change        Past Medical History:   Diagnosis Date   • Acute osteomyelitis of foot (HCC)    • Amputated toe of left foot (HCC)    • Anemia of decreased vitamin B12 absorption    • BMI 27.0-27.9,adult    • Degenerative joint disease involving multiple joints    • Diverticular disease of colon    • Drug therapy    • Dyslipidemia    • Essential hypertension    • Foot osteomyelitis (HCC)    • Frequent PVCs    • Glaucoma    • High risk medication use    • History of adenomatous polyp of colon    • Long term current use of anticoagulant    • Low density lipoprotein (LDL) cholesterol level below 100mg/dL    • Mixed hyperlipidemia    • Neuropathy    • Osteoarthritis    • Paroxysmal atrial fibrillation (HCC)    • Polyneuropathy    • Prediabetes    • Ventricular premature beats    • Vitamin B12 deficiency    • Vitamin D deficiency        Past Surgical History:   Procedure Laterality Date   • CATARACT EXTRACTION, BILATERAL  2010   • FOOT  "SURGERY Left    • TRANS METATARSAL AMPUTATION  2020    5th metatarsal amputation/IV abx   • WRIST SURGERY Left        Family History   Problem Relation Age of Onset   • Colon cancer Father    • Liver cancer Brother         Social History     Socioeconomic History   • Marital status:    Tobacco Use   • Smoking status: Former Smoker     Packs/day: 1.00     Years: 27.00     Pack years: 27.00     Types: Cigarettes, Cigars     Start date:      Quit date:      Years since quittin.4   • Smokeless tobacco: Never Used   • Tobacco comment: 1 cigar/day, 2.40 years   Vaping Use   • Vaping Use: Never used   Substance and Sexual Activity   • Alcohol use: Not Currently     Comment: about every 1-2 years he mighst have a drink   • Drug use: Never   • Sexual activity: Defer       Review of Systems   Constitutional: Negative.    HENT: Negative.    Respiratory: Negative.    Cardiovascular: Negative.    Endocrine: Negative.    Genitourinary: Negative.    Musculoskeletal: Negative.    Skin: Negative.    Allergic/Immunologic: Negative.    Neurological: Negative.    Hematological: Negative.    Psychiatric/Behavioral: Negative.        Cardiovascular Procedures    ECHO/MUGA:   STRESS TESTS:   CARDIAC CATH:   DEVICES:   HOLTER:   CT/MRI:   VASCULAR:   CARDIOTHORACIC:     Objective  Vitals:    22 1439   BP: 130/74   BP Location: Left arm   Patient Position: Sitting   Cuff Size: Adult   Pulse: 67   Resp: 18   Temp: 98 °F (36.7 °C)   TempSrc: Infrared   SpO2: 99%   Weight: 105 kg (231 lb)   Height: 188 cm (74\")   PainSc: 10-Worst pain ever   PainLoc: Knee     Body mass index is 29.66 kg/m².     Physical Exam  Constitutional:       Appearance: Healthy appearance. Not in distress.   Neck:      Vascular: No JVR. JVD normal.   Pulmonary:      Effort: Pulmonary effort is normal.      Breath sounds: Normal breath sounds. No wheezing. No rhonchi. No rales.   Chest:      Chest wall: Not tender to palpatation. "   Cardiovascular:      PMI at left midclavicular line. Normal rate. Regular rhythm. Normal S1. Normal S2.      Murmurs: There is no murmur.      No gallop. No click. No rub.   Pulses:     Intact distal pulses.   Edema:     Peripheral edema absent.   Abdominal:      General: Bowel sounds are normal.      Palpations: Abdomen is soft.      Tenderness: There is no abdominal tenderness.   Musculoskeletal: Normal range of motion.         General: No tenderness. Skin:     General: Skin is warm and dry.   Neurological:      General: No focal deficit present.      Mental Status: Alert and oriented to person, place and time.          Diagnostic Data  Procedures    Assessment and Plan  Diagnoses and all orders for this visit:    PAF (paroxysmal atrial fibrillation) (HCC)-Doing fine, no complaints, on Wearfarin  -     POC Protime / INR    Essential hypertension- The BP is 135.80,   On Lisinopril 40 mg and also Amlodipine 5mg    Hyperlipidemia LDL goal <100- On Pravastatin 40 mg, LDL is above 100, as he has prediabetes, will change to Crestor 20 mg, we need lower levels    Borderline diabetes-    Other orders  -     rosuvastatin (CRESTOR) 20 MG tablet; Take 1 tablet by mouth Daily.         Return in about 6 months (around 11/30/2022) for Recheck.    Buddy Javier MD  05/31/2022

## 2022-06-01 NOTE — PROGRESS NOTES
Anticoagulation Clinic - Remote Progress Note  Remote Lab    Indication: paroxysmal afib  Referring Provider: Buddy Javier  Initial Warfarin Start Date:   Goal INR: 2.0-3.0  Current Drug Interactions: omega three fatty acid; CoQ-10   [id number]: 3 (HTN, Age)    Diet: ~ 2 Tablespoon QOD mixed cooked greens; green beans 2x weekly 6/1/22  Alcohol: None  Tobacco: None  OTC Pain Medication: APAP PRN    INR History:  Date 3/23 3/30 4/8 4/16 4/23 5/7 6/4 6/18 7/2 7/19 7/28 8/11   Total Weekly Dose 27.5mg 25mg 25mg 25mg 25mg 25mg 25mg 25mg 25mg 25mg 25mg 25mg   INR 3.2 2.7 3.2 2.0 2.4 2.7 1.8 1.8 2.3 1.9 1.9 2.6   Notes   LD doxy 4/8    incr GLV  decr GLV incr GLV  incr GLV decr GLV     Date 8/25 9/22 10/20 11/18 11/29 12/28  3/2/22 3/1/ 4/5/22 4/11 4/27   Total WeeklyDose 25mg 25mg 25mg 25mg 25mg 25mg non- compliant 25mg 25 mg 25 mg 25mg 25 mg   INR 2.1 2.8 2.8 3.1 2.4 2.0  2.9 3.2 3.5 2.5 2.8   Notes    decr GLV    APAP  decr GLV       Date 5/31       Total Weekly Dose 25 mg       INR 2.9       Notes          Phone Interview:  Tablet Strength: 5mg  Patient Contact Info: [phone] (Mobile)  Estimated OOP cost: will send if patient comes to [place]   Verbal Release Auth:   Lab Contact Info: [facility]    Patient Findings:  Positives:  Change in medications   Negatives:  Signs/symptoms of thrombosis, Signs/symptoms of bleeding, Laboratory test error suspected, Change in health, Change in alcohol use, Change in activity, Upcoming invasive procedure, Emergency department visit, Upcoming dental procedure, Missed doses, Extra doses, Change in diet/appetite, Hospital admission, Bruising, Other complaints   Comments:  Started 5000ui of vitamin D about ~1-2 weeks ago. States he is still trying to eat two tablespoons of cooked greens every other day. Denies any other findings.     Plan:  1. INR was therapeutic yesterday at 2.9. Instructed Mr. Houser to continue maintenance dose of warfarin 2.5 mg oral daily  except 5 mg on MonWedSat until recheck (25 mg/week).  2. Repeat INR 4 weeks, 6/28/22.  3. Verbal information provided over the phone. Kurt Houser RBV dosing instructions, expresses understanding by teach back, and has no further questions at this time.    Kari Ortega, [facility] [place]  6/1/2022  09:57 EDT    I, Harman Salas, PharmD, have reviewed the note in full and agree with the assessment and plan.  06/01/22  15:30 EDT

## 2022-06-21 ENCOUNTER — OFFICE VISIT (OUTPATIENT)
Dept: FAMILY MEDICINE CLINIC | Facility: CLINIC | Age: 82
End: 2022-06-21

## 2022-06-21 VITALS
WEIGHT: 232.4 LBS | OXYGEN SATURATION: 98 % | HEIGHT: 74 IN | BODY MASS INDEX: 29.82 KG/M2 | RESPIRATION RATE: 12 BRPM | HEART RATE: 75 BPM | SYSTOLIC BLOOD PRESSURE: 142 MMHG | DIASTOLIC BLOOD PRESSURE: 100 MMHG | TEMPERATURE: 98.4 F

## 2022-06-21 DIAGNOSIS — E78.2 HYPERLIPIDEMIA, MIXED: ICD-10-CM

## 2022-06-21 DIAGNOSIS — M54.50 LOW BACK PAIN, UNSPECIFIED BACK PAIN LATERALITY, UNSPECIFIED CHRONICITY, UNSPECIFIED WHETHER SCIATICA PRESENT: ICD-10-CM

## 2022-06-21 DIAGNOSIS — M15.9 PRIMARY OSTEOARTHRITIS INVOLVING MULTIPLE JOINTS: ICD-10-CM

## 2022-06-21 DIAGNOSIS — G62.9 NEUROPATHY: ICD-10-CM

## 2022-06-21 DIAGNOSIS — I10 HYPERTENSION, ESSENTIAL: Primary | ICD-10-CM

## 2022-06-21 DIAGNOSIS — E55.9 VITAMIN D DEFICIENCY: ICD-10-CM

## 2022-06-21 DIAGNOSIS — R73.9 HYPERGLYCEMIA: ICD-10-CM

## 2022-06-21 DIAGNOSIS — E03.9 ACQUIRED HYPOTHYROIDISM: ICD-10-CM

## 2022-06-21 DIAGNOSIS — E53.8 VITAMIN B12 DEFICIENCY: ICD-10-CM

## 2022-06-21 PROBLEM — K57.30 DIVERTICULAR DISEASE OF COLON: Status: ACTIVE | Noted: 2018-04-12

## 2022-06-21 PROBLEM — M86.179 ACUTE OSTEOMYELITIS OF FOOT (HCC): Status: ACTIVE | Noted: 2022-06-21

## 2022-06-21 PROBLEM — R79.89 LOW VITAMIN B12 LEVEL: Status: ACTIVE | Noted: 2018-04-12

## 2022-06-21 PROBLEM — Z86.0100 HISTORY OF COLONIC POLYPS: Status: ACTIVE | Noted: 2018-04-12

## 2022-06-21 PROBLEM — E78.6 LOW HDL (UNDER 40): Status: ACTIVE | Noted: 2018-04-12

## 2022-06-21 PROBLEM — Z79.01 LONG TERM (CURRENT) USE OF ANTICOAGULANTS: Status: ACTIVE | Noted: 2022-06-21

## 2022-06-21 PROBLEM — M15.0 PRIMARY OSTEOARTHRITIS INVOLVING MULTIPLE JOINTS: Status: ACTIVE | Noted: 2018-04-12

## 2022-06-21 PROBLEM — Z79.899 HIGH RISK MEDICATION USE: Status: ACTIVE | Noted: 2022-06-21

## 2022-06-21 PROBLEM — I49.3 VENTRICULAR PREMATURE BEATS: Status: ACTIVE | Noted: 2022-06-21

## 2022-06-21 PROBLEM — Z86.010 HISTORY OF COLONIC POLYPS: Status: ACTIVE | Noted: 2018-04-12

## 2022-06-21 PROCEDURE — 99214 OFFICE O/P EST MOD 30 MIN: CPT | Performed by: FAMILY MEDICINE

## 2022-06-21 NOTE — PROGRESS NOTES
Patient Name: Kurt Houser  : 1940   MRN: [id number]     Chief Complaint:    Chief Complaint   Patient presents with   • lab results     Pt here for lab results   • Hypertension   • Hyperlipidemia       History of Present Illness: Kurt Houser is a 82 y.o. male who is here today for follow up on BG and cholesterol  HPI        Review of Systems:   Review of Systems   Constitutional: Negative.    HENT: Negative.    Eyes: Negative.    Respiratory: Negative.    Cardiovascular: Negative.    Gastrointestinal: Negative.    Neurological: Negative.         Past Medical History:   Past Medical History:   Diagnosis Date   • Acute osteomyelitis of foot (HCC)    • Amputated toe of left foot (HCC)    • Anemia of decreased vitamin B12 absorption    • BMI 27.0-27.9,adult    • Degenerative joint disease involving multiple joints    • Diverticular disease of colon    • Drug therapy    • Dyslipidemia    • Essential hypertension    • Foot osteomyelitis (HCC)    • Frequent PVCs    • Glaucoma    • High risk medication use    • History of adenomatous polyp of colon    • Long term current use of anticoagulant    • Low density lipoprotein (LDL) cholesterol level below 100mg/dL    • Mixed hyperlipidemia    • Neuropathy    • Osteoarthritis    • Paroxysmal atrial fibrillation (HCC)    • Polyneuropathy    • Prediabetes    • Ventricular premature beats    • Vitamin B12 deficiency    • Vitamin D deficiency        Past Surgical History:   Past Surgical History:   Procedure Laterality Date   • CATARACT EXTRACTION, BILATERAL     • FOOT SURGERY Left    • TRANS METATARSAL AMPUTATION  2020    5th metatarsal amputation/IV abx   • WRIST SURGERY Left        Family History:   Family History   Problem Relation Age of Onset   • Colon cancer Father    • Liver cancer Brother        Social History:   Social History     Socioeconomic History   • Marital status:    Tobacco Use   • Smoking status: Former Smoker     Packs/day: 1.00      Years: 27.00     Pack years: 27.00     Types: Cigarettes, Cigars     Start date:      Quit date: 2000     Years since quittin.4   • Smokeless tobacco: Never Used   • Tobacco comment: 1 cigar/day, 2.40 years   Vaping Use   • Vaping Use: Never used   Substance and Sexual Activity   • Alcohol use: Not Currently     Comment: about every 1-2 years he mighst have a drink   • Drug use: Never   • Sexual activity: Defer       Medications:     Current Outpatient Medications:   •  amLODIPine (NORVASC) 5 MG tablet, Take 1 tablet by mouth Daily., Disp: , Rfl:   •  cholecalciferol (VITAMIN D3) 25 MCG (1000 UT) tablet, Take  by mouth. As directed, Disp: , Rfl:   •  Coenzyme Q10 (CoQ-10) 100 MG capsule, Take  by mouth Daily., Disp: , Rfl:   •  Cyanocobalamin (B-12) 1000 MCG capsule, Take  by mouth., Disp: , Rfl:   •  gabapentin (NEURONTIN) 600 MG tablet, Take 2 tablets by mouth 3 (Three) Times a Day., Disp: , Rfl:   •  levothyroxine (SYNTHROID, LEVOTHROID) 25 MCG tablet, Take 1 tablet by mouth Daily., Disp: , Rfl:   •  lisinopril (PRINIVIL,ZESTRIL) 40 MG tablet, Take 1 tablet by mouth Daily., Disp: 90 tablet, Rfl: 1  •  meloxicam (MOBIC) 15 MG tablet, Take 1 tablet by mouth once daily, Disp: 90 tablet, Rfl: 0  •  Omega-3 Fatty Acids (fish oil) 1000 MG capsule capsule, Take  by mouth Daily With Breakfast., Disp: , Rfl:   •  rosuvastatin (CRESTOR) 20 MG tablet, Take 1 tablet by mouth Daily., Disp: 90 tablet, Rfl: 3  •  cephalexin (KEFLEX) 500 MG capsule, Take 1 capsule by mouth Every 6 (Six) Hours., Disp: , Rfl:     Allergies:   Allergies   Allergen Reactions   • Acetaminophen Mental Status Change   • Hydrocodone Bitart (Antituss) [Hydrocodone] Mental Status Change         Physical Exam:  Vital Signs:   Vitals:    22 1341   BP: 142/100   BP Location: Left arm   Patient Position: Sitting   Cuff Size: Adult   Pulse: 75   Resp: 12   Temp: 98.4 °F (36.9 °C)   TempSrc: Axillary   SpO2: 98%   Weight: 105 kg (232 lb 6.4  "oz)   Height: 188 cm (74\")   PainSc:   6   PainLoc: Knee     Body mass index is 29.84 kg/m².     Physical Exam  Vitals and nursing note reviewed.   Constitutional:       Appearance: Normal appearance. He is normal weight.   HENT:      Head: Normocephalic and atraumatic.      Right Ear: Tympanic membrane, ear canal and external ear normal.      Left Ear: Tympanic membrane, ear canal and external ear normal.      Nose: Nose normal.      Mouth/Throat:      Mouth: Mucous membranes are dry.      Pharynx: Oropharynx is clear.   Eyes:      Extraocular Movements: Extraocular movements intact.      Conjunctiva/sclera: Conjunctivae normal.      Pupils: Pupils are equal, round, and reactive to light.   Cardiovascular:      Rate and Rhythm: Normal rate and regular rhythm.      Pulses: Normal pulses.      Heart sounds: Normal heart sounds.   Pulmonary:      Effort: Pulmonary effort is normal.      Breath sounds: Normal breath sounds.   Musculoskeletal:      Cervical back: Normal range of motion and neck supple.   Feet:      Comments:      Neurological:      Mental Status: He is alert.         Procedures      Assessment/Plan:   Diagnoses and all orders for this visit:    1. Hypertension, essential (Primary)  Assessment & Plan:  Pressure is high today.  Patient monitors at home closely and it usually runs pretty well.  He says it usually runs around 120 systolic.    Discussed with patient to monitor their blood pressure and if systolic blood pressure goes above 140 or diastolic is above 90 to return to clinic.  Take medicines as directed, call for any problems, patient not having or any worrisome symptoms.          2. Hyperglycemia  Assessment & Plan:  A1c is 6.3.  Recheck in 6 months.      3. Hyperlipidemia, mixed  Assessment & Plan:  HDL 30.  .  Triglycerides 252.  We will follow.      4. Primary osteoarthritis involving multiple joints  Assessment & Plan:  [facility] is [facility] and likes and getting " knee injections.  He walks with a cane.  Recheck in 3 months.  Continue current medication.  Does not need a refill at this time.      5. Vitamin D deficiency  Assessment & Plan:  Abdomen D 28.0.  Patient is on continue replacement.  Recheck in 6 months.      6. Acquired hypothyroidism  Assessment & Plan:  TSH is 3.780.  Recheck in 6 months.      7. Neuropathy  Assessment & Plan:  Stable.      8. Vitamin B12 deficiency  Assessment & Plan:  Vitamin B12 456.  Recheck in 6 months.      9. Low back pain, unspecified back pain laterality, unspecified chronicity, unspecified whether sciatica present           Follow Up:   Return in 3 months (on 9/21/2022) for Medicare Wellness.    Leo Cordova MD  [facility]

## 2022-06-21 NOTE — ASSESSMENT & PLAN NOTE
Pressure is high today.  Patient monitors at home closely and it usually runs pretty well.  He says it usually runs around 120 systolic.    Discussed with patient to monitor their blood pressure and if systolic blood pressure goes above 140 or diastolic is above 90 to return to clinic.  Take medicines as directed, call for any problems, patient not having or any worrisome symptoms.

## 2022-06-21 NOTE — ASSESSMENT & PLAN NOTE
Patient is going to the [facility] and likes and getting knee injections.  He walks with a cane.  Recheck in 3 months.  Continue current medication.  Does not need a refill at this time.

## 2022-06-23 RX ORDER — LISINOPRIL 40 MG/1
TABLET ORAL
Qty: 90 TABLET | Refills: 0 | OUTPATIENT
Start: 2022-06-23

## 2022-06-24 RX ORDER — LISINOPRIL 40 MG/1
TABLET ORAL
Qty: 90 TABLET | Refills: 0 | OUTPATIENT
Start: 2022-06-24

## 2022-06-28 ENCOUNTER — CLINICAL SUPPORT (OUTPATIENT)
Dept: CARDIOLOGY | Facility: CLINIC | Age: 82
End: 2022-06-28

## 2022-06-28 ENCOUNTER — ANTICOAGULATION VISIT (OUTPATIENT)
Dept: PHARMACY | Facility: HOSPITAL | Age: 82
End: 2022-06-28

## 2022-06-28 DIAGNOSIS — I48.0 PAF (PAROXYSMAL ATRIAL FIBRILLATION): Primary | ICD-10-CM

## 2022-06-28 LAB — INR PPP: 2.8 (ref 0.9–1.1)

## 2022-06-28 NOTE — PROGRESS NOTES
Anticoagulation Clinic - Remote Progress Note  Remote Lab    Indication: paroxysmal afib  Referring Provider: Buddy Javier  Initial Warfarin Start Date:   Goal INR: 2.0-3.0  Current Drug Interactions: omega three fatty acid; CoQ-10   [id number]: 3 (HTN, Age)    Diet: ~ 2 Tablespoon QOD mixed cooked greens; green beans 2x weekly 6/1/22  Alcohol: None  Tobacco: None  OTC Pain Medication: APAP PRN    INR History:  Date 3/23 3/30 4/8 4/16 4/23 5/7 6/4 6/18 7/2 7/19 7/28 8/11   Total Weekly Dose 27.5mg 25mg 25mg 25mg 25mg 25mg 25mg 25mg 25mg 25mg 25mg 25mg   INR 3.2 2.7 3.2 2.0 2.4 2.7 1.8 1.8 2.3 1.9 1.9 2.6   Notes   LD doxy 4/8    incr GLV  decr GLV incr GLV  incr GLV decr GLV     Date 8/25 9/22 10/20 11/18 11/29 12/28  3/2/22 3/1/ 4/5/22 4/11 4/27   Total WeeklyDose 25mg 25mg 25mg 25mg 25mg 25mg non- compliant 25mg 25 mg 25 mg 25mg 25 mg   INR 2.1 2.8 2.8 3.1 2.4 2.0  2.9 3.2 3.5 2.5 2.8   Notes    decr GLV    APAP  decr GLV       Date 5/31 6/28            Total Weekly Dose 25 mg 25 mg            INR 2.9 2.8            Notes                Phone Interview:  Tablet Strength: 5mg  Patient Contact Info: [phone] (Mobile)  Estimated OOP cost: will send if patient comes to [place]   Verbal Release Auth:   Lab Contact Info: [facility]    Patient Findings    Negatives:  Signs/symptoms of thrombosis, Signs/symptoms of bleeding, Laboratory test error suspected, Change in health, Change in alcohol use, Change in activity, Upcoming invasive procedure, Emergency department visit, Upcoming dental procedure, Missed doses, Extra doses, Change in medications, Change in diet/appetite, Hospital admission, Bruising, Other complaints   Comments:  Pt continues to take vitamin D and eats GLV every other day. All findings negative per pt.      Plan:  1. INR was therapeutic today at 2.8. Instructed Mr. Houser to continue maintenance dose of warfarin 2.5 mg oral daily except 5 mg on MonWedSat until recheck (25  mg/week).  2. Repeat INR 4 weeks, 7/26/22.  3. Verbal information provided over the phone. Kurt Houser RBV dosing instructions, expresses understanding by teach back, and has no further questions at this time.    Pete Dickinson CPhT  6/28/2022  14:18 EDT       I, Regine Adame, PharmD, have reviewed the note in full and agree with the assessment and plan.  06/28/22  14:31 EDT

## 2022-06-29 RX ORDER — LISINOPRIL 40 MG/1
TABLET ORAL
Qty: 90 TABLET | Refills: 0 | OUTPATIENT
Start: 2022-06-29

## 2022-07-22 RX ORDER — WARFARIN SODIUM 5 MG/1
TABLET ORAL
Qty: 90 TABLET | Refills: 0 | OUTPATIENT
Start: 2022-07-22

## 2022-07-26 ENCOUNTER — ANTICOAGULATION VISIT (OUTPATIENT)
Dept: PHARMACY | Facility: HOSPITAL | Age: 82
End: 2022-07-26

## 2022-07-26 ENCOUNTER — CLINICAL SUPPORT (OUTPATIENT)
Dept: CARDIOLOGY | Facility: CLINIC | Age: 82
End: 2022-07-26

## 2022-07-26 DIAGNOSIS — I48.0 PAF (PAROXYSMAL ATRIAL FIBRILLATION): Primary | ICD-10-CM

## 2022-07-26 LAB — INR PPP: 2.6 (ref 0.9–1.1)

## 2022-07-26 RX ORDER — WARFARIN SODIUM 5 MG/1
5 TABLET ORAL
COMMUNITY
End: 2022-07-26 | Stop reason: SDUPTHER

## 2022-07-26 RX ORDER — WARFARIN SODIUM 5 MG/1
5 TABLET ORAL
Qty: 90 TABLET | Refills: 1 | Status: SHIPPED | OUTPATIENT
Start: 2022-07-26 | End: 2022-08-16

## 2022-07-26 NOTE — PROGRESS NOTES
Anticoagulation Clinic - Remote Progress Note  Remote Lab    Indication: paroxysmal afib  Referring Provider: Buddy Javier  Initial Warfarin Start Date:   Goal INR: 2.0-3.0  Current Drug Interactions: omega three fatty acid; CoQ-10   [id number]: 3 (HTN, Age)    Diet: ~ 2 Tablespoon QOD mixed cooked greens; green beans 2x weekly 7/26/22  Alcohol: None  Tobacco: None  OTC Pain Medication: APAP PRN    INR History:  Date 3/23 3/30 4/8 4/16 4/23 5/7 6/4 6/18 7/2 7/19 7/28 8/11   Total Weekly Dose 27.5mg 25mg 25mg 25mg 25mg 25mg 25mg 25mg 25mg 25mg 25mg 25mg   INR 3.2 2.7 3.2 2.0 2.4 2.7 1.8 1.8 2.3 1.9 1.9 2.6   Notes   LD doxy 4/8    incr GLV  decr GLV incr GLV  incr GLV decr GLV     Date 8/25 9/22 10/20 11/18 11/29 12/28  3/2/22 3/1/ 4/5/22 4/11 4/27   Total WeeklyDose 25mg 25mg 25mg 25mg 25mg 25mg non- compliant 25mg 25 mg 25 mg 25mg 25 mg   INR 2.1 2.8 2.8 3.1 2.4 2.0  2.9 3.2 3.5 2.5 2.8   Notes    decr GLV    APAP  decr GLV       Date 5/31 6/28 7/26           Total Weekly Dose 25 mg 25 mg 25 mg           INR 2.9 2.8 2.6           Notes                Phone Interview:  Tablet Strength: 5mg  Patient Contact Info: [phone] (Mobile)  Estimated OOP cost: will send if patient comes to [place]   Verbal Release Auth:   Lab Contact Info: [facility]    Patient Findings:  Negatives:  Signs/symptoms of thrombosis, Signs/symptoms of bleeding, Laboratory test error suspected, Change in health, Change in alcohol use, Change in activity, Upcoming invasive procedure, Emergency department visit, Upcoming dental procedure, Missed doses, Extra doses, Change in medications, Change in diet/appetite, Hospital admission, Bruising, Other complaints   Comments:  All findings negative per patient.     Plan:  1. INR was therapeutic today at 2.6. Instructed Mr. Houser to continue maintenance dose of warfarin 2.5 mg oral daily except 5 mg on MonWedSat until recheck (25 mg/week).  2. Repeat INR 4 weeks, 8/23/22.  3.  Verbal information provided over the phone. Kurt Houser RBV dosing instructions, expresses understanding by teach back, and has no further questions at this time.    Kari Ortega, TRES  7/26/2022  14:51 EDT     I, Regine Adame, PharmD, have reviewed the note in full and agree with the assessment and plan.  07/26/22  16:21 EDT

## 2022-08-15 DIAGNOSIS — G62.9 NEUROPATHY: Primary | ICD-10-CM

## 2022-08-16 RX ORDER — MELOXICAM 15 MG/1
TABLET ORAL
Qty: 30 TABLET | Refills: 0 | Status: SHIPPED | OUTPATIENT
Start: 2022-08-16 | End: 2023-03-16 | Stop reason: SDUPTHER

## 2022-08-16 RX ORDER — GABAPENTIN 600 MG/1
TABLET ORAL
Qty: 180 TABLET | Refills: 0 | Status: SHIPPED | OUTPATIENT
Start: 2022-08-16 | End: 2022-09-15

## 2022-08-16 NOTE — TELEPHONE ENCOUNTER
Rx Refill Note    Requested Prescriptions     Pending Prescriptions Disp Refills   • meloxicam (MOBIC) 15 MG tablet [Pharmacy Med Name: Meloxicam 15 MG Oral Tablet] 30 tablet 0     Sig: Take 1 tablet by mouth once daily   • gabapentin (NEURONTIN) 600 MG tablet [Pharmacy Med Name: Gabapentin 600 MG Oral Tablet] 180 tablet 0     Sig: TAKE 2 TABLETS BY MOUTH THREE TIMES DAILY        Last office visit with prescribing clinician: 6/21/2022      Next office visit with prescribing clinician: 9/21/2022   Last labs:   Last refill: gabapentin last sent 03/17/2022 for 180 with 2 refills   Pharmacy (be sure to add in Epic). correct

## 2022-08-25 ENCOUNTER — CLINICAL SUPPORT (OUTPATIENT)
Dept: CARDIOLOGY | Facility: CLINIC | Age: 82
End: 2022-08-25

## 2022-08-25 ENCOUNTER — ANTICOAGULATION VISIT (OUTPATIENT)
Dept: PHARMACY | Facility: HOSPITAL | Age: 82
End: 2022-08-25

## 2022-08-25 DIAGNOSIS — I48.0 PAF (PAROXYSMAL ATRIAL FIBRILLATION): Primary | ICD-10-CM

## 2022-08-25 LAB — INR PPP: 3.5 (ref 0.9–1.1)

## 2022-08-25 NOTE — PROGRESS NOTES
Anticoagulation Clinic - Remote Progress Note  Remote Lab    Indication: paroxysmal afib  Referring Provider: Buddy Javier  Initial Warfarin Start Date:   Goal INR: 2.0-3.0  Current Drug Interactions: omega three fatty acid; CoQ-10   [id number]: 3 (HTN, Age)    Diet: ~ 2 Tablespoon QOD mixed cooked greens; green beans 2x weekly 7/26/22  Alcohol: None  Tobacco: None  OTC Pain Medication: APAP PRN    INR History:  Date 3/23 3/30 4/8 4/16 4/23 5/7 6/4 6/18 7/2 7/19 7/28 8/11   Total Weekly Dose 27.5mg 25mg 25mg 25mg 25mg 25mg 25mg 25mg 25mg 25mg 25mg 25mg   INR 3.2 2.7 3.2 2.0 2.4 2.7 1.8 1.8 2.3 1.9 1.9 2.6   Notes   LD doxy 4/8    incr GLV  decr GLV incr GLV  incr GLV decr GLV     Date 8/25 9/22 10/20 11/18 11/29 12/28  3/2/22 3/1/ 4/5/22 4/11 4/27   Total WeeklyDose 25mg 25mg 25mg 25mg 25mg 25mg non- compliant 25mg 25 mg 25 mg 25mg 25 mg   INR 2.1 2.8 2.8 3.1 2.4 2.0  2.9 3.2 3.5 2.5 2.8   Notes    decr GLV    APAP  decr GLV       Date 5/31 6/28 7/26 8/25          Total Weekly Dose 25 mg 25 mg 25 mg 25 mg          INR 2.9 2.8 2.6 3.5          Notes                Phone Interview:  Tablet Strength: 5mg  Patient Contact Info: [phone] (Mobile)  Estimated OOP cost: will send if patient comes to [place]   Verbal Release Auth:   Lab Contact Info: [facility]    Patient Findings:  Positives:  Change in diet/appetite   Negatives:  Signs/symptoms of thrombosis, Signs/symptoms of bleeding, Laboratory test error suspected, Change in health, Change in alcohol use, Change in activity, Upcoming invasive procedure, Emergency department visit, Upcoming dental procedure, Missed doses, Extra doses, Change in medications, Hospital admission, Bruising, Other complaints   Comments:  He thinks he didn't eat as much GLV as he normally does       Plan:  1. INR was SUPRAtherapeutic today at 3.5. Spoke to Mr Houser on 8/26. Instructed Mr. Houser to hold today's dose, then continue maintenance dose of warfarin 2.5 mg  oral daily except 5 mg on MonWedSat until recheck (25 mg/week).  2. Repeat INR in two weeks 9/8.  3. Verbal information provided over the phone. Kutr Houser RBV dosing instructions, expresses understanding by teach back, and has no further questions at this time.    Charmaine Avery, PharmD, BCPS   8/26/2022  08:52 EDT

## 2022-09-08 ENCOUNTER — CLINICAL SUPPORT (OUTPATIENT)
Dept: CARDIOLOGY | Facility: CLINIC | Age: 82
End: 2022-09-08

## 2022-09-08 ENCOUNTER — ANTICOAGULATION VISIT (OUTPATIENT)
Dept: PHARMACY | Facility: HOSPITAL | Age: 82
End: 2022-09-08

## 2022-09-08 DIAGNOSIS — I48.0 PAF (PAROXYSMAL ATRIAL FIBRILLATION): Primary | ICD-10-CM

## 2022-09-08 LAB — INR PPP: 2.9 (ref 0.9–1.1)

## 2022-09-08 NOTE — PROGRESS NOTES
Anticoagulation Clinic - Remote Progress Note  Remote Lab    Indication: paroxysmal afib  Referring Provider: Buddy Javier  Initial Warfarin Start Date:   Goal INR: 2.0-3.0  Current Drug Interactions: omega three fatty acid; CoQ-10   [id number]: 3 (HTN, Age)    Diet: ~ 2 Tablespoon QOD mixed cooked greens; green beans 2x weekly 7/26/22  Alcohol: None  Tobacco: None  OTC Pain Medication: APAP PRN    INR History:  Date 3/23 3/30 4/8 4/16 4/23 5/7 6/4 6/18 7/2 7/19 7/28 8/11   Total Weekly Dose 27.5mg 25mg 25mg 25mg 25mg 25mg 25mg 25mg 25mg 25mg 25mg 25mg   INR 3.2 2.7 3.2 2.0 2.4 2.7 1.8 1.8 2.3 1.9 1.9 2.6   Notes   LD doxy 4/8    incr GLV  decr GLV incr GLV  incr GLV decr GLV     Date 8/25 9/22 10/20 11/18 11/29 12/28  3/2/22 3/1/ 4/5/22 4/11 4/27   Total WeeklyDose 25mg 25mg 25mg 25mg 25mg 25mg non- compliant 25mg 25 mg 25 mg 25mg 25 mg   INR 2.1 2.8 2.8 3.1 2.4 2.0  2.9 3.2 3.5 2.5 2.8   Notes    decr GLV    APAP  decr GLV       Date 5/31 6/28 7/26 8/25 9/8         Total Weekly Dose 25 mg 25 mg 25 mg 25 mg 25 mg         INR 2.9 2.8 2.6 3.5 2.9         Notes                Phone Interview:  Tablet Strength: 5mg  Patient Contact Info: [phone] (Mobile)  Estimated OOP cost: will send if patient comes to [place]   Verbal Release Auth:   Lab Contact Info: [facility]    Patient Findings:  Negatives:  Signs/symptoms of thrombosis, Signs/symptoms of bleeding, Laboratory test error suspected, Change in health, Change in alcohol use, Change in activity, Upcoming invasive procedure, Emergency department visit, Upcoming dental procedure, Missed doses, Extra doses, Change in medications, Change in diet/appetite, Hospital admission, Bruising, Other complaints   Comments:  All findings negative per patient       Plan:  1. INR is therapeutic yesterday, 9/8, at 2.9 (goal 2.0-3.0), unable to reach until 9/9. Instructed Mr. Houser to continue maintenance dose of warfarin 2.5 mg oral daily except 5 mg on  MonWedSat until recheck (25 mg/week). Mr. Houser will increase GLV servings, continuing to each GLV every other day.  2. Repeat INR in four weeks 10/6.  3. Verbal information provided over the phone. Kurt Houser RBV dosing instructions, expresses understanding by teach back, and has no further questions at this time.    Erin Squires, Pharmacy Intern  9/8/2022  15:35 EDT    I, Micky Yeboah, PharmD, have reviewed the note in full and agree with the assessment and plan.  09/09/22  11:38 EDT

## 2022-09-15 DIAGNOSIS — G62.9 NEUROPATHY: ICD-10-CM

## 2022-09-15 RX ORDER — AMLODIPINE BESYLATE 5 MG/1
TABLET ORAL
Qty: 90 TABLET | Refills: 0 | Status: SHIPPED | OUTPATIENT
Start: 2022-09-15 | End: 2022-09-19 | Stop reason: SDUPTHER

## 2022-09-15 RX ORDER — GABAPENTIN 600 MG/1
TABLET ORAL
Qty: 180 TABLET | Refills: 0 | Status: SHIPPED | OUTPATIENT
Start: 2022-09-15 | End: 2022-09-21 | Stop reason: SDUPTHER

## 2022-09-15 NOTE — TELEPHONE ENCOUNTER
Rx Refill Note    Requested Prescriptions     Pending Prescriptions Disp Refills   • amLODIPine (NORVASC) 5 MG tablet [Pharmacy Med Name: amLODIPine Besylate 5 MG Oral Tablet] 90 tablet 0     Sig: Take 1 tablet by mouth once daily   • gabapentin (NEURONTIN) 600 MG tablet [Pharmacy Med Name: Gabapentin 600 MG Oral Tablet] 180 tablet 0     Sig: TAKE 2 TABLETS BY MOUTH THREE TIMES DAILY        Last office visit with prescribing clinician: 6/21/2022      Next office visit with prescribing clinician: 9/21/2022   Last labs: 09/08/2022  Last refill:  12/15/2021  Pharmacy  [facility]

## 2022-09-19 RX ORDER — AMLODIPINE BESYLATE 5 MG/1
TABLET ORAL
Qty: 90 TABLET | Refills: 0 | OUTPATIENT
Start: 2022-09-19

## 2022-09-19 RX ORDER — AMLODIPINE BESYLATE 5 MG/1
5 TABLET ORAL DAILY
Qty: 90 TABLET | Refills: 1 | Status: SHIPPED | OUTPATIENT
Start: 2022-09-19 | End: 2022-11-30 | Stop reason: SDUPTHER

## 2022-09-19 RX ORDER — AMLODIPINE BESYLATE 5 MG/1
5 TABLET ORAL DAILY
Qty: 90 TABLET | Refills: 1 | Status: SHIPPED | OUTPATIENT
Start: 2022-09-19 | End: 2023-03-16 | Stop reason: SDUPTHER

## 2022-09-19 NOTE — TELEPHONE ENCOUNTER
Rx Refill Note    Requested Prescriptions     Pending Prescriptions Disp Refills   • amLODIPine (NORVASC) 5 MG tablet [Pharmacy Med Name: amLODIPine Besylate 5 MG Oral Tablet] 90 tablet 0     Sig: Take 1 tablet by mouth once daily        Last office visit with prescribing clinician: 6/21/2022      Next office visit with prescribing clinician: 9/21/2022   Last labs: 05/09/2022  Last refill: 09/15/2022   Pharmacy [facility]

## 2022-09-21 ENCOUNTER — OFFICE VISIT (OUTPATIENT)
Dept: FAMILY MEDICINE CLINIC | Facility: CLINIC | Age: 82
End: 2022-09-21

## 2022-09-21 VITALS
HEART RATE: 72 BPM | TEMPERATURE: 96.8 F | OXYGEN SATURATION: 97 % | DIASTOLIC BLOOD PRESSURE: 61 MMHG | WEIGHT: 228.8 LBS | HEIGHT: 74 IN | RESPIRATION RATE: 12 BRPM | SYSTOLIC BLOOD PRESSURE: 121 MMHG | BODY MASS INDEX: 29.36 KG/M2

## 2022-09-21 DIAGNOSIS — E55.9 VITAMIN D DEFICIENCY: ICD-10-CM

## 2022-09-21 DIAGNOSIS — R73.9 HYPERGLYCEMIA: ICD-10-CM

## 2022-09-21 DIAGNOSIS — E53.8 VITAMIN B12 DEFICIENCY: ICD-10-CM

## 2022-09-21 DIAGNOSIS — E03.9 ACQUIRED HYPOTHYROIDISM: ICD-10-CM

## 2022-09-21 DIAGNOSIS — G62.9 NEUROPATHY: ICD-10-CM

## 2022-09-21 DIAGNOSIS — E78.2 HYPERLIPIDEMIA, MIXED: Primary | ICD-10-CM

## 2022-09-21 PROCEDURE — 99213 OFFICE O/P EST LOW 20 MIN: CPT | Performed by: FAMILY MEDICINE

## 2022-09-21 RX ORDER — GABAPENTIN 600 MG/1
1200 TABLET ORAL 3 TIMES DAILY
Qty: 180 TABLET | Refills: 2 | Status: SHIPPED | OUTPATIENT
Start: 2022-09-21 | End: 2022-12-21 | Stop reason: SDUPTHER

## 2022-09-21 NOTE — PROGRESS NOTES
Patient Name: Kurt Houser  : 1940   MRN: [id number]     Chief Complaint:    Chief Complaint   Patient presents with   • Med Refill     Pt here for medication refills and recheck       History of Present Illness: Kurt Houser is a 82 y.o. male who is here today for follow up on Neuropathy  HPI        Review of Systems:   Review of Systems   Constitutional: Negative.    HENT: Negative.    Eyes: Negative.    Respiratory: Negative.    Cardiovascular: Negative.    Gastrointestinal: Negative.    Neurological: Negative.         Past Medical History:   Past Medical History:   Diagnosis Date   • Acute osteomyelitis of foot (HCC)    • Amputated toe of left foot (HCC)    • Anemia of decreased vitamin B12 absorption    • BMI 27.0-27.9,adult    • Degenerative joint disease involving multiple joints    • Diverticular disease of colon    • Drug therapy    • Dyslipidemia    • Essential hypertension    • Foot osteomyelitis (HCC)    • Frequent PVCs    • Glaucoma    • High risk medication use    • History of adenomatous polyp of colon    • Long term current use of anticoagulant    • Low density lipoprotein (LDL) cholesterol level below 100mg/dL    • Mixed hyperlipidemia    • Neuropathy    • Osteoarthritis    • Paroxysmal atrial fibrillation (HCC)    • Polyneuropathy    • Prediabetes    • Ventricular premature beats    • Vitamin B12 deficiency    • Vitamin D deficiency        Past Surgical History:   Past Surgical History:   Procedure Laterality Date   • CATARACT EXTRACTION, BILATERAL     • FOOT SURGERY Left    • TRANS METATARSAL AMPUTATION  2020    5th metatarsal amputation/IV abx   • WRIST SURGERY Left        Family History:   Family History   Problem Relation Age of Onset   • Colon cancer Father    • Liver cancer Brother        Social History:   Social History     Socioeconomic History   • Marital status:    Tobacco Use   • Smoking status: Former Smoker     Packs/day: 1.00     Years: 27.00     Pack  years: 27.00     Types: Cigarettes, Cigars     Start date:      Quit date: 2000     Years since quittin.7   • Smokeless tobacco: Never Used   • Tobacco comment: 1 cigar/day, 2.40 years   Vaping Use   • Vaping Use: Never used   Substance and Sexual Activity   • Alcohol use: Not Currently     Comment: about every 1-2 years he nikkyt have a drink   • Drug use: Never   • Sexual activity: Defer       Medications:     Current Outpatient Medications:   •  amLODIPine (NORVASC) 5 MG tablet, Take 1 tablet by mouth Daily., Disp: 90 tablet, Rfl: 1  •  amLODIPine (NORVASC) 5 MG tablet, Take 1 tablet by mouth Daily., Disp: 90 tablet, Rfl: 1  •  cholecalciferol (VITAMIN D3) 25 MCG (1000 UT) tablet, Take  by mouth. As directed, Disp: , Rfl:   •  Coenzyme Q10 (CoQ-10) 100 MG capsule, Take  by mouth Daily., Disp: , Rfl:   •  Cyanocobalamin (B-12) 1000 MCG capsule, Take  by mouth., Disp: , Rfl:   •  gabapentin (NEURONTIN) 600 MG tablet, Take 2 tablets by mouth 3 (Three) Times a Day., Disp: 180 tablet, Rfl: 2  •  levothyroxine (SYNTHROID, LEVOTHROID) 25 MCG tablet, Take 1 tablet by mouth Daily., Disp: , Rfl:   •  lisinopril (PRINIVIL,ZESTRIL) 40 MG tablet, Take 1 tablet by mouth Daily., Disp: 90 tablet, Rfl: 1  •  meloxicam (MOBIC) 15 MG tablet, Take 1 tablet by mouth once daily, Disp: 30 tablet, Rfl: 0  •  Omega-3 Fatty Acids (fish oil) 1000 MG capsule capsule, Take  by mouth Daily With Breakfast., Disp: , Rfl:   •  rosuvastatin (CRESTOR) 20 MG tablet, Take 1 tablet by mouth Daily., Disp: 90 tablet, Rfl: 3    Allergies:   Allergies   Allergen Reactions   • Acetaminophen Mental Status Change   • Hydrocodone Bitart (Antituss) [Hydrocodone] Mental Status Change         Physical Exam:  Vital Signs:   Vitals:    22 1303   BP: 121/61   BP Location: Left arm   Patient Position: Sitting   Cuff Size: Adult   Pulse: 72   Resp: 12   Temp: 96.8 °F (36 °C)   TempSrc: Temporal   SpO2: 97%   Weight: 104 kg (228 lb 12.8 oz)   Height:  "188 cm (74\")   PainSc: 0-No pain     Body mass index is 29.38 kg/m².     Physical Exam  Vitals and nursing note reviewed.   Constitutional:       Appearance: Normal appearance. He is normal weight.   HENT:      Head: Normocephalic and atraumatic.      Right Ear: Tympanic membrane, ear canal and external ear normal.      Left Ear: Tympanic membrane, ear canal and external ear normal.      Nose: Nose normal.      Mouth/Throat:      Mouth: Mucous membranes are dry.      Pharynx: Oropharynx is clear.   Eyes:      Extraocular Movements: Extraocular movements intact.      Conjunctiva/sclera: Conjunctivae normal.      Pupils: Pupils are equal, round, and reactive to light.   Cardiovascular:      Rate and Rhythm: Normal rate and regular rhythm.      Pulses: Normal pulses.      Heart sounds: Normal heart sounds.   Pulmonary:      Effort: Pulmonary effort is normal.      Breath sounds: Normal breath sounds.   Musculoskeletal:      Cervical back: Normal range of motion and neck supple.   Feet:      Comments:      Neurological:      Mental Status: He is alert.         Procedures      Assessment/Plan:   Diagnoses and all orders for this visit:    1. Hyperlipidemia, mixed (Primary)  -     Hemoglobin A1c; Future  -     Lipid Panel; Future  -     Comprehensive Metabolic Panel; Future  -     Vitamin B12; Future  -     Vitamin D 25 Hydroxy; Future  -     TSH Rfx On Abnormal To Free T4; Future  -     CBC & Differential; Future    2. Neuropathy  Assessment & Plan:  Refill gabapentin.  House bill 1.    Orders:  -     gabapentin (NEURONTIN) 600 MG tablet; Take 2 tablets by mouth 3 (Three) Times a Day.  Dispense: 180 tablet; Refill: 2  -     Hemoglobin A1c; Future  -     Lipid Panel; Future  -     Comprehensive Metabolic Panel; Future  -     Vitamin B12; Future  -     Vitamin D 25 Hydroxy; Future  -     TSH Rfx On Abnormal To Free T4; Future  -     CBC & Differential; Future    3. Vitamin D deficiency  -     Hemoglobin A1c; Future  -     " Lipid Panel; Future  -     Comprehensive Metabolic Panel; Future  -     Vitamin B12; Future  -     Vitamin D 25 Hydroxy; Future  -     TSH Rfx On Abnormal To Free T4; Future  -     CBC & Differential; Future    4. Vitamin B12 deficiency  -     Hemoglobin A1c; Future  -     Lipid Panel; Future  -     Comprehensive Metabolic Panel; Future  -     Vitamin B12; Future  -     Vitamin D 25 Hydroxy; Future  -     TSH Rfx On Abnormal To Free T4; Future  -     CBC & Differential; Future    5. Hyperglycemia  -     Hemoglobin A1c; Future  -     Lipid Panel; Future  -     Comprehensive Metabolic Panel; Future  -     Vitamin B12; Future  -     Vitamin D 25 Hydroxy; Future  -     TSH Rfx On Abnormal To Free T4; Future  -     CBC & Differential; Future    6. Acquired hypothyroidism  -     Hemoglobin A1c; Future  -     Lipid Panel; Future  -     Comprehensive Metabolic Panel; Future  -     Vitamin B12; Future  -     Vitamin D 25 Hydroxy; Future  -     TSH Rfx On Abnormal To Free T4; Future  -     CBC & Differential; Future           Follow Up:   Return in about 3 months (around 12/21/2022) for Bloodwork 1 week prior to next appointment.    Leo Cordova MD  [facility]

## 2022-10-06 ENCOUNTER — ANTICOAGULATION VISIT (OUTPATIENT)
Dept: PHARMACY | Facility: HOSPITAL | Age: 82
End: 2022-10-06

## 2022-10-06 ENCOUNTER — CLINICAL SUPPORT (OUTPATIENT)
Dept: CARDIOLOGY | Facility: CLINIC | Age: 82
End: 2022-10-06

## 2022-10-06 DIAGNOSIS — I48.0 PAF (PAROXYSMAL ATRIAL FIBRILLATION): Primary | ICD-10-CM

## 2022-10-06 LAB — INR PPP: 3.5 (ref 0.9–1.1)

## 2022-10-06 NOTE — PROGRESS NOTES
Anticoagulation Clinic - Remote Progress Note  Remote Lab    Indication: paroxysmal afib  Referring Provider: Buddy Javier  Initial Warfarin Start Date:   Goal INR: 2.0-3.0  Current Drug Interactions: omega three fatty acid; CoQ-10   [id number]: 3 (HTN, Age)    Diet: ~ 2 Tablespoon QOD mixed cooked greens; green beans 2x weekly 7/26/22  Alcohol: None  Tobacco: None  OTC Pain Medication: APAP PRN    INR History:  Date 3/23 3/30 4/8 4/16 4/23 5/7 6/4 6/18 7/2 7/19 7/28 8/11   Total Weekly Dose 27.5mg 25mg 25mg 25mg 25mg 25mg 25mg 25mg 25mg 25mg 25mg 25mg   INR 3.2 2.7 3.2 2.0 2.4 2.7 1.8 1.8 2.3 1.9 1.9 2.6   Notes   LD doxy 4/8    incr GLV  decr GLV incr GLV  incr GLV decr GLV     Date 8/25 9/22 10/20 11/18 11/29 12/28  3/2/22 3/1/ 4/5/22 4/11 4/27   Total WeeklyDose 25mg 25mg 25mg 25mg 25mg 25mg non- compliant 25mg 25 mg 25 mg 25mg 25 mg   INR 2.1 2.8 2.8 3.1 2.4 2.0  2.9 3.2 3.5 2.5 2.8   Notes    decr GLV    APAP  decr GLV       Date 5/31 6/28 7/26 8/25 9/8 10/6        Total Weekly Dose 25 mg 25 mg 25 mg 25 mg 25 mg 25 mg        INR 2.9 2.8 2.6 3.5 2.9 3.5        Notes                Phone Interview:  Tablet Strength: 5mg  Patient Contact Info: [phone] (Mobile)  Estimated OOP cost: will send if patient comes to [place]   Verbal Release Auth:   Lab Contact Info: [facility] POCT    Patient Findings:  Positives:  Missed doses   Negatives:  Signs/symptoms of thrombosis, Signs/symptoms of bleeding, Laboratory test error suspected, Change in health, Change in alcohol use, Change in activity, Upcoming invasive procedure, Emergency department visit, Upcoming dental procedure, Extra doses, Change in medications, Change in diet/appetite, Hospital admission, Bruising, Other complaints   Comments:  Take Tylenol PRN and meloxicam PRN, most recent doses yesterday but says taking at roughly same frequency overall as he had previously. No bruising/bleeding . Had normal GLV intake every other day including a big  Caesar salad last night ([place] lettuce). Patient also mentioned that he self-held dose last night.       Plan:  1. INR is supratherapeutic 10/6, at 3.5 (goal 2.0-3.0). Discussed with patient 10/7, patient self-held dose 10/6. Instructed Mr. Houser to continue maintenance dose of warfarin 2.5 mg oral daily except 5 mg on MonWedSat until recheck (25 mg/week) as he already self-held dose last night. Also instructed Mr. Houser to continue normal GLV servings every other day for now. Patient has been very stable on 25 mg weekly dose so will try to keep him on it for now.  2. Repeat INR in 2 weeks, 10/20.  3. Verbal information provided over the phone. Kurt Houser RBV dosing instructions, expresses understanding by teach back, and has no further questions at this time.    Harman Salas, PharmD, BCPS  10/7/2022  14:56 EDT

## 2022-10-20 ENCOUNTER — ANTICOAGULATION VISIT (OUTPATIENT)
Dept: PHARMACY | Facility: HOSPITAL | Age: 82
End: 2022-10-20

## 2022-10-20 ENCOUNTER — CLINICAL SUPPORT (OUTPATIENT)
Dept: CARDIOLOGY | Facility: CLINIC | Age: 82
End: 2022-10-20

## 2022-10-20 DIAGNOSIS — I48.0 PAF (PAROXYSMAL ATRIAL FIBRILLATION): Primary | ICD-10-CM

## 2022-10-20 LAB — INR PPP: 2.5 (ref 0.9–1.1)

## 2022-10-20 NOTE — PROGRESS NOTES
Anticoagulation Clinic - Remote Progress Note  Remote Lab    Indication: paroxysmal afib  Referring Provider: Buddy Javier  Initial Warfarin Start Date:   Goal INR: 2.0-3.0  Current Drug Interactions: omega three fatty acid; CoQ-10   [id number]: 3 (HTN, Age)    Diet: ~ 2 Tablespoon QOD mixed cooked greens; green beans 2x weekly 7/26/22  Alcohol: None  Tobacco: None  OTC Pain Medication: APAP PRN    INR History:  Date 3/23 3/30 4/8 4/16 4/23 5/7 6/4 6/18 7/2 7/19 7/28 8/11   Total Weekly Dose 27.5mg 25mg 25mg 25mg 25mg 25mg 25mg 25mg 25mg 25mg 25mg 25mg   INR 3.2 2.7 3.2 2.0 2.4 2.7 1.8 1.8 2.3 1.9 1.9 2.6   Notes   LD doxy 4/8    incr GLV  decr GLV incr GLV  incr GLV decr GLV     Date 8/25 9/22 10/20 11/18 11/29 12/28  3/2/22 3/1/ 4/5/22 4/11 4/27   Total WeeklyDose 25mg 25mg 25mg 25mg 25mg 25mg non- compliant 25mg 25 mg 25 mg 25mg 25 mg   INR 2.1 2.8 2.8 3.1 2.4 2.0  2.9 3.2 3.5 2.5 2.8   Notes    decr GLV    APAP  decr GLV       Date 5/31 6/28 7/26 8/25 9/8 10/6 10/20       Total Weekly Dose 25 mg 25 mg 25 mg 25 mg 25 mg 25 mg 25mg       INR 2.9 2.8 2.6 3.5 2.9 3.5 2.5       Notes                Phone Interview:  Tablet Strength: 5mg  Patient Contact Info: [phone] (Mobile)  Estimated OOP cost: will send if patient comes to [place]   Verbal Release Auth:   Lab Contact Info: [facility] PO[facility]    Patient Findings:      Negatives:  Signs/symptoms of thrombosis, Signs/symptoms of bleeding, Laboratory test error suspected, Change in health, Change in alcohol use, Change in activity, Upcoming invasive procedure, Emergency department visit, Upcoming dental procedure, Missed doses, Extra doses, Change in medications, Change in diet/appetite, Hospital admission, Bruising, Other complaints       Plan:  1. INR is therapeutic at 2.5 (goal 2.0-3.0). . Instructed Mr. Houser to continue maintenance dose of warfarin 2.5 mg oral daily except 5 mg on MonWedSat until recheck (25 mg/week)   2. Repeat INR in 3 weeks  11/10  3. Verbal information provided over the phone. Kurt Houser RBV dosing instructions, expresses understanding by teach back, and has no further questions at this time.      Bean AlvarezD.  10/21/22   11:18 EDT

## 2022-11-10 ENCOUNTER — ANTICOAGULATION VISIT (OUTPATIENT)
Dept: PHARMACY | Facility: HOSPITAL | Age: 82
End: 2022-11-10

## 2022-11-10 ENCOUNTER — CLINICAL SUPPORT (OUTPATIENT)
Dept: CARDIOLOGY | Facility: CLINIC | Age: 82
End: 2022-11-10

## 2022-11-10 DIAGNOSIS — I48.0 PAF (PAROXYSMAL ATRIAL FIBRILLATION): Primary | ICD-10-CM

## 2022-11-10 LAB — INR PPP: 2.6 (ref 0.9–1.1)

## 2022-11-14 NOTE — PROGRESS NOTES
Anticoagulation Clinic - Remote Progress Note  Remote Lab    Indication: paroxysmal afib  Referring Provider: Buddy Javier  Initial Warfarin Start Date:   Goal INR: 2.0-3.0  Current Drug Interactions: omega three fatty acid; CoQ-10   [id number]: 3 (HTN, Age)    Diet: ~ 2 Tablespoon QOD mixed cooked greens; green beans 2x weekly 7/26/22  Alcohol: None  Tobacco: None  OTC Pain Medication: APAP PRN    INR History:  Date 3/23 3/30 4/8 4/16 4/23 5/7 6/4 6/18 7/2 7/19 7/28 8/11   Total Weekly Dose 27.5mg 25mg 25mg 25mg 25mg 25mg 25mg 25mg 25mg 25mg 25mg 25mg   INR 3.2 2.7 3.2 2.0 2.4 2.7 1.8 1.8 2.3 1.9 1.9 2.6   Notes   LD doxy 4/8    incr GLV  decr GLV incr GLV  incr GLV decr GLV     Date 8/25 9/22 10/20 11/18 11/29 12/28  3/2/22 3/1/ 4/5/22 4/11 4/27   Total WeeklyDose 25mg 25mg 25mg 25mg 25mg 25mg non- compliant 25mg 25 mg 25 mg 25mg 25 mg   INR 2.1 2.8 2.8 3.1 2.4 2.0  2.9 3.2 3.5 2.5 2.8   Notes    decr GLV    APAP  decr GLV       Date 5/31 6/28 7/26 8/25 9/8 10/6 10/20 11/10      Total Weekly Dose 25 mg 25 mg 25 mg 25 mg 25 mg 25 mg 25mg 25mg      INR 2.9 2.8 2.6 3.5 2.9 3.5 2.5 2.6      Notes                Phone Interview:  Tablet Strength: 5mg  Patient Contact Info: [phone] (Mobile)  Estimated OOP cost: will send if patient comes to [place]   Verbal Release Auth:   Lab Contact Info: [facility] PO[facility]    Patient Findings:  Negatives:  Signs/symptoms of thrombosis, Signs/symptoms of bleeding, Laboratory test error suspected, Change in health, Change in alcohol use, Change in activity, Upcoming invasive procedure, Emergency department visit, Upcoming dental procedure, Missed doses, Extra doses, Change in medications, Change in diet/appetite, Hospital admission, Bruising, Other complaints       Plan:  1. INR is therapeutic at 2.6 (goal 2.0-3.0). . Instructed Mr. Houser to continue maintenance dose of warfarin 2.5 mg oral daily except 5 mg on MonWedSat until recheck (25 mg/week)   2. Repeat INR in 3  weeks 12/01/2022  3. Verbal information provided over the phone. Kurt Houser RBV dosing instructions, expresses understanding by teach back, and has no further questions at this time.      Alannah Mcleod, PharmD Candidate  11/14/22   13:14 EST    If WNL can resume q4w  I, Regine Adame, PharmD, have reviewed the note in full and agree with the assessment and plan.  11/14/22  15:41 EST

## 2022-11-30 ENCOUNTER — OFFICE VISIT (OUTPATIENT)
Dept: CARDIOLOGY | Facility: CLINIC | Age: 82
End: 2022-11-30

## 2022-11-30 ENCOUNTER — ANTICOAGULATION VISIT (OUTPATIENT)
Dept: PHARMACY | Facility: HOSPITAL | Age: 82
End: 2022-11-30

## 2022-11-30 VITALS
SYSTOLIC BLOOD PRESSURE: 134 MMHG | BODY MASS INDEX: 29.52 KG/M2 | HEART RATE: 79 BPM | WEIGHT: 230 LBS | HEIGHT: 74 IN | OXYGEN SATURATION: 99 % | RESPIRATION RATE: 16 BRPM | DIASTOLIC BLOOD PRESSURE: 72 MMHG | TEMPERATURE: 97 F

## 2022-11-30 DIAGNOSIS — I48.0 PAF (PAROXYSMAL ATRIAL FIBRILLATION): Primary | ICD-10-CM

## 2022-11-30 DIAGNOSIS — E78.2 HYPERLIPIDEMIA, MIXED: ICD-10-CM

## 2022-11-30 DIAGNOSIS — I10 HYPERTENSION, ESSENTIAL: ICD-10-CM

## 2022-11-30 LAB — INR PPP: 2.7 (ref 0.9–1.1)

## 2022-11-30 PROCEDURE — 99214 OFFICE O/P EST MOD 30 MIN: CPT | Performed by: INTERNAL MEDICINE

## 2022-11-30 PROCEDURE — 93000 ELECTROCARDIOGRAM COMPLETE: CPT | Performed by: INTERNAL MEDICINE

## 2022-11-30 RX ORDER — WARFARIN SODIUM 5 MG/1
5 TABLET ORAL DAILY
COMMUNITY
Start: 2022-10-22 | End: 2023-01-18

## 2022-11-30 NOTE — PROGRESS NOTES
[facility]  [facility]  [address] [place] [address]  Dept: [phone]  Dept Fax: [phone]    Kurt Houser  1940    Follow Up Office Visit Note    History of Present Illness:  Kurt Houser is a 82 y.o. male who presents to the clinic for Follow-up and Hypertension.  BP seems doing fine 130.80  On Lisinopril 40 mg and Amlodipine 5 mg, no complaints HR is 60 on AF just oin warfarin    The following portions of the patient's history were reviewed and updated as appropriate: allergies, current medications, past family history, past medical history, past social history, past surgical history and problem list.    Medications:  amLODIPine  B-12 capsule  cholecalciferol  CoQ-10 capsule  fish oil capsule  gabapentin  levothyroxine  lisinopril  meloxicam  rosuvastatin  warfarin    Subjective  Allergies   Allergen Reactions   • Acetaminophen Mental Status Change   • Hydrocodone Bitart (Antituss) [Hydrocodone] Mental Status Change        Past Medical History:   Diagnosis Date   • Acute osteomyelitis of foot (HCC)    • Amputated toe of left foot (HCC)    • Anemia of decreased vitamin B12 absorption    • BMI 27.0-27.9,adult    • Degenerative joint disease involving multiple joints    • Diverticular disease of colon    • Drug therapy    • Dyslipidemia    • Essential hypertension    • Foot osteomyelitis (HCC)    • Frequent PVCs    • Glaucoma    • High risk medication use    • History of adenomatous polyp of colon    • Long term current use of anticoagulant    • Low density lipoprotein (LDL) cholesterol level below 100mg/dL    • Mixed hyperlipidemia    • Neuropathy    • Osteoarthritis    • Paroxysmal atrial fibrillation (HCC)    • Polyneuropathy    • Prediabetes    • Ventricular premature beats    • Vitamin B12 deficiency    • Vitamin D deficiency        Past Surgical History:   Procedure Laterality Date   • CATARACT EXTRACTION, BILATERAL  2010   • FOOT SURGERY Left    •  "TRANS METATARSAL AMPUTATION  2020    5th metatarsal amputation/IV abx   • WRIST SURGERY Left        Family History   Problem Relation Age of Onset   • Colon cancer Father    • Liver cancer Brother         Social History     Socioeconomic History   • Marital status:    Tobacco Use   • Smoking status: Former     Packs/day: 1.00     Years: 27.00     Pack years: 27.00     Types: Cigarettes, Cigars     Start date:      Quit date: 2000     Years since quittin.9   • Smokeless tobacco: Never   • Tobacco comments:     1 cigar/day, 2.40 years   Vaping Use   • Vaping Use: Never used   Substance and Sexual Activity   • Alcohol use: Not Currently     Comment: about every 1-2 years he mighst have a drink   • Drug use: Never   • Sexual activity: Defer       Review of Systems   Constitutional: Negative.    HENT: Negative.    Respiratory: Negative.    Cardiovascular: Negative.    Endocrine: Negative.    Genitourinary: Negative.    Musculoskeletal: Negative.    Skin: Negative.    Allergic/Immunologic: Negative.    Neurological: Negative.    Hematological: Negative.    Psychiatric/Behavioral: Negative.        Cardiovascular Procedures    ECHO/MUGA:   STRESS TESTS:   CARDIAC CATH:   DEVICES:   HOLTER:   CT/MRI:   VASCULAR:   CARDIOTHORACIC:     Objective  Vitals:    22 1300   BP: 134/72   BP Location: Right arm   Patient Position: Sitting   Cuff Size: Adult   Pulse: 79   Resp: 16   Temp: 97 °F (36.1 °C)   TempSrc: Infrared   SpO2: 99%   Weight: 104 kg (230 lb)   Height: 188 cm (74\")   PainSc: 0-No pain     Body mass index is 29.53 kg/m².     Physical Exam  Vitals reviewed.   Constitutional:       Appearance: Healthy appearance. Not in distress.   Neck:      Vascular: No JVR. JVD normal.   Pulmonary:      Effort: Pulmonary effort is normal.      Breath sounds: Normal breath sounds. No wheezing. No rhonchi. No rales.   Chest:      Chest wall: Not tender to palpatation.   Cardiovascular:      PMI at left " midclavicular line. Normal rate. Irregularly irregular rhythm. Normal S1. Normal S2.      Murmurs: There is no murmur.      No gallop. No click. No rub.   Pulses:     Intact distal pulses.   Edema:     Peripheral edema absent.   Abdominal:      General: Bowel sounds are normal.      Palpations: Abdomen is soft.      Tenderness: There is no abdominal tenderness.   Musculoskeletal: Normal range of motion.         General: No tenderness. Skin:     General: Skin is warm and dry.   Neurological:      General: No focal deficit present.      Mental Status: Alert and oriented to person, place and time.          Diagnostic Data    ECG 12 Lead    Date/Time: 11/30/2022 1:20 PM  Performed by: Buddy Javier MD  Authorized by: Buddy Javier MD   Comparison: compared with previous ECG from 2/11/2021  Similar to previous ECG  Rhythm: atrial fibrillation  Ectopy: unifocal PVCs  Rate: normal  BPM: 60  QRS axis: normal    Clinical impression: abnormal EKG            Assessment and Plan  Diagnoses and all orders for this visit:    PAF (paroxysmal atrial fibrillation) (HCC)- Chronic, only on warfarin INR is 2.7 no Av blockers Hr 60  -     POC Protime / INR    Hypertension, essential- BP is 130/80on Lisinopril 40 mg and Amlodipine 5 mg,    Hyperlipidemia, mixed- On Crestor 20 mg, we need a Lipid , before next appointment    Other orders  -     warfarin (COUMADIN) 5 MG tablet; Take 5 mg by mouth Daily.         Return in about 6 months (around 5/30/2023) for Recheck.    Buddy Javier MD  11/30/2022

## 2022-12-14 ENCOUNTER — LAB (OUTPATIENT)
Dept: FAMILY MEDICINE CLINIC | Facility: CLINIC | Age: 82
End: 2022-12-14

## 2022-12-14 DIAGNOSIS — Z12.5 SPECIAL SCREENING FOR MALIGNANT NEOPLASM OF PROSTATE: Primary | ICD-10-CM

## 2022-12-14 DIAGNOSIS — E53.8 VITAMIN B12 DEFICIENCY: ICD-10-CM

## 2022-12-14 DIAGNOSIS — G62.9 NEUROPATHY: ICD-10-CM

## 2022-12-14 DIAGNOSIS — R73.9 HYPERGLYCEMIA: ICD-10-CM

## 2022-12-14 DIAGNOSIS — E03.9 ACQUIRED HYPOTHYROIDISM: ICD-10-CM

## 2022-12-14 DIAGNOSIS — E78.2 HYPERLIPIDEMIA, MIXED: ICD-10-CM

## 2022-12-14 DIAGNOSIS — E55.9 VITAMIN D DEFICIENCY: ICD-10-CM

## 2022-12-14 PROCEDURE — 36415 COLL VENOUS BLD VENIPUNCTURE: CPT | Performed by: FAMILY MEDICINE

## 2022-12-15 LAB
25(OH)D3+25(OH)D2 SERPL-MCNC: 64.6 NG/ML (ref 30–100)
ALBUMIN SERPL-MCNC: 4.3 G/DL (ref 3.6–4.6)
ALBUMIN/GLOB SERPL: 1.4 {RATIO} (ref 1.2–2.2)
ALP SERPL-CCNC: 68 IU/L (ref 44–121)
ALT SERPL-CCNC: 14 IU/L (ref 0–44)
AST SERPL-CCNC: 19 IU/L (ref 0–40)
BASOPHILS # BLD AUTO: 0 X10E3/UL (ref 0–0.2)
BASOPHILS NFR BLD AUTO: 0 %
BILIRUB SERPL-MCNC: 0.4 MG/DL (ref 0–1.2)
BUN SERPL-MCNC: 28 MG/DL (ref 8–27)
BUN/CREAT SERPL: 22 (ref 10–24)
CALCIUM SERPL-MCNC: 9.2 MG/DL (ref 8.6–10.2)
CHLORIDE SERPL-SCNC: 101 MMOL/L (ref 96–106)
CHOLEST SERPL-MCNC: 130 MG/DL (ref 100–199)
CO2 SERPL-SCNC: 25 MMOL/L (ref 20–29)
CREAT SERPL-MCNC: 1.25 MG/DL (ref 0.76–1.27)
EGFRCR SERPLBLD CKD-EPI 2021: 57 ML/MIN/1.73
EOSINOPHIL # BLD AUTO: 0.2 X10E3/UL (ref 0–0.4)
EOSINOPHIL NFR BLD AUTO: 3 %
ERYTHROCYTE [DISTWIDTH] IN BLOOD BY AUTOMATED COUNT: 13.7 % (ref 11.6–15.4)
GLOBULIN SER CALC-MCNC: 3 G/DL (ref 1.5–4.5)
GLUCOSE SERPL-MCNC: 99 MG/DL (ref 70–99)
HBA1C MFR BLD: 6.2 % (ref 4.8–5.6)
HCT VFR BLD AUTO: 36.9 % (ref 37.5–51)
HDLC SERPL-MCNC: 43 MG/DL
HGB BLD-MCNC: 12.2 G/DL (ref 13–17.7)
IMM GRANULOCYTES # BLD AUTO: 0 X10E3/UL (ref 0–0.1)
IMM GRANULOCYTES NFR BLD AUTO: 0 %
LDLC SERPL CALC-MCNC: 64 MG/DL (ref 0–99)
LYMPHOCYTES # BLD AUTO: 1.4 X10E3/UL (ref 0.7–3.1)
LYMPHOCYTES NFR BLD AUTO: 26 %
MCH RBC QN AUTO: 30.4 PG (ref 26.6–33)
MCHC RBC AUTO-ENTMCNC: 33.1 G/DL (ref 31.5–35.7)
MCV RBC AUTO: 92 FL (ref 79–97)
MONOCYTES # BLD AUTO: 0.6 X10E3/UL (ref 0.1–0.9)
MONOCYTES NFR BLD AUTO: 11 %
NEUTROPHILS # BLD AUTO: 3.4 X10E3/UL (ref 1.4–7)
NEUTROPHILS NFR BLD AUTO: 60 %
PLATELET # BLD AUTO: 183 X10E3/UL (ref 150–450)
POTASSIUM SERPL-SCNC: 4.5 MMOL/L (ref 3.5–5.2)
PROT SERPL-MCNC: 7.3 G/DL (ref 6–8.5)
PSA SERPL-MCNC: 5.3 NG/ML (ref 0–4)
RBC # BLD AUTO: 4.01 X10E6/UL (ref 4.14–5.8)
SODIUM SERPL-SCNC: 140 MMOL/L (ref 134–144)
TRIGL SERPL-MCNC: 128 MG/DL (ref 0–149)
TSH SERPL DL<=0.005 MIU/L-ACNC: 3.41 UIU/ML (ref 0.45–4.5)
VIT B12 SERPL-MCNC: 669 PG/ML (ref 232–1245)
VLDLC SERPL CALC-MCNC: 23 MG/DL (ref 5–40)
WBC # BLD AUTO: 5.6 X10E3/UL (ref 3.4–10.8)

## 2022-12-21 ENCOUNTER — OFFICE VISIT (OUTPATIENT)
Dept: FAMILY MEDICINE CLINIC | Facility: CLINIC | Age: 82
End: 2022-12-21

## 2022-12-21 VITALS
RESPIRATION RATE: 12 BRPM | BODY MASS INDEX: 30.03 KG/M2 | WEIGHT: 234 LBS | HEART RATE: 68 BPM | DIASTOLIC BLOOD PRESSURE: 80 MMHG | TEMPERATURE: 98.5 F | OXYGEN SATURATION: 97 % | SYSTOLIC BLOOD PRESSURE: 140 MMHG | HEIGHT: 74 IN

## 2022-12-21 DIAGNOSIS — I10 HYPERTENSION, ESSENTIAL: ICD-10-CM

## 2022-12-21 DIAGNOSIS — N18.31 CHRONIC KIDNEY DISEASE, STAGE 3A: ICD-10-CM

## 2022-12-21 DIAGNOSIS — G62.9 NEUROPATHY: ICD-10-CM

## 2022-12-21 DIAGNOSIS — E55.9 VITAMIN D DEFICIENCY: ICD-10-CM

## 2022-12-21 DIAGNOSIS — E78.2 HYPERLIPIDEMIA, MIXED: ICD-10-CM

## 2022-12-21 DIAGNOSIS — E53.8 VITAMIN B12 DEFICIENCY: ICD-10-CM

## 2022-12-21 DIAGNOSIS — E03.9 ACQUIRED HYPOTHYROIDISM: ICD-10-CM

## 2022-12-21 DIAGNOSIS — R73.9 HYPERGLYCEMIA: ICD-10-CM

## 2022-12-21 DIAGNOSIS — Z00.00 MEDICARE ANNUAL WELLNESS VISIT, SUBSEQUENT: Primary | ICD-10-CM

## 2022-12-21 PROCEDURE — G0439 PPPS, SUBSEQ VISIT: HCPCS | Performed by: FAMILY MEDICINE

## 2022-12-21 PROCEDURE — 1170F FXNL STATUS ASSESSED: CPT | Performed by: FAMILY MEDICINE

## 2022-12-21 PROCEDURE — 99214 OFFICE O/P EST MOD 30 MIN: CPT | Performed by: FAMILY MEDICINE

## 2022-12-21 PROCEDURE — 1159F MED LIST DOCD IN RCRD: CPT | Performed by: FAMILY MEDICINE

## 2022-12-21 RX ORDER — GABAPENTIN 600 MG/1
1200 TABLET ORAL 3 TIMES DAILY
Qty: 180 TABLET | Refills: 2 | Status: SHIPPED | OUTPATIENT
Start: 2022-12-21 | End: 2023-03-16 | Stop reason: SDUPTHER

## 2022-12-21 RX ORDER — LISINOPRIL 40 MG/1
TABLET ORAL
Qty: 90 TABLET | Refills: 0 | Status: SHIPPED | OUTPATIENT
Start: 2022-12-21 | End: 2023-03-15

## 2022-12-21 NOTE — PROGRESS NOTES
The ABCs of the Annual Wellness Visit  Subsequent Medicare Wellness Visit    Chief Complaint   Patient presents with   • Medicare Wellness-subsequent     Pt here for medicare wellness exam sub   • lab results     Pt here for lab results   • Hypertension   • Hyperlipidemia      Subjective    History of Present Illness:  Kurt Houser is a 82 y.o. male who presents for a Subsequent Medicare Wellness Visit.    The following portions of the patient's history were reviewed and   updated as appropriate: allergies, current medications, past family history, past medical history, past social history, past surgical history and problem list.    Compared to one year ago, the patient feels his physical   health is the same.    Compared to one year ago, the patient feels his mental   health is the same.    Recent Hospitalizations:  He was admitted within the past 365 days at [facility].       Current Medical Providers:  Patient Care Team:  Leo Cordova MD as PCP - General (Family Medicine)  Buddy Javier MD as Consulting Physician (Cardiology)    Outpatient Medications Prior to Visit   Medication Sig Dispense Refill   • amLODIPine (NORVASC) 5 MG tablet Take 1 tablet by mouth Daily. 90 tablet 1   • cholecalciferol (VITAMIN D3) 25 MCG (1000 UT) tablet Take  by mouth. As directed     • Coenzyme Q10 (CoQ-10) 100 MG capsule Take  by mouth Daily.     • Cyanocobalamin (B-12) 1000 MCG capsule Take  by mouth.     • gabapentin (NEURONTIN) 600 MG tablet Take 2 tablets by mouth 3 (Three) Times a Day. 180 tablet 2   • levothyroxine (SYNTHROID, LEVOTHROID) 25 MCG tablet Take 1 tablet by mouth Daily.     • lisinopril (PRINIVIL,ZESTRIL) 40 MG tablet Take 1 tablet by mouth once daily 90 tablet 0   • meloxicam (MOBIC) 15 MG tablet Take 1 tablet by mouth once daily 30 tablet 0   • Omega-3 Fatty Acids (fish oil) 1000 MG capsule capsule Take  by mouth Daily With Breakfast.     • rosuvastatin (CRESTOR) 20 MG tablet Take 1 tablet  "by mouth Daily. 90 tablet 3   • warfarin (COUMADIN) 5 MG tablet Take 5 mg by mouth Daily.       No facility-administered medications prior to visit.       No opioid medication identified on active medication list. I have reviewed chart for other potential  high risk medication/s and harmful drug interactions in the elderly.          Aspirin is not on active medication list.  Aspirin use is not indicated based on review of current medical condition/s. Risk of harm outweighs potential benefits.  .    Patient Active Problem List   Diagnosis   • PAF (paroxysmal atrial fibrillation) (HCC)   • Hypertension, essential   • Hyperlipidemia, mixed   • Borderline diabetes   • Acute osteomyelitis of foot (HCC)   • Diverticular disease of colon   • High risk medication use   • History of colonic polyps   • Long term (current) use of anticoagulants   • Low HDL (under 40)   • Vitamin B12 deficiency   • Neuropathy   • Primary osteoarthritis involving multiple joints   • Ventricular premature beats   • Vitamin D deficiency   • Hyperglycemia   • Acquired hypothyroidism   • Low back pain     Advance Care Planning  Advance Directive is not on file.  ACP discussion was declined by the patient. Patient does not have an advance directive, declines further assistance.    Review of Systems   Constitutional: Negative.    HENT: Negative.    Eyes: Negative.    Respiratory: Negative.    Cardiovascular: Negative.    Gastrointestinal: Negative.         Objective    Vitals:    12/21/22 1256   BP: 140/80   BP Location: Left arm   Patient Position: Sitting   Cuff Size: Adult   Pulse: 68   Resp: 12   Temp: 98.5 °F (36.9 °C)   TempSrc: Temporal   SpO2: 97%   Weight: 106 kg (234 lb)   Height: 188 cm (74\")   PainSc: 0-No pain     Estimated body mass index is 30.04 kg/m² as calculated from the following:    Height as of this encounter: 188 cm (74\").    Weight as of this encounter: 106 kg (234 lb).    BMI is >= 30 and <35. (Class 1 Obesity). The following " options were offered after discussion;: nutrition counseling/recommendations      Does the patient have evidence of cognitive impairment? No    Physical Exam  Vitals and nursing note reviewed.   Constitutional:       Appearance: Normal appearance. He is normal weight.   HENT:      Head: Normocephalic and atraumatic.      Right Ear: Tympanic membrane, ear canal and external ear normal.      Left Ear: Tympanic membrane, ear canal and external ear normal.      Nose: Nose normal.      Mouth/Throat:      Mouth: Mucous membranes are dry.      Pharynx: Oropharynx is clear.   Eyes:      Extraocular Movements: Extraocular movements intact.      Conjunctiva/sclera: Conjunctivae normal.      Pupils: Pupils are equal, round, and reactive to light.   Cardiovascular:      Rate and Rhythm: Normal rate and regular rhythm.      Pulses: Normal pulses.      Heart sounds: Normal heart sounds.   Pulmonary:      Effort: Pulmonary effort is normal.      Breath sounds: Normal breath sounds.   Musculoskeletal:      Cervical back: Normal range of motion and neck supple.   Feet:      Comments:      Neurological:      Mental Status: He is alert.       Lab Results   Component Value Date    CHLPL 130 2022    TRIG 128 2022    HDL 43 2022    LDL 64 2022    VLDL 23 2022    HGBA1C 6.2 (H) 2022            HEALTH RISK ASSESSMENT    Smoking Status:  Social History     Tobacco Use   Smoking Status Former   • Packs/day: 1.00   • Years: 27.00   • Pack years: 27.00   • Types: Cigarettes, Cigars   • Start date:    • Quit date:    • Years since quittin.9   Smokeless Tobacco Never   Tobacco Comments    1 cigar/day, 2.40 years     Alcohol Consumption:  Social History     Substance and Sexual Activity   Alcohol Use Not Currently    Comment: about every 1-2 years he mighst have a drink     Fall Risk Screen:    STEADI Fall Risk Assessment was completed, and patient is at MODERATE risk for falls. Assessment  completed on:12/21/2022    Depression Screening:  PHQ-2/PHQ-9 Depression Screening 12/21/2022   Little Interest or Pleasure in Doing Things 0-->not at all   Feeling Down, Depressed or Hopeless 0-->not at all   PHQ-9: Brief Depression Severity Measure Score 0       Health Habits and Functional and Cognitive Screening:  Functional & Cognitive Status 12/21/2022   Do you have difficulty preparing food and eating? No   Do you have difficulty bathing yourself, getting dressed or grooming yourself? No   Do you have difficulty using the toilet? No   Do you have difficulty moving around from place to place? No   Do you have trouble with steps or getting out of a bed or a chair? No   Current Diet Well Balanced Diet   Dental Exam Up to date   Eye Exam Up to date   Exercise (times per week) 0 times per week   Current Exercises Include No Regular Exercise   Do you need help using the phone?  No   Are you deaf or do you have serious difficulty hearing?  No   Do you need help with transportation? Yes   Do you need help shopping? No   Do you need help preparing meals?  No   Do you need help with housework?  No   Do you need help with laundry? No   Do you need help taking your medications? No   Do you need help managing money? No   Do you ever drive or ride in a car without wearing a seat belt? No   Have you felt unusual stress, anger or loneliness in the last month? No   Who do you live with? Spouse   If you need help, do you have trouble finding someone available to you? No   Have you been bothered in the last four weeks by sexual problems? No   Do you have difficulty concentrating, remembering or making decisions? No       Age-appropriate Screening Schedule:  Refer to the list below for future screening recommendations based on patient's age, sex and/or medical conditions. Orders for these recommended tests are listed in the plan section. The patient has been provided with a written plan.    Health Maintenance   Topic Date Due    • ZOSTER VACCINE (2 of 2) 09/01/2017   • TDAP/TD VACCINES (2 - Td or Tdap) 11/01/2023   • LIPID PANEL  12/14/2023   • INFLUENZA VACCINE  Completed              Assessment & Plan    Problem List Items Addressed This Visit        Cardiac and Vasculature    Hypertension, essential    Current Assessment & Plan     Discussed with patient to monitor their blood pressure and if systolic blood pressure goes above 140 or diastolic is above 90 to return to clinic.  Take medicines as directed, call for any problems, patient not having or any worrisome symptoms.             Hyperlipidemia, mixed    Current Assessment & Plan     DL 43.  LDL 64.  We will recheck in 6 months and continue medications.            Endocrine and Metabolic    Vitamin B12 deficiency    Current Assessment & Plan     Adequately replaced         Vitamin D deficiency    Current Assessment & Plan     Vitamin D 64.6.  We will follow.         Hyperglycemia    Current Assessment & Plan     A1c 6.2.  We will follow.         Acquired hypothyroidism       Genitourinary and Reproductive     Chronic kidney disease, stage 3a (HCC)    Current Assessment & Plan     GFR 57.Patient is instructed to not take any NSAIDs.  Medicines as directed.  Stay well-hydrated.              Health Encounters    Medicare annual wellness visit, subsequent - Primary       Neuro    Neuropathy    Relevant Medications    gabapentin (NEURONTIN) 600 MG tablet     CMS Preventative Services Quick Reference  Risk Factors Identified During Encounter  Fall Risk-High or Moderate: Discussed Fall Prevention in the home  The above risks/problems have been discussed with the patient.  Follow up actions/plans if indicated are seen below in the Assessment/Plan Section.  Pertinent information has been shared with the patient in the After Visit Summary.    There are no diagnoses linked to this encounter.    Follow Up:   No follow-ups on file.     An After Visit Summary and PPPS were made available to the  patient.          I spent 15 minutes caring for Kurt on this date of service. This time includes time spent by me in the following activities:preparing for the visit, reviewing tests, obtaining and/or reviewing a separately obtained history, performing a medically appropriate examination and/or evaluation , counseling and educating the patient/family/caregiver, ordering medications, tests, or procedures, referring and communicating with other health care professionals , documenting information in the medical record, independently interpreting results and communicating that information with the patient/family/caregiver and care coordination

## 2022-12-21 NOTE — TELEPHONE ENCOUNTER
Rx Refill Note    Requested Prescriptions     Pending Prescriptions Disp Refills   • lisinopril (PRINIVIL,ZESTRIL) 40 MG tablet [Pharmacy Med Name: Lisinopril 40 MG Oral Tablet] 90 tablet 0     Sig: Take 1 tablet by mouth once daily        Last office visit with prescribing clinician: 9/21/2022      Next office visit with prescribing clinician: 12/21/2022   Last labs:   Last refill: 03/28/2022   Pharmacy (be sure to add in Epic). correct

## 2023-01-04 ENCOUNTER — ANTICOAGULATION VISIT (OUTPATIENT)
Dept: PHARMACY | Facility: HOSPITAL | Age: 83
End: 2023-01-04
Payer: MEDICARE

## 2023-01-04 ENCOUNTER — CLINICAL SUPPORT (OUTPATIENT)
Dept: CARDIOLOGY | Facility: CLINIC | Age: 83
End: 2023-01-04
Payer: MEDICARE

## 2023-01-04 DIAGNOSIS — I48.0 PAF (PAROXYSMAL ATRIAL FIBRILLATION): Primary | ICD-10-CM

## 2023-01-04 LAB — INR PPP: 3 (ref 0.9–1.1)

## 2023-01-04 NOTE — PROGRESS NOTES
Anticoagulation Clinic - Remote Progress Note  Remote Lab    Indication: paroxysmal afib  Referring Provider: Buddy Javier  Initial Warfarin Start Date:   Goal INR: 2.0-3.0  Current Drug Interactions: omega three fatty acid; CoQ-10   [id number]: 3 (HTN, Age)    Diet: ~ 2 Tablespoon QOD mixed cooked greens; green beans 2x weekly 7/26/22  Alcohol: None  Tobacco: None  OTC Pain Medication: APAP PRN    INR History:  Date 3/23 3/30 4/8 4/16 4/23 5/7 6/4 6/18 7/2 7/19 7/28 8/11   Total Weekly Dose 27.5mg 25mg 25mg 25mg 25mg 25mg 25mg 25mg 25mg 25mg 25mg 25mg   INR 3.2 2.7 3.2 2.0 2.4 2.7 1.8 1.8 2.3 1.9 1.9 2.6   Notes   LD doxy 4/8    incr GLV  decr GLV incr GLV  incr GLV decr GLV     Date 8/25 9/22 10/20 11/18 11/29 12/28  3/2/22 3/1/ 4/5/22 4/11 4/27   Total WeeklyDose 25mg 25mg 25mg 25mg 25mg 25mg non- compliant 25mg 25 mg 25 mg 25mg 25 mg   INR 2.1 2.8 2.8 3.1 2.4 2.0  2.9 3.2 3.5 2.5 2.8   Notes    decr GLV    APAP  decr GLV       Date 5/31 6/28 7/26 8/25 9/8 10/6 10/20 11/10 11/30 1/4    Total Weekly Dose 25 mg 25 mg 25 mg 25 mg 25 mg 25 mg 25mg 25mg 25 mg 25 mg    INR 2.9 2.8 2.6 3.5 2.9 3.5 2.5 2.6 2.7 3.0    Notes                Phone Interview:  Tablet Strength: 5mg  Patient Contact Info: [phone] (Mobile)  Estimated OOP cost: will send if patient comes to [place]   Verbal Release Auth:   Lab Contact Info: [facility] POCT    Patient Findings    Positives:  Change in medications   Negatives:  Signs/symptoms of thrombosis, Signs/symptoms of bleeding, Laboratory test error suspected, Change in health, Change in alcohol use, Change in activity, Upcoming invasive procedure, Emergency department visit, Upcoming dental procedure, Missed doses, Extra doses, Change in diet/appetite, Hospital admission, Bruising, Other complaints   Comments:  VitD3 5000 mg qd & magnesium 250 mg qd; no DDIs found. All other findings negative.      Plan:  1. INR is therapeutic today at 3.0 (goal 2.0-3.0). Instructed   Mik to continue maintenance dose of warfarin 2.5 mg oral daily except 5 mg on MonWedSat until recheck (25 mg/week)   2. Repeat INR in 4 weeks, 2/1/23.  3. Verbal information provided over the phone. Kurt Houser RBV dosing instructions, expresses understanding by teach back, and has no further questions at this time.    Pete Dickinson, Evgeny  1/5/2023  10:14 EST       I, Regine Adame, PharmD, have reviewed the note in full and agree with the assessment and plan.  01/05/23  11:49 EST

## 2023-01-18 RX ORDER — WARFARIN SODIUM 5 MG/1
TABLET ORAL
Qty: 90 TABLET | Refills: 0 | Status: SHIPPED | OUTPATIENT
Start: 2023-01-18

## 2023-01-19 RX ORDER — LEVOTHYROXINE SODIUM 0.03 MG/1
TABLET ORAL
Qty: 90 TABLET | Refills: 1 | Status: SHIPPED | OUTPATIENT
Start: 2023-01-19 | End: 2023-03-16 | Stop reason: SDUPTHER

## 2023-01-19 NOTE — TELEPHONE ENCOUNTER
Rx Refill Note    Requested Prescriptions     Pending Prescriptions Disp Refills   • levothyroxine (SYNTHROID, LEVOTHROID) 25 MCG tablet [Pharmacy Med Name: Levothyroxine Sodium 25 MCG Oral Tablet] 90 tablet 0     Sig: Take 1 tablet by mouth once daily        Last office visit with prescribing clinician: 12/21/2022      Next office visit with prescribing clinician: 3/16/2023   Last labs:   Last refill: n/a   Pharmacy (be sure to add in Epic). correct

## 2023-02-02 ENCOUNTER — CLINICAL SUPPORT (OUTPATIENT)
Dept: CARDIOLOGY | Facility: CLINIC | Age: 83
End: 2023-02-02
Payer: MEDICARE

## 2023-02-02 ENCOUNTER — ANTICOAGULATION VISIT (OUTPATIENT)
Dept: PHARMACY | Facility: HOSPITAL | Age: 83
End: 2023-02-02
Payer: MEDICARE

## 2023-02-02 DIAGNOSIS — I48.0 PAF (PAROXYSMAL ATRIAL FIBRILLATION): Primary | ICD-10-CM

## 2023-02-02 LAB — INR PPP: 3.6 (ref 0.9–1.1)

## 2023-02-02 NOTE — PROGRESS NOTES
Anticoagulation Clinic - Remote Progress Note  Remote Lab    Indication: paroxysmal afib  Referring Provider: Buddy Javier  Initial Warfarin Start Date:   Goal INR: 2.0-3.0  Current Drug Interactions: omega three fatty acid; CoQ-10   [id number]: 3 (HTN, Age)    Diet: ~ 2 Tablespoon QOD mixed cooked greens; green beans 2x weekly 7/26/22  Alcohol: None  Tobacco: None  OTC Pain Medication: APAP PRN    INR History:  Date 3/23 3/30 4/8 4/16 4/23 5/7 6/4 6/18 7/2 7/19 7/28 8/11   Total Weekly Dose 27.5mg 25mg 25mg 25mg 25mg 25mg 25mg 25mg 25mg 25mg 25mg 25mg   INR 3.2 2.7 3.2 2.0 2.4 2.7 1.8 1.8 2.3 1.9 1.9 2.6   Notes   LD doxy 4/8    incr GLV  decr GLV incr GLV  incr GLV decr GLV     Date 8/25 9/22 10/20 11/18 11/29 12/28  3/2/22 3/1/ 4/5/22 4/11 4/27   Total WeeklyDose 25mg 25mg 25mg 25mg 25mg 25mg non- compliant 25mg 25 mg 25 mg 25mg 25 mg   INR 2.1 2.8 2.8 3.1 2.4 2.0  2.9 3.2 3.5 2.5 2.8   Notes    decr GLV    APAP  decr GLV       Date 5/31 6/28 7/26 8/25 9/8 10/6 10/20 11/10 11/30 1/4 2/2   Total Weekly Dose 25 mg 25 mg 25 mg 25 mg 25 mg 25 mg 25mg 25mg 25 mg 25 mg 25mg   INR 2.9 2.8 2.6 3.5 2.9 3.5 2.5 2.6 2.7 3.0 3.6   Notes                Phone Interview:  Tablet Strength: 5mg  Patient Contact Info: [phone] (Mobile)  Estimated OOP cost: will send if patient comes to [place]   Verbal Release Auth:   Lab Contact Info: [facility] POCT    Patient Findings      Negatives:  Signs/symptoms of thrombosis, Signs/symptoms of bleeding, Laboratory test error suspected, Change in health, Change in alcohol use, Change in activity, Upcoming invasive procedure, Emergency department visit, Upcoming dental procedure, Missed doses, Extra doses, Change in medications, Change in diet/appetite, Hospital admission, Bruising, Other complaints   Comments:  Held last night's dose dose. Plans to increase his GLV to keep INR WNL           Plan:  1. INR is SUPRAtherapeutic today at 3.6  (goal 2.0-3.0). considering patient  held last night's dose,  Instructed Mr. Houser to continue maintenance dose of warfarin 2.5 mg oral daily except 5 mg on MonWedSat until recheck (25 mg/week)   2. Repeat INR in 2 weeks  3. Verbal information provided over the phone. Kurt Houser RBV dosing instructions, expresses understanding by teach back, and has no further questions at this time.    Regine Adame, PharmD.  02/03/23   11:02 EST

## 2023-02-17 ENCOUNTER — ANTICOAGULATION VISIT (OUTPATIENT)
Dept: PHARMACY | Facility: HOSPITAL | Age: 83
End: 2023-02-17
Payer: MEDICARE

## 2023-02-17 ENCOUNTER — CLINICAL SUPPORT (OUTPATIENT)
Dept: CARDIOLOGY | Facility: CLINIC | Age: 83
End: 2023-02-17
Payer: MEDICARE

## 2023-02-17 DIAGNOSIS — I48.0 PAF (PAROXYSMAL ATRIAL FIBRILLATION): Primary | ICD-10-CM

## 2023-02-17 LAB — INR PPP: 2.7 (ref 0.9–1.1)

## 2023-02-17 NOTE — PROGRESS NOTES
Anticoagulation Clinic - Remote Progress Note  Remote Lab    Indication: paroxysmal afib  Referring Provider: Buddy Javier  Initial Warfarin Start Date:   Goal INR: 2.0-3.0  Current Drug Interactions: omega three fatty acid; CoQ-10   [id number]: 3 (HTN, Age)    Diet: ~ 2 Tablespoon QOD mixed cooked greens; green beans 2x weekly 2/20/2023  Alcohol: None  Tobacco: None  OTC Pain Medication: APAP PRN    INR History:  Date 3/23 3/30 4/8 4/16 4/23 5/7 6/4 6/18 7/2 7/19 7/28 8/11   Total Weekly Dose 27.5mg 25mg 25mg 25mg 25mg 25mg 25mg 25mg 25mg 25mg 25mg 25mg   INR 3.2 2.7 3.2 2.0 2.4 2.7 1.8 1.8 2.3 1.9 1.9 2.6   Notes   LD doxy 4/8    incr GLV  decr GLV incr GLV  incr GLV decr GLV     Date 8/25 9/22 10/20 11/18 11/29 12/28  3/2/22 3/1/ 4/5/22 4/11 4/27   Total WeeklyDose 25mg 25mg 25mg 25mg 25mg 25mg non- compliant 25mg 25 mg 25 mg 25mg 25 mg   INR 2.1 2.8 2.8 3.1 2.4 2.0  2.9 3.2 3.5 2.5 2.8   Notes    decr GLV    APAP  decr GLV       Date 5/31 6/28 7/26 8/25 9/8 10/6 10/20 11/10 11/30 1/4 2/2   Total Weekly Dose 25 mg 25 mg 25 mg 25 mg 25 mg 25 mg 25mg 25mg 25 mg 25 mg 25mg   INR 2.9 2.8 2.6 3.5 2.9 3.5 2.5 2.6 2.7 3.0 3.6   Notes                Date 2/17             Total Weekly Dose 25mg             INR 2.7             Notes                  Phone Interview:  Tablet Strength: 5mg  Patient Contact Info: [phone] (Mobile)  Estimated OOP cost: will send if patient comes to [place]   Verbal Release Auth:   Lab Contact Info: [facility] POCT    Patient Findings  Negatives:  Signs/symptoms of thrombosis, Signs/symptoms of bleeding, Laboratory test error suspected, Change in health, Change in alcohol use, Change in activity, Upcoming invasive procedure, Emergency department visit, Upcoming dental procedure, Missed doses, Extra doses, Change in medications, Change in diet/appetite, Hospital admission, Bruising, Other complaints     Plan:  1. INR is therapeutic today at 2.7  (goal 2.0-3.0).  Instructed   Mik to continue maintenance dose of warfarin 2.5 mg oral daily except 5 mg on MonWedSat until recheck (25 mg/week)   2. Repeat INR in 4 weeks. Reminded of PCP appointment.  3. Verbal information provided over the phone. Kurt Houser RBV dosing instructions, expresses understanding by teach back, and has no further questions at this time.    Laura Ames, PharmD  02/17/23   14:57 EST

## 2023-03-15 RX ORDER — MELOXICAM 15 MG/1
TABLET ORAL
Qty: 90 TABLET | Refills: 0 | OUTPATIENT
Start: 2023-03-15

## 2023-03-15 RX ORDER — LISINOPRIL 40 MG/1
TABLET ORAL
Qty: 90 TABLET | Refills: 0 | Status: SHIPPED | OUTPATIENT
Start: 2023-03-15 | End: 2023-03-16 | Stop reason: SDUPTHER

## 2023-03-15 NOTE — TELEPHONE ENCOUNTER
Rx Refill Note    Requested Prescriptions     Pending Prescriptions Disp Refills   • lisinopril (PRINIVIL,ZESTRIL) 40 MG tablet [Pharmacy Med Name: Lisinopril 40 MG Oral Tablet] 90 tablet 0     Sig: Take 1 tablet by mouth once daily   • meloxicam (MOBIC) 15 MG tablet [Pharmacy Med Name: Meloxicam 15 MG Oral Tablet] 90 tablet 0     Sig: Take 1 tablet by mouth once daily        Last office visit with prescribing clinician: 12/21/2022      Next office visit with prescribing clinician: 3/16/2023   Last labs:   Last refill:    Pharmacy (be sure to add in Epic). correct

## 2023-03-16 ENCOUNTER — ANTICOAGULATION VISIT (OUTPATIENT)
Dept: PHARMACY | Facility: HOSPITAL | Age: 83
End: 2023-03-16
Payer: MEDICARE

## 2023-03-16 ENCOUNTER — TELEPHONE (OUTPATIENT)
Dept: FAMILY MEDICINE CLINIC | Facility: CLINIC | Age: 83
End: 2023-03-16

## 2023-03-16 ENCOUNTER — OFFICE VISIT (OUTPATIENT)
Dept: FAMILY MEDICINE CLINIC | Facility: CLINIC | Age: 83
End: 2023-03-16
Payer: MEDICARE

## 2023-03-16 ENCOUNTER — CLINICAL SUPPORT (OUTPATIENT)
Dept: CARDIOLOGY | Facility: CLINIC | Age: 83
End: 2023-03-16
Payer: MEDICARE

## 2023-03-16 VITALS
SYSTOLIC BLOOD PRESSURE: 110 MMHG | RESPIRATION RATE: 15 BRPM | HEIGHT: 74 IN | DIASTOLIC BLOOD PRESSURE: 80 MMHG | WEIGHT: 223.8 LBS | TEMPERATURE: 98.1 F | BODY MASS INDEX: 28.72 KG/M2 | HEART RATE: 77 BPM | OXYGEN SATURATION: 97 %

## 2023-03-16 DIAGNOSIS — I10 HYPERTENSION, ESSENTIAL: ICD-10-CM

## 2023-03-16 DIAGNOSIS — I48.0 PAF (PAROXYSMAL ATRIAL FIBRILLATION): Primary | ICD-10-CM

## 2023-03-16 DIAGNOSIS — E55.9 VITAMIN D DEFICIENCY: ICD-10-CM

## 2023-03-16 DIAGNOSIS — E53.8 VITAMIN B12 DEFICIENCY: ICD-10-CM

## 2023-03-16 DIAGNOSIS — E78.2 HYPERLIPIDEMIA, MIXED: ICD-10-CM

## 2023-03-16 DIAGNOSIS — E66.01 MORBID (SEVERE) OBESITY DUE TO EXCESS CALORIES: ICD-10-CM

## 2023-03-16 DIAGNOSIS — G62.9 NEUROPATHY: Primary | ICD-10-CM

## 2023-03-16 DIAGNOSIS — R73.9 HYPERGLYCEMIA: ICD-10-CM

## 2023-03-16 DIAGNOSIS — Z79.899 ENCOUNTER FOR LONG-TERM (CURRENT) USE OF OTHER MEDICATIONS: ICD-10-CM

## 2023-03-16 LAB
INR PPP: 3.3 (ref 0.9–1.1)
POC AMPHETAMINES: NEGATIVE
POC BARBITURATES: NEGATIVE
POC BENZODIAZEPHINES: NEGATIVE
POC COCAINE: NEGATIVE
POC METHADONE: NEGATIVE
POC METHAMPHETAMINE SCREEN URINE: NEGATIVE
POC OPIATES: NEGATIVE
POC OXYCODONE: NEGATIVE
POC PHENCYCLIDINE: NEGATIVE
POC PROPOXYPHENE: NEGATIVE
POC THC: NEGATIVE
POC TRICYCLIC ANTIDEPRESSANTS: NEGATIVE

## 2023-03-16 PROCEDURE — 99214 OFFICE O/P EST MOD 30 MIN: CPT | Performed by: FAMILY MEDICINE

## 2023-03-16 PROCEDURE — 3079F DIAST BP 80-89 MM HG: CPT | Performed by: FAMILY MEDICINE

## 2023-03-16 PROCEDURE — 3074F SYST BP LT 130 MM HG: CPT | Performed by: FAMILY MEDICINE

## 2023-03-16 PROCEDURE — 80305 DRUG TEST PRSMV DIR OPT OBS: CPT | Performed by: FAMILY MEDICINE

## 2023-03-16 RX ORDER — LEVOTHYROXINE SODIUM 0.03 MG/1
25 TABLET ORAL DAILY
Qty: 90 TABLET | Refills: 1 | Status: SHIPPED | OUTPATIENT
Start: 2023-03-16

## 2023-03-16 RX ORDER — CHOLECALCIFEROL (VITAMIN D3) 25 MCG
1000 TABLET,CHEWABLE ORAL DAILY
Qty: 90 CAPSULE | Refills: 1 | Status: SHIPPED | OUTPATIENT
Start: 2023-03-16

## 2023-03-16 RX ORDER — ROSUVASTATIN CALCIUM 20 MG/1
20 TABLET, COATED ORAL DAILY
Qty: 90 TABLET | Refills: 1 | Status: SHIPPED | OUTPATIENT
Start: 2023-03-16

## 2023-03-16 RX ORDER — LISINOPRIL 40 MG/1
40 TABLET ORAL DAILY
Qty: 90 TABLET | Refills: 1 | Status: SHIPPED | OUTPATIENT
Start: 2023-03-16

## 2023-03-16 RX ORDER — MELOXICAM 15 MG/1
15 TABLET ORAL AS NEEDED
Qty: 30 TABLET | Refills: 1 | Status: SHIPPED | OUTPATIENT
Start: 2023-03-16

## 2023-03-16 RX ORDER — GABAPENTIN 600 MG/1
1200 TABLET ORAL 3 TIMES DAILY
Qty: 180 TABLET | Refills: 2 | Status: SHIPPED | OUTPATIENT
Start: 2023-03-16

## 2023-03-16 RX ORDER — AMLODIPINE BESYLATE 5 MG/1
5 TABLET ORAL DAILY
Qty: 90 TABLET | Refills: 1 | Status: SHIPPED | OUTPATIENT
Start: 2023-03-16

## 2023-03-16 NOTE — PROGRESS NOTES
Anticoagulation Clinic - Remote Progress Note  Remote Lab    Indication: paroxysmal afib  Referring Provider: Buddy Javier  Initial Warfarin Start Date:   Goal INR: 2.0-3.0  Current Drug Interactions: omega three fatty acid; CoQ-10   [id number]: 3 (HTN, Age)    Diet: ~ 2 Tablespoon QOD mixed cooked greens; green beans 2x weekly 2/20/2023  Alcohol: None  Tobacco: None  OTC Pain Medication: APAP PRN    INR History:  Date 3/23 3/30 4/8 4/16 4/23 5/7 6/4 6/18 7/2 7/19 7/28 8/11   Total Weekly Dose 27.5mg 25mg 25mg 25mg 25mg 25mg 25mg 25mg 25mg 25mg 25mg 25mg   INR 3.2 2.7 3.2 2.0 2.4 2.7 1.8 1.8 2.3 1.9 1.9 2.6   Notes   LD doxy 4/8    incr GLV  decr GLV incr GLV  incr GLV decr GLV     Date 8/25 9/22 10/20 11/18 11/29 12/28  3/2/22 3/1/ 4/5/22 4/11 4/27   Total WeeklyDose 25mg 25mg 25mg 25mg 25mg 25mg non- compliant 25mg 25 mg 25 mg 25mg 25 mg   INR 2.1 2.8 2.8 3.1 2.4 2.0  2.9 3.2 3.5 2.5 2.8   Notes    decr GLV    APAP  decr GLV       Date 5/31 6/28 7/26 8/25 9/8 10/6 10/20 11/10 11/30 1/4 2/2   Total Weekly Dose 25 mg 25 mg 25 mg 25 mg 25 mg 25 mg 25mg 25mg 25 mg 25 mg 25mg   INR 2.9 2.8 2.6 3.5 2.9 3.5 2.5 2.6 2.7 3.0 3.6   Notes                Date 2/17  3/16            Total Weekly Dose 25mg  25 mg            INR 2.7 3.3            Notes                Phone Interview:  Tablet Strength: 5mg; 2.5mg  Patient Contact Info: [phone] (Mobile)  Estimated OOP cost: will send if patient comes to [place]   Verbal Release Auth:   Lab Contact Info: [facility] POCT    Patient Findings  Negatives:  Signs/symptoms of thrombosis, Signs/symptoms of bleeding, Laboratory test error suspected, Change in health, Change in alcohol use, Change in activity, Upcoming invasive procedure, Emergency department visit, Upcoming dental procedure, Missed doses, Extra doses, Change in medications, Change in diet/appetite, Hospital admission, Bruising, Other complaints   Comments:  PCP follow up today; no changes noted      Plan:  1. INR is SUPRA therapeutic today at 3.3 (goal 2.0-3.0).  Instructed Mr. Houser to eat an extra serving of cooked greens tonight and then continue maintenance dose of warfarin 2.5 mg oral daily except 5 mg on MonWedSat until recheck (25 mg/week)   2. Repeat INR in 2 weeks.  3. Patient requests 2.5mg instead of 5mg tablets  4. Verbal information provided over the phone. Kurt Houser RBV dosing instructions, expresses understanding by teach back, and has no further questions at this time.    Amisha Santana, PharmD  03/16/23   14:20 EDT

## 2023-03-16 NOTE — TELEPHONE ENCOUNTER
Caller: [facility] [address] [place] [address] JAMAAL [facility] [phone] [facility] [phone]     Relationship: Pharmacy      What medications are you currently taking:   Current Outpatient Medications on File Prior to Visit   Medication Sig Dispense Refill   • amLODIPine (NORVASC) 5 MG tablet Take 1 tablet by mouth Daily. 90 tablet 1   • Cholecalciferol (Vitamin D3) 1.25 MG (24755 UT) tablet Take 1 tablet by mouth Daily. 90 tablet 1   • Coenzyme Q10 (CoQ-10) 100 MG capsule Take  by mouth Daily.     • Cyanocobalamin (B-12) 1000 MCG capsule Take 1,000 mg by mouth Daily. 90 capsule 1   • gabapentin (NEURONTIN) 600 MG tablet Take 2 tablets by mouth 3 (Three) Times a Day. 180 tablet 2   • levothyroxine (SYNTHROID, LEVOTHROID) 25 MCG tablet Take 1 tablet by mouth Daily. 90 tablet 1   • lisinopril (PRINIVIL,ZESTRIL) 40 MG tablet Take 1 tablet by mouth Daily. 90 tablet 1   • meloxicam (MOBIC) 15 MG tablet Take 1 tablet by mouth As Needed for Moderate Pain. 30 tablet 1   • Omega-3 Fatty Acids (fish oil) 1000 MG capsule capsule Take  by mouth Daily With Breakfast.     • rosuvastatin (CRESTOR) 20 MG tablet Take 1 tablet by mouth Daily. 90 tablet 1   • warfarin (COUMADIN) 5 MG tablet Take 1 tablet by mouth once daily 90 tablet 0   • [DISCONTINUED] amLODIPine (NORVASC) 5 MG tablet Take 1 tablet by mouth Daily. 90 tablet 1   • [DISCONTINUED] Cholecalciferol (Vitamin D3) 1.25 MG (61479 UT) tablet Take  by mouth.     • [DISCONTINUED] cholecalciferol (VITAMIN D3) 25 MCG (1000 UT) tablet Take  by mouth. As directed     • [DISCONTINUED] Cyanocobalamin (B-12) 1000 MCG capsule Take  by mouth.     • [DISCONTINUED] gabapentin (NEURONTIN) 600 MG tablet Take 2 tablets by mouth 3 (Three) Times a Day. 180 tablet 2   • [DISCONTINUED] levothyroxine (SYNTHROID, LEVOTHROID) 25 MCG tablet Take 1 tablet by mouth once daily 90 tablet 1   • [DISCONTINUED] lisinopril (PRINIVIL,ZESTRIL) 40 MG tablet Take 1 tablet by mouth once daily 90  tablet 0   • [DISCONTINUED] meloxicam (MOBIC) 15 MG tablet Take 1 tablet by mouth once daily 30 tablet 0   • [DISCONTINUED] rosuvastatin (CRESTOR) 20 MG tablet Take 1 tablet by mouth Daily. 90 tablet 3     No current facility-administered medications on file prior to visit.      What are your concerns: CALLED STATED THAT RX   Cholecalciferol (Vitamin D3) 1.25 MG (81827 UT) tablet      STRENGTH IS USUALLY GIVEN ONCE A WEEK NOT DAY, WANTED TO VERIFY

## 2023-03-16 NOTE — PROGRESS NOTES
Patient Name: Kurt Houser  : 1940   MRN: [id number]     Chief Complaint:    Chief Complaint   Patient presents with   • Med Refill     Pt here for medication refills and recheck       History of Present Illness: Kurt Houser is a 82 y.o. male who is here today for follow up on Neuropathy  HPI        Review of Systems:   Review of Systems   Constitutional: Negative.    HENT: Negative.    Eyes: Negative.    Respiratory: Negative.    Cardiovascular: Negative.    Gastrointestinal: Negative.    Neurological: Negative.         Past Medical History:   Past Medical History:   Diagnosis Date   • Acute osteomyelitis of foot (HCC)    • Amputated toe of left foot (HCC)    • Anemia of decreased vitamin B12 absorption    • BMI 27.0-27.9,adult    • Degenerative joint disease involving multiple joints    • Diverticular disease of colon    • Drug therapy    • Dyslipidemia    • Essential hypertension    • Foot osteomyelitis (HCC)    • Frequent PVCs    • Glaucoma    • High risk medication use    • History of adenomatous polyp of colon    • Long term current use of anticoagulant    • Low density lipoprotein (LDL) cholesterol level below 100mg/dL    • Mixed hyperlipidemia    • Neuropathy    • Osteoarthritis    • Paroxysmal atrial fibrillation (HCC)    • Polyneuropathy    • Prediabetes    • Ventricular premature beats    • Vitamin B12 deficiency    • Vitamin D deficiency        Past Surgical History:   Past Surgical History:   Procedure Laterality Date   • CATARACT EXTRACTION, BILATERAL     • FOOT SURGERY Left    • TRANS METATARSAL AMPUTATION  2020    5th metatarsal amputation/IV abx   • WRIST SURGERY Left        Family History:   Family History   Problem Relation Age of Onset   • Colon cancer Father    • Liver cancer Brother        Social History:   Social History     Socioeconomic History   • Marital status:    Tobacco Use   • Smoking status: Former     Packs/day: 1.00     Years: 27.00     Pack years:  27.00     Types: Cigarettes, Cigars     Start date:      Quit date: 2000     Years since quittin.2   • Smokeless tobacco: Never   • Tobacco comments:     1 cigar/day, 2.40 years   Vaping Use   • Vaping Use: Never used   Substance and Sexual Activity   • Alcohol use: Not Currently     Comment: about every 1-2 years he jania have a drink   • Drug use: Never   • Sexual activity: Defer       Medications:     Current Outpatient Medications:   •  amLODIPine (NORVASC) 5 MG tablet, Take 1 tablet by mouth Daily., Disp: 90 tablet, Rfl: 1  •  Cholecalciferol (Vitamin D3) 1.25 MG (87150 UT) tablet, Take 1 tablet by mouth Daily., Disp: 90 tablet, Rfl: 1  •  Coenzyme Q10 (CoQ-10) 100 MG capsule, Take  by mouth Daily., Disp: , Rfl:   •  Cyanocobalamin (B-12) 1000 MCG capsule, Take 1,000 mg by mouth Daily., Disp: 90 capsule, Rfl: 1  •  gabapentin (NEURONTIN) 600 MG tablet, Take 2 tablets by mouth 3 (Three) Times a Day., Disp: 180 tablet, Rfl: 2  •  levothyroxine (SYNTHROID, LEVOTHROID) 25 MCG tablet, Take 1 tablet by mouth Daily., Disp: 90 tablet, Rfl: 1  •  lisinopril (PRINIVIL,ZESTRIL) 40 MG tablet, Take 1 tablet by mouth Daily., Disp: 90 tablet, Rfl: 1  •  meloxicam (MOBIC) 15 MG tablet, Take 1 tablet by mouth As Needed for Moderate Pain., Disp: 30 tablet, Rfl: 1  •  Omega-3 Fatty Acids (fish oil) 1000 MG capsule capsule, Take  by mouth Daily With Breakfast., Disp: , Rfl:   •  rosuvastatin (CRESTOR) 20 MG tablet, Take 1 tablet by mouth Daily., Disp: 90 tablet, Rfl: 1  •  warfarin (COUMADIN) 5 MG tablet, Take 1 tablet by mouth once daily, Disp: 90 tablet, Rfl: 0    Allergies:   Allergies   Allergen Reactions   • Acetaminophen Mental Status Change   • Hydrocodone Bitart (Antituss) [Hydrocodone] Mental Status Change         Physical Exam:  Vital Signs:   Vitals:    23 1301   BP: 110/80   BP Location: Left arm   Patient Position: Sitting   Cuff Size: Adult   Pulse: 77   Resp: 15   Temp: 98.1 °F (36.7 °C)   TempSrc:  "Temporal   SpO2: 97%   Weight: 102 kg (223 lb 12.8 oz)   Height: 188 cm (74\")   PainSc: 0-No pain     Body mass index is 28.73 kg/m².     Physical Exam  Vitals and nursing note reviewed.   Constitutional:       Appearance: Normal appearance. He is normal weight.   HENT:      Head: Normocephalic and atraumatic.      Right Ear: Tympanic membrane, ear canal and external ear normal.      Left Ear: Tympanic membrane, ear canal and external ear normal.      Nose: Nose normal.      Mouth/Throat:      Mouth: Mucous membranes are dry.      Pharynx: Oropharynx is clear.   Eyes:      Extraocular Movements: Extraocular movements intact.      Conjunctiva/sclera: Conjunctivae normal.      Pupils: Pupils are equal, round, and reactive to light.   Cardiovascular:      Rate and Rhythm: Normal rate and regular rhythm.      Pulses: Normal pulses.      Heart sounds: Normal heart sounds.   Pulmonary:      Effort: Pulmonary effort is normal.      Breath sounds: Normal breath sounds.   Musculoskeletal:      Cervical back: Normal range of motion and neck supple.   Feet:      Comments:      Neurological:      Mental Status: He is alert.         Procedures      Assessment/Plan:   Diagnoses and all orders for this visit:    1. Neuropathy (Primary)  Assessment & Plan:  Refill gabapentin.    Orders:  -     gabapentin (NEURONTIN) 600 MG tablet; Take 2 tablets by mouth 3 (Three) Times a Day.  Dispense: 180 tablet; Refill: 2    2. Hypertension, essential  Assessment & Plan:  Discussed with patient to monitor their blood pressure and if systolic blood pressure goes above 140 or diastolic is above 90 to return to clinic.  Take medicines as directed, call for any problems, patient not having or any worrisome symptoms.          3. Hyperlipidemia, mixed  Assessment & Plan:  Blood work in 3 months      4. Vitamin D deficiency  Assessment & Plan:  Blood work in 3 months      5. Vitamin B12 deficiency  Assessment & Plan:  Blood work in 3 months      6. " Hyperglycemia  Assessment & Plan:  Blood work in 3 months      7. Morbid (severe) obesity due to excess calories (HCC)  Assessment & Plan:  Discussed diet and exercise      8. Encounter for long-term (current) use of other medications  -     POC Urine Drug Screen, Triage    Other orders  -     amLODIPine (NORVASC) 5 MG tablet; Take 1 tablet by mouth Daily.  Dispense: 90 tablet; Refill: 1  -     Cyanocobalamin (B-12) 1000 MCG capsule; Take 1,000 mg by mouth Daily.  Dispense: 90 capsule; Refill: 1  -     Cholecalciferol (Vitamin D3) 1.25 MG (74178 UT) tablet; Take 1 tablet by mouth Daily.  Dispense: 90 tablet; Refill: 1  -     levothyroxine (SYNTHROID, LEVOTHROID) 25 MCG tablet; Take 1 tablet by mouth Daily.  Dispense: 90 tablet; Refill: 1  -     lisinopril (PRINIVIL,ZESTRIL) 40 MG tablet; Take 1 tablet by mouth Daily.  Dispense: 90 tablet; Refill: 1  -     meloxicam (MOBIC) 15 MG tablet; Take 1 tablet by mouth As Needed for Moderate Pain.  Dispense: 30 tablet; Refill: 1  -     rosuvastatin (CRESTOR) 20 MG tablet; Take 1 tablet by mouth Daily.  Dispense: 90 tablet; Refill: 1           Follow Up:   No follow-ups on file.    Leo Cordova MD  [facility]

## 2023-03-17 RX ORDER — WARFARIN SODIUM 2.5 MG/1
TABLET ORAL
Qty: 45 TABLET | Refills: 1 | Status: SHIPPED | OUTPATIENT
Start: 2023-03-17

## 2023-03-17 NOTE — TELEPHONE ENCOUNTER
[facility], pt is on 1.25 mg 5000 units once a day. Julien HAYS

## 2023-04-17 ENCOUNTER — ANTICOAGULATION VISIT (OUTPATIENT)
Dept: PHARMACY | Facility: HOSPITAL | Age: 83
End: 2023-04-17
Payer: MEDICARE

## 2023-04-17 ENCOUNTER — CLINICAL SUPPORT (OUTPATIENT)
Dept: CARDIOLOGY | Facility: CLINIC | Age: 83
End: 2023-04-17
Payer: MEDICARE

## 2023-04-17 DIAGNOSIS — I48.0 PAF (PAROXYSMAL ATRIAL FIBRILLATION): Primary | ICD-10-CM

## 2023-04-17 LAB — INR PPP: 3.5 (ref 0.9–1.1)

## 2023-04-17 NOTE — PROGRESS NOTES
Capillary Blood Specimen Collection  Capillary blood collection performed in clinic by Jossie Naqvi RN. Patient tolerated the procedure well without complications.   04/17/23   Jossie Naqvi RN

## 2023-04-17 NOTE — PROGRESS NOTES
Anticoagulation Clinic - Remote Progress Note  Remote Lab    Indication: paroxysmal afib  Referring Provider: Buddy Jvaier  Initial Warfarin Start Date:   Goal INR: 2.0-3.0  Current Drug Interactions: omega three fatty acid; CoQ-10   [id number]: 3 (HTN, Age)    Diet: ~ 2 Tablespoon QOD mixed cooked greens; green beans 2x weekly 2/20/2023  Alcohol: None  Tobacco: None  OTC Pain Medication: APAP PRN    INR History:  Date 3/23 3/30 4/8 4/16 4/23 5/7 6/4 6/18 7/2 7/19 7/28 8/11   Total Weekly Dose 27.5mg 25mg 25mg 25mg 25mg 25mg 25mg 25mg 25mg 25mg 25mg 25mg   INR 3.2 2.7 3.2 2.0 2.4 2.7 1.8 1.8 2.3 1.9 1.9 2.6   Notes   LD doxy 4/8    incr GLV  decr GLV incr GLV  incr GLV decr GLV     Date 8/25 9/22 10/20 11/18 11/29 12/28  3/2/22 3/1/ 4/5/22 4/11 4/27   Total WeeklyDose 25mg 25mg 25mg 25mg 25mg 25mg non- compliant 25mg 25 mg 25 mg 25mg 25 mg   INR 2.1 2.8 2.8 3.1 2.4 2.0  2.9 3.2 3.5 2.5 2.8   Notes    decr GLV    APAP  decr GLV       Date 5/31 6/28 7/26 8/25 9/8 10/6 10/20 11/10 11/30 1/4 2/2   Total Weekly Dose 25 mg 25 mg 25 mg 25 mg 25 mg 25 mg 25mg 25mg 25 mg 25 mg 25mg   INR 2.9 2.8 2.6 3.5 2.9 3.5 2.5 2.6 2.7 3.0 3.6   Notes                Date 2/17  3/16 4/17           Total Weekly Dose 25mg  25 mg 25 mg            INR 2.7 3.3 3.5           Notes                Phone Interview:  Tablet Strength: 5mg; 2.5mg  Patient Contact Info: [phone] (Mobile)  Estimated OOP cost: will send if patient comes to [place]   Verbal Release Auth:   Lab Contact Info: [facility] PO[facility]    Patient Findings    Positives:  Missed doses, Change in diet/appetite   Negatives:  Signs/symptoms of thrombosis, Signs/symptoms of bleeding, Laboratory test error suspected, Change in health, Change in alcohol use, Change in activity, Upcoming invasive procedure, Emergency department visit, Upcoming dental procedure, Extra doses, Change in medications, Hospital admission, Bruising, Other complaints   Comments:  Spoke to patient on  4/18 after INR resulted on 4/17. Per patient he did not take any warfarin yesterday (4/17), but otherwise has taken warfarin as instructed. States he may have had reduced amount of GLV which would explain elevated INR.        Plan:  1. INR is SUPRAtherapeutic today at 3.5 (goal 2.0-3.0). Based on held dose from yesterday, have instructed Mr. Houser to continue maintenance dose of warfarin 2.5 mg oral daily except 5 mg on Mon WedSat until recheck (25 mg/week)   2. Repeat INR in 2 weeks, 5/1/23   3. Patient requests 2.5mg instead of 5mg tablets  4. Verbal information provided over the phone. Kurt Houser RBV dosing instructions, expresses understanding by teach back, and has no further questions at this time.    Mahesh Mathur,  PharmD  04/18/23  09:49 EDT

## 2023-04-24 RX ORDER — WARFARIN SODIUM 5 MG/1
TABLET ORAL
Qty: 90 TABLET | Refills: 0 | Status: SHIPPED | OUTPATIENT
Start: 2023-04-24

## 2023-05-01 ENCOUNTER — CLINICAL SUPPORT (OUTPATIENT)
Dept: CARDIOLOGY | Facility: CLINIC | Age: 83
End: 2023-05-01
Payer: MEDICARE

## 2023-05-01 ENCOUNTER — ANTICOAGULATION VISIT (OUTPATIENT)
Dept: PHARMACY | Facility: HOSPITAL | Age: 83
End: 2023-05-01
Payer: MEDICARE

## 2023-05-01 DIAGNOSIS — I48.0 PAF (PAROXYSMAL ATRIAL FIBRILLATION): Primary | ICD-10-CM

## 2023-05-01 LAB — INR PPP: 2.4 (ref 0.9–1.1)

## 2023-05-01 NOTE — PROGRESS NOTES
Venipuncture Blood Specimen Collection  Venipuncture performed in clinic by Ladi Dover MA with good hemostasis. Patient tolerated the procedure well without complications.   05/01/23   Ladi Dover MA

## 2023-05-01 NOTE — PROGRESS NOTES
Anticoagulation Clinic - Remote Progress Note  Remote Lab    Indication: paroxysmal afib  Referring Provider: Buddy Javier  Initial Warfarin Start Date:   Goal INR: 2.0-3.0  Current Drug Interactions: omega three fatty acid; CoQ-10   [id number]: 3 (HTN, Age)    Diet: ~ 2 Tablespoon QOD mixed cooked greens; green beans 2x weekly 2/20/2023  Alcohol: None  Tobacco: None  OTC Pain Medication: APAP PRN    INR History:  Date 3/23 3/30 4/8 4/16 4/23 5/7 6/4 6/18 7/2 7/19 7/28 8/11   Total Weekly Dose 27.5mg 25mg 25mg 25mg 25mg 25mg 25mg 25mg 25mg 25mg 25mg 25mg   INR 3.2 2.7 3.2 2.0 2.4 2.7 1.8 1.8 2.3 1.9 1.9 2.6   Notes   LD doxy 4/8    incr GLV  decr GLV incr GLV  incr GLV decr GLV     Date 8/25 9/22 10/20 11/18 11/29 12/28  3/2/22 3/1/ 4/5/22 4/11 4/27   Total WeeklyDose 25mg 25mg 25mg 25mg 25mg 25mg non- compliant 25mg 25 mg 25 mg 25mg 25 mg   INR 2.1 2.8 2.8 3.1 2.4 2.0  2.9 3.2 3.5 2.5 2.8   Notes    decr GLV    APAP  decr GLV       Date 5/31 6/28 7/26 8/25 9/8 10/6 10/20 11/10 11/30 1/4 2/2   Total Weekly Dose 25 mg 25 mg 25 mg 25 mg 25 mg 25 mg 25mg 25mg 25 mg 25 mg 25mg   INR 2.9 2.8 2.6 3.5 2.9 3.5 2.5 2.6 2.7 3.0 3.6   Notes                Date 2/17  3/16 4/17 5/1          Total Weekly Dose 25mg  25 mg 25 mg  25 mg           INR 2.7 3.3 3.5 2.4          Notes                Phone Interview:  Tablet Strength: 5mg; 2.5mg  Patient Contact Info: [phone] (Mobile)  Estimated OOP cost: will send if patient comes to [place]   Verbal Release Auth:   Lab Contact Info: [facility]    Patient Findings    Negatives:  Signs/symptoms of thrombosis, Signs/symptoms of bleeding, Laboratory test error suspected, Change in health, Change in alcohol use, Change in activity, Upcoming invasive procedure, Emergency department visit, Upcoming dental procedure, Missed doses, Extra doses, Change in medications, Change in diet/appetite, Hospital admission, Bruising, Other complaints   Comments:  Has increased his GLV  back to normal intake since last visit. Otherwise patient denies all findings.        Plan:  1. INR is therapeutic today at 2.4 (goal 2.0-3.0). Have instructed Mr. Houser to continue maintenance dose of warfarin 2.5 mg oral daily except 5 mg on Mon WedSat until recheck (25 mg/week)   2. Repeat INR in 4 weeks, 5/29/23   3. Patient requests 2.5mg instead of 5mg tablets  4. Verbal information provided over the phone. Kurt Houser RBV dosing instructions, expresses understanding by teach back, and has no further questions at this time.    Mahesh Mathur,  PharmD  05/01/23  14:06 EDT

## 2023-05-22 RX ORDER — MELOXICAM 15 MG/1
TABLET ORAL
Qty: 90 TABLET | Refills: 0 | OUTPATIENT
Start: 2023-05-22

## 2023-05-23 ENCOUNTER — TELEPHONE (OUTPATIENT)
Dept: FAMILY MEDICINE CLINIC | Facility: CLINIC | Age: 83
End: 2023-05-23

## 2023-05-23 NOTE — TELEPHONE ENCOUNTER
Caller: [facility]    Relationship: Emergency Contact    Best call back number:      [phone]      Which medication are you concerned about:    meloxicam (MOBIC) 15 MG tablet    Who prescribed you this medication:     DR SHI    What are your concerns:     CALLER STATED PHARMACY IS UNABLE TO REFILL MEDICATION SINCE PROVIDER DENIED THE REFILL    PLEASE CONTACT CALLER WITH UPDATES AS TO WHY MEDICATION REFILL WAS DENIED

## 2023-05-24 RX ORDER — MELOXICAM 15 MG/1
15 TABLET ORAL AS NEEDED
Qty: 30 TABLET | Refills: 1 | OUTPATIENT
Start: 2023-05-24

## 2023-05-24 NOTE — TELEPHONE ENCOUNTER
We did not deny this refill we have not received a request for a refill of his melocam, will send to Dr. Cordova

## 2023-05-30 ENCOUNTER — OFFICE VISIT (OUTPATIENT)
Dept: CARDIOLOGY | Facility: CLINIC | Age: 83
End: 2023-05-30

## 2023-05-30 ENCOUNTER — ANTICOAGULATION VISIT (OUTPATIENT)
Dept: PHARMACY | Facility: HOSPITAL | Age: 83
End: 2023-05-30

## 2023-05-30 VITALS
WEIGHT: 223 LBS | OXYGEN SATURATION: 94 % | HEIGHT: 74 IN | BODY MASS INDEX: 28.62 KG/M2 | HEART RATE: 89 BPM | DIASTOLIC BLOOD PRESSURE: 64 MMHG | RESPIRATION RATE: 18 BRPM | SYSTOLIC BLOOD PRESSURE: 126 MMHG

## 2023-05-30 DIAGNOSIS — I10 HYPERTENSION, ESSENTIAL: ICD-10-CM

## 2023-05-30 DIAGNOSIS — I48.20 ATRIAL FIBRILLATION, CHRONIC: ICD-10-CM

## 2023-05-30 DIAGNOSIS — E78.2 HYPERLIPIDEMIA, MIXED: ICD-10-CM

## 2023-05-30 DIAGNOSIS — E66.01 MORBID (SEVERE) OBESITY DUE TO EXCESS CALORIES: Primary | ICD-10-CM

## 2023-05-30 PROBLEM — I48.0 PAF (PAROXYSMAL ATRIAL FIBRILLATION): Status: RESOLVED | Noted: 2021-02-11 | Resolved: 2023-05-30

## 2023-05-30 LAB — INR PPP: 3.1 (ref 0.9–1.1)

## 2023-05-30 RX ORDER — MULTIVITAMIN WITH IRON
250 TABLET ORAL DAILY
COMMUNITY

## 2023-05-30 NOTE — PROGRESS NOTES
Anticoagulation Clinic - Remote Progress Note  Remote Lab    Indication: paroxysmal afib  Referring Provider: Buddy Javier  Initial Warfarin Start Date:   Goal INR: 2.0-3.0  Current Drug Interactions: omega three fatty acid; CoQ-10   [id number]: 3 (HTN, Age)    Diet: ~ 2 Tablespoon QOD mixed cooked greens; green beans 2x weekly 2/20/2023  Alcohol: None  Tobacco: None  OTC Pain Medication: APAP PRN    INR History:  Date 3/23 3/30 4/8 4/16 4/23 5/7 6/4 6/18 7/2 7/19 7/28 8/11   Total Weekly Dose 27.5mg 25mg 25mg 25mg 25mg 25mg 25mg 25mg 25mg 25mg 25mg 25mg   INR 3.2 2.7 3.2 2.0 2.4 2.7 1.8 1.8 2.3 1.9 1.9 2.6   Notes   LD doxy 4/8    incr GLV  decr GLV incr GLV  incr GLV decr GLV     Date 8/25 9/22 10/20 11/18 11/29 12/28  3/2/22 3/1/ 4/5/22 4/11 4/27   Total WeeklyDose 25mg 25mg 25mg 25mg 25mg 25mg non- compliant 25mg 25 mg 25 mg 25mg 25 mg   INR 2.1 2.8 2.8 3.1 2.4 2.0  2.9 3.2 3.5 2.5 2.8   Notes    decr GLV    APAP  decr GLV       Date 5/31 6/28 7/26 8/25 9/8 10/6 10/20 11/10 11/30 1/4 2/2   Total Weekly Dose 25 mg 25 mg 25 mg 25 mg 25 mg 25 mg 25mg 25mg 25 mg 25 mg 25mg   INR 2.9 2.8 2.6 3.5 2.9 3.5 2.5 2.6 2.7 3.0 3.6   Notes                Date 2/17  3/16 4/17 5/1 5/30         Total Weekly Dose 25mg  25 mg 25 mg  25 mg  25 mg         INR 2.7 3.3 3.5 2.4 3.1         Notes                Phone Interview:  Tablet Strength: 5mg; 2.5mg  Patient Contact Info: [phone] (Mobile)  Estimated OOP cost: will send if patient comes to [place]   Verbal Release Auth:   Lab Contact Info: [facility]      Negatives:  Signs/symptoms of thrombosis, Signs/symptoms of bleeding, Laboratory test error suspected, Change in health, Change in alcohol use, Change in activity, Upcoming invasive procedure, Emergency department visit, Upcoming dental procedure, Missed doses, Extra doses, Change in medications, Change in diet/appetite, Hospital admission, Bruising, Other complaints         Plan:  1. INR is therapeutic  today at 2.4 (goal 2.0-3.0). Have instructed Mr. Houser to have an extra serving of GLV weekly and continue maintenance dose of warfarin 2.5 mg oral daily except 5 mg on Mon WedSat until recheck (25 mg/week)   2. Repeat INR in 2 weeks  3. Patient requests 2.5mg instead of 5mg tablets  4. Verbal information provided over the phone. Kurt Houser RBV dosing instructions, expresses understanding by teach back, and has no further questions at this time.    Regine Adame, PharmD.  05/31/23   10:15 EDT

## 2023-05-30 NOTE — PROGRESS NOTES
[facility] [place]  [facility] CARDIOLOGY  [address] [place] [address]  Dept: [phone]  Dept Fax: [phone]    Kurt Houser  1940    Follow Up Office Visit Note    History of Present Illness:  Kurt Houser is a 83 y.o. male who presents to the clinic for Follow-up. Chronic atrial fibrillation- Rate control denies any palpitations, SOB, edema, bleeding BP is 130.60, on No AV blockers just warfarin    The following portions of the patient's history were reviewed and updated as appropriate: allergies, current medications, past family history, past medical history, past social history, past surgical history, and problem list.    Medications:  amLODIPine  B-12 capsule  CoQ-10 capsule  fish oil capsule  gabapentin  levothyroxine  lisinopril  Magnesium tablet  meloxicam  rosuvastatin  Vitamin D3 tablet  warfarin    Subjective  Allergies   Allergen Reactions   • Acetaminophen Mental Status Change   • Hydrocodone Bitart (Antituss) [Hydrocodone] Mental Status Change        Past Medical History:   Diagnosis Date   • Acute osteomyelitis of foot    • Amputated toe of left foot    • Anemia of decreased vitamin B12 absorption    • BMI 27.0-27.9,adult    • Degenerative joint disease involving multiple joints    • Diverticular disease of colon    • Drug therapy    • Dyslipidemia    • Essential hypertension    • Foot osteomyelitis    • Frequent PVCs    • Glaucoma    • High risk medication use    • History of adenomatous polyp of colon    • Long term current use of anticoagulant    • Low density lipoprotein (LDL) cholesterol level below 100mg/dL    • Mixed hyperlipidemia    • Neuropathy    • Osteoarthritis    • Paroxysmal atrial fibrillation    • Polyneuropathy    • Prediabetes    • Ventricular premature beats    • Vitamin B12 deficiency    • Vitamin D deficiency        Past Surgical History:   Procedure Laterality Date   • CATARACT EXTRACTION, BILATERAL  2010   • FOOT SURGERY Left    •  "TRANS METATARSAL AMPUTATION  2020    5th metatarsal amputation/IV abx   • WRIST SURGERY Left        Family History   Problem Relation Age of Onset   • Colon cancer Father    • Liver cancer Brother         Social History     Socioeconomic History   • Marital status:    Tobacco Use   • Smoking status: Former     Packs/day: 1.00     Years: 27.00     Pack years: 27.00     Types: Cigarettes, Cigars     Start date:      Quit date: 2000     Years since quittin.4   • Smokeless tobacco: Never   • Tobacco comments:     1 cigar/day, 2.40 years   Vaping Use   • Vaping Use: Never used   Substance and Sexual Activity   • Alcohol use: Not Currently     Comment: about every 1-2 years he mighst have a drink   • Drug use: Never   • Sexual activity: Defer       Review of Systems   Constitutional: Negative.    HENT: Negative.    Respiratory: Negative.    Cardiovascular: Negative.    Endocrine: Negative.    Genitourinary: Negative.    Musculoskeletal: Negative.    Skin: Negative.    Allergic/Immunologic: Negative.    Neurological: Negative.    Hematological: Negative.    Psychiatric/Behavioral: Negative.        Cardiovascular Procedures    ECHO/MUGA:  STRESS TESTS:   CARDIAC CATH:   DEVICES:   HOLTER:   CT/MRI:   VASCULAR:   CARDIOTHORACIC:     Objective  Vitals:    23 1327   BP: 126/64   BP Location: Right arm   Patient Position: Sitting   Cuff Size: Large Adult   Pulse: 89   Resp: 18   SpO2: 94%   Weight: 101 kg (223 lb)   Height: 188 cm (74\")   PainSc: 10-Worst pain ever   PainLoc: Generalized     Body mass index is 28.63 kg/m².     Physical Exam  Vitals reviewed.   Constitutional:       Appearance: Healthy appearance. Not in distress.   Eyes:      Pupils: Pupils are equal, round, and reactive to light.   HENT:    Mouth/Throat:      Pharynx: Oropharynx is clear.   Neck:      Thyroid: Thyroid normal.      Vascular: No JVR. JVD normal.   Pulmonary:      Effort: Pulmonary effort is normal.      Breath sounds: " Normal breath sounds. No wheezing. No rhonchi. No rales.   Chest:      Chest wall: Not tender to palpatation.   Cardiovascular:      PMI at left midclavicular line. Normal rate. Irregularly irregular rhythm. Normal S1. Normal S2.      Murmurs: There is no murmur.      No gallop. No click. No rub.   Pulses:     Intact distal pulses.   Edema:     Peripheral edema absent.   Abdominal:      General: Bowel sounds are normal.      Palpations: Abdomen is soft.      Tenderness: There is no abdominal tenderness.   Musculoskeletal: Normal range of motion.         General: No tenderness.      Cervical back: Normal range of motion and neck supple. Skin:     General: Skin is warm and dry.   Neurological:      General: No focal deficit present.      Mental Status: Alert and oriented to person, place and time.          Diagnostic Data  Procedures    Assessment and Plan  Diagnoses and all orders for this visit:        Hypertension, essential- BP is 130.60, on Lisinopril 40 mg and also Amlodipine 5 mg, no complaints    Hyperlipidemia, mixed- He has pain with Crestor now, he will  hold it for 2-3 weeks and he will let me know, has bad knees and maybe his complaints are related to the knee issue    Atrial fibrillation, chronic- Rate control on No AV blockers just warafrin  -     POC Protime / INR    Other orders  -     Magnesium 250 MG tablet; Take 1 tablet by mouth Daily.         Return in about 6 months (around 11/30/2023) for Recheck with Dr. Javier.    Buddy Javier MD  05/30/2023

## 2023-05-30 NOTE — PROGRESS NOTES
Capillary Blood Specimen Collection  Capillary blood collection performed in CLINIC by Ladi Dover MA. Patient tolerated the procedure well without complications.   05/30/23   Ladi Dover MA

## 2023-06-13 ENCOUNTER — LAB (OUTPATIENT)
Dept: FAMILY MEDICINE CLINIC | Facility: CLINIC | Age: 83
End: 2023-06-13
Payer: MEDICARE

## 2023-06-13 DIAGNOSIS — Z79.899 ENCOUNTER FOR LONG-TERM (CURRENT) USE OF OTHER MEDICATIONS: Primary | ICD-10-CM

## 2023-06-13 PROCEDURE — 36415 COLL VENOUS BLD VENIPUNCTURE: CPT | Performed by: FAMILY MEDICINE

## 2023-06-14 LAB
ALBUMIN SERPL-MCNC: 4.1 G/DL (ref 3.6–4.6)
ALBUMIN/GLOB SERPL: 1.2 {RATIO} (ref 1.2–2.2)
ALP SERPL-CCNC: 63 IU/L (ref 44–121)
ALT SERPL-CCNC: 8 IU/L (ref 0–44)
AST SERPL-CCNC: 14 IU/L (ref 0–40)
BASOPHILS # BLD AUTO: 0 X10E3/UL (ref 0–0.2)
BASOPHILS NFR BLD AUTO: 0 %
BILIRUB SERPL-MCNC: 0.3 MG/DL (ref 0–1.2)
BUN SERPL-MCNC: 31 MG/DL (ref 8–27)
BUN/CREAT SERPL: 23 (ref 10–24)
CALCIUM SERPL-MCNC: 9.3 MG/DL (ref 8.6–10.2)
CHLORIDE SERPL-SCNC: 98 MMOL/L (ref 96–106)
CHOLEST SERPL-MCNC: 218 MG/DL (ref 100–199)
CK SERPL-CCNC: 61 U/L (ref 30–208)
CO2 SERPL-SCNC: 24 MMOL/L (ref 20–29)
CREAT SERPL-MCNC: 1.33 MG/DL (ref 0.76–1.27)
EGFRCR SERPLBLD CKD-EPI 2021: 53 ML/MIN/1.73
EOSINOPHIL # BLD AUTO: 0.1 X10E3/UL (ref 0–0.4)
EOSINOPHIL NFR BLD AUTO: 2 %
ERYTHROCYTE [DISTWIDTH] IN BLOOD BY AUTOMATED COUNT: 13.5 % (ref 11.6–15.4)
GLOBULIN SER CALC-MCNC: 3.3 G/DL (ref 1.5–4.5)
GLUCOSE SERPL-MCNC: 105 MG/DL (ref 70–99)
HBA1C MFR BLD: 6.2 % (ref 4.8–5.6)
HCT VFR BLD AUTO: 37 % (ref 37.5–51)
HDLC SERPL-MCNC: 38 MG/DL
HGB BLD-MCNC: 11.6 G/DL (ref 13–17.7)
IMM GRANULOCYTES # BLD AUTO: 0 X10E3/UL (ref 0–0.1)
IMM GRANULOCYTES NFR BLD AUTO: 0 %
LDLC SERPL CALC-MCNC: 147 MG/DL (ref 0–99)
LYMPHOCYTES # BLD AUTO: 1.7 X10E3/UL (ref 0.7–3.1)
LYMPHOCYTES NFR BLD AUTO: 27 %
MCH RBC QN AUTO: 29.3 PG (ref 26.6–33)
MCHC RBC AUTO-ENTMCNC: 31.4 G/DL (ref 31.5–35.7)
MCV RBC AUTO: 93 FL (ref 79–97)
MONOCYTES # BLD AUTO: 0.7 X10E3/UL (ref 0.1–0.9)
MONOCYTES NFR BLD AUTO: 11 %
NEUTROPHILS # BLD AUTO: 3.8 X10E3/UL (ref 1.4–7)
NEUTROPHILS NFR BLD AUTO: 60 %
PLATELET # BLD AUTO: 248 X10E3/UL (ref 150–450)
POTASSIUM SERPL-SCNC: 4.5 MMOL/L (ref 3.5–5.2)
PROT SERPL-MCNC: 7.4 G/DL (ref 6–8.5)
RBC # BLD AUTO: 3.96 X10E6/UL (ref 4.14–5.8)
SODIUM SERPL-SCNC: 137 MMOL/L (ref 134–144)
TRIGL SERPL-MCNC: 184 MG/DL (ref 0–149)
TSH SERPL DL<=0.005 MIU/L-ACNC: 3.96 UIU/ML (ref 0.45–4.5)
VLDLC SERPL CALC-MCNC: 33 MG/DL (ref 5–40)
WBC # BLD AUTO: 6.4 X10E3/UL (ref 3.4–10.8)

## 2023-06-20 PROBLEM — R26.9 ABNORMAL GAIT: Status: ACTIVE | Noted: 2023-06-20

## 2023-07-21 DIAGNOSIS — G62.9 NEUROPATHY: ICD-10-CM

## 2023-07-24 RX ORDER — GABAPENTIN 600 MG/1
TABLET ORAL
Qty: 180 TABLET | Refills: 0 | OUTPATIENT
Start: 2023-07-24

## 2023-07-25 ENCOUNTER — ANTICOAGULATION VISIT (OUTPATIENT)
Dept: PHARMACY | Facility: HOSPITAL | Age: 83
End: 2023-07-25
Payer: MEDICARE

## 2023-07-25 ENCOUNTER — CLINICAL SUPPORT (OUTPATIENT)
Dept: CARDIOLOGY | Facility: CLINIC | Age: 83
End: 2023-07-25
Payer: MEDICARE

## 2023-07-25 DIAGNOSIS — I48.20 ATRIAL FIBRILLATION, CHRONIC: Primary | ICD-10-CM

## 2023-07-25 LAB — INR PPP: 4 (ref 0.9–1.1)

## 2023-07-25 PROCEDURE — 36416 COLLJ CAPILLARY BLOOD SPEC: CPT | Performed by: INTERNAL MEDICINE

## 2023-07-25 NOTE — PROGRESS NOTES
Anticoagulation Clinic - Remote Progress Note  Remote Lab    Indication: paroxysmal afib  Referring Provider: Buddy Javier  Initial Warfarin Start Date:   Goal INR: 2.0-3.0  Current Drug Interactions: omega three fatty acid; CoQ-10   [id number]: 3 (HTN, Age)    Diet: ~ 2 Tablespoon QOD mixed cooked greens; green beans 2x weekly 2/20/2023  Alcohol: None  Tobacco: None  OTC Pain Medication: APAP PRN    INR History:  Date 3/23 3/30 4/8 4/16 4/23 5/7 6/4 6/18 7/2 7/19 7/28 8/11   Total Weekly Dose 27.5mg 25mg 25mg 25mg 25mg 25mg 25mg 25mg 25mg 25mg 25mg 25mg   INR 3.2 2.7 3.2 2.0 2.4 2.7 1.8 1.8 2.3 1.9 1.9 2.6   Notes   LD doxy 4/8    incr GLV  decr GLV incr GLV  incr GLV decr GLV     Date 8/25 9/22 10/20 11/18 11/29 12/28  3/2/22 3/1/ 4/5/22 4/11 4/27   Total WeeklyDose 25mg 25mg 25mg 25mg 25mg 25mg non- compliant 25mg 25 mg 25 mg 25mg 25 mg   INR 2.1 2.8 2.8 3.1 2.4 2.0  2.9 3.2 3.5 2.5 2.8   Notes    decr GLV    APAP  decr GLV       Date 5/31 6/28 7/26 8/25 9/8 10/6 10/20 11/10 11/30 1/4 2/2   Total Weekly Dose 25 mg 25 mg 25 mg 25 mg 25 mg 25 mg 25mg 25mg 25 mg 25 mg 25mg   INR 2.9 2.8 2.6 3.5 2.9 3.5 2.5 2.6 2.7 3.0 3.6   Notes                Date 2/17  3/16 4/17 5/1 5/30 6/23  7/25       Total Weekly Dose 25mg  25 mg 25 mg  25 mg  25 mg 25mg  25 mg       INR 2.7 3.3 3.5 2.4 3.1 2.9  4.0       Notes                Phone Interview:  Tablet Strength: 5mg; 2.5mg  Patient Contact Info: [phone] (Mobile)  Estimated OOP cost: will send if patient comes to [place]   Verbal Release Auth:   Lab Contact Info: [facility] PO[facility]        Negatives:  Signs/symptoms of thrombosis, Signs/symptoms of bleeding, Laboratory test error suspected, Change in health, Change in alcohol use, Change in activity, Upcoming invasive procedure, Emergency department visit, Upcoming dental procedure, Missed doses, Extra doses, Change in medications, Change in diet/appetite, Hospital admission, Bruising, Other complaints        Plan:    1. INR is supra therapeutic yesterday at 4 (goal 2.0-3.0). Patient held last night's dose Instructed Mr. Houser to begin reduced maintenance dose of warfarin 2.5 mg oral daily except 5 mg WedSat until recheck (22.5 mg/week)   2. Repeat INR in 2  weeks  3. Verbal information provided over the phone. Kurt Houser RBV dosing instructions, expresses understanding by teach back, and has no further questions at this time.    Regine Adame, BeanD.  07/26/23   10:18 EDT

## 2023-07-25 NOTE — PROGRESS NOTES
Capillary Blood Specimen Collection  Capillary blood collection performed in clinic by Ladi Dovre MA. Patient tolerated the procedure well without complications.   07/25/23   Ladi Dover MA

## 2023-07-27 ENCOUNTER — TELEPHONE (OUTPATIENT)
Dept: FAMILY MEDICINE CLINIC | Facility: CLINIC | Age: 83
End: 2023-07-27
Payer: MEDICARE

## 2023-07-27 DIAGNOSIS — G62.9 NEUROPATHY: ICD-10-CM

## 2023-07-27 RX ORDER — GABAPENTIN 600 MG/1
1200 TABLET ORAL 3 TIMES DAILY
Qty: 180 TABLET | Refills: 2 | Status: SHIPPED | OUTPATIENT
Start: 2023-07-27

## 2023-07-27 RX ORDER — GABAPENTIN 600 MG/1
TABLET ORAL
Qty: 180 TABLET | Refills: 0 | OUTPATIENT
Start: 2023-07-27

## 2023-07-27 NOTE — TELEPHONE ENCOUNTER
Rx Refill Note    Requested Prescriptions     Pending Prescriptions Disp Refills    gabapentin (NEURONTIN) 600 MG tablet [Pharmacy Med Name: Gabapentin 600 MG Oral Tablet] 180 tablet 0     Sig: TAKE 2 TABLETS BY MOUTH THREE TIMES DAILY        Last office visit with prescribing clinician: 6/20/2023      Next office visit with prescribing clinician: 9/18/2023   Last labs:   Last refill: 03/16/2023   Pharmacy (be sure to add in Epic). correct

## 2023-07-27 NOTE — TELEPHONE ENCOUNTER
Caller: Kurt Houser    Relationship to patient: Self    Best call back number: [phone]     Patient is needing: PATIENTS WIFE CALLED TO FOLLOW UP REGARDING GABAPENTIN REFILL.

## 2023-08-01 RX ORDER — WARFARIN SODIUM 5 MG/1
TABLET ORAL
Qty: 60 TABLET | Refills: 1 | Status: SHIPPED | OUTPATIENT
Start: 2023-08-01

## 2023-08-08 ENCOUNTER — CLINICAL SUPPORT (OUTPATIENT)
Dept: CARDIOLOGY | Facility: CLINIC | Age: 83
End: 2023-08-08
Payer: MEDICARE

## 2023-08-08 ENCOUNTER — ANTICOAGULATION VISIT (OUTPATIENT)
Dept: PHARMACY | Facility: HOSPITAL | Age: 83
End: 2023-08-08
Payer: MEDICARE

## 2023-08-08 DIAGNOSIS — I48.20 ATRIAL FIBRILLATION, CHRONIC: Primary | ICD-10-CM

## 2023-08-08 LAB — INR PPP: 2.5 (ref 0.9–1.1)

## 2023-08-08 NOTE — PROGRESS NOTES
Anticoagulation Clinic - Remote Progress Note  Remote Lab    Indication: paroxysmal afib  Referring Provider: Buddy Javier  Initial Warfarin Start Date:   Goal INR: 2.0-3.0  Current Drug Interactions: omega three fatty acid; CoQ-10   [id number]: 3 (HTN, Age)    Diet: ~ 2 Tablespoon QOD mixed cooked greens; green beans 2x weekly 2/20/2023  Alcohol: None  Tobacco: None  OTC Pain Medication: APAP PRN    INR History:  Date 3/23 3/30 4/8 4/16 4/23 5/7 6/4 6/18 7/2 7/19 7/28 8/11   Total Weekly Dose 27.5mg 25mg 25mg 25mg 25mg 25mg 25mg 25mg 25mg 25mg 25mg 25mg   INR 3.2 2.7 3.2 2.0 2.4 2.7 1.8 1.8 2.3 1.9 1.9 2.6   Notes   LD doxy 4/8    incr GLV  decr GLV incr GLV  incr GLV decr GLV     Date 8/25 9/22 10/20 11/18 11/29 12/28  3/2/22 3/1/ 4/5/22 4/11 4/27   Total WeeklyDose 25mg 25mg 25mg 25mg 25mg 25mg non- compliant 25mg 25 mg 25 mg 25mg 25 mg   INR 2.1 2.8 2.8 3.1 2.4 2.0  2.9 3.2 3.5 2.5 2.8   Notes    decr GLV    APAP  decr GLV       Date 5/31 6/28 7/26 8/25 9/8 10/6 10/20 11/10 11/30 1/4 2/2   Total Weekly Dose 25 mg 25 mg 25 mg 25 mg 25 mg 25 mg 25mg 25mg 25 mg 25 mg 25mg   INR 2.9 2.8 2.6 3.5 2.9 3.5 2.5 2.6 2.7 3.0 3.6   Notes                Date 2/17  3/16 4/17 5/1 5/30 6/23  7/25 8/8      Total Weekly Dose 25mg  25 mg 25 mg  25 mg  25 mg 25mg  25 mg 22.5mg      INR 2.7 3.3 3.5 2.4 3.1 2.9  4.0 2.5      Notes                Phone Interview:  Tablet Strength: 5mg; 2.5mg  Patient Contact Info: [phone] (Mobile)  Estimated OOP cost: will send if patient comes to [place]   Verbal Release Auth:   Lab Contact Info: [facility] PO[facility]        Negatives:  Signs/symptoms of thrombosis, Signs/symptoms of bleeding, Laboratory test error suspected, Change in health, Change in alcohol use, Change in activity, Upcoming invasive procedure, Emergency department visit, Upcoming dental procedure, Missed doses, Extra doses, Change in medications, Change in diet/appetite, Hospital admission, Bruising, Other complaints        Plan:    1. INR is therapeutic at 2.5 (goal 2.0-3.0). Instructed Mr. Houser to begin reduced maintenance dose of warfarin 2.5 mg oral daily except 5 mg WedSat until recheck (22.5 mg/week)   2. Repeat INR in 2  weeks, if WNL will resume q4w  3. Verbal information provided over the phone. Kurt Houser RBV dosing instructions, expresses understanding by teach back, and has no further questions at this time.    Regine dAame, BeanD.  08/08/23   15:18 EDT

## 2023-08-08 NOTE — PROGRESS NOTES
Capillary Blood Specimen Collection  Capillary blood collection performed in clinic by Arti Chin MA. Patient tolerated the procedure well without complications.   08/08/23   Arti Chin MA

## 2023-08-10 RX ORDER — WARFARIN SODIUM 2.5 MG/1
TABLET ORAL
Qty: 45 TABLET | Refills: 0 | Status: SHIPPED | OUTPATIENT
Start: 2023-08-10

## 2023-08-22 ENCOUNTER — CLINICAL SUPPORT (OUTPATIENT)
Dept: CARDIOLOGY | Facility: CLINIC | Age: 83
End: 2023-08-22
Payer: MEDICARE

## 2023-08-22 DIAGNOSIS — I48.20 ATRIAL FIBRILLATION, CHRONIC: Primary | ICD-10-CM

## 2023-08-22 LAB — INR PPP: 2.9 (ref 0.9–1.1)

## 2023-08-22 PROCEDURE — 36416 COLLJ CAPILLARY BLOOD SPEC: CPT | Performed by: INTERNAL MEDICINE

## 2023-08-22 NOTE — PROGRESS NOTES
Capillary Blood Specimen Collection  Capillary blood collection performed in clinic by Ladi Dover MA. Patient tolerated the procedure well without complications.   08/22/23   Ladi Dover MA

## 2023-08-23 ENCOUNTER — ANTICOAGULATION VISIT (OUTPATIENT)
Dept: PHARMACY | Facility: HOSPITAL | Age: 83
End: 2023-08-23
Payer: MEDICARE

## 2023-08-23 NOTE — PROGRESS NOTES
Anticoagulation Clinic - Remote Progress Note  Remote Lab    Indication: paroxysmal afib  Referring Provider: Buddy Javier  Initial Warfarin Start Date:   Goal INR: 2.0-3.0  Current Drug Interactions: omega three fatty acid; CoQ-10   [id number]: 3 (HTN, Age)    Diet: ~ 2 Tablespoon QOD mixed cooked greens; green beans 2x weekly 2/20/2023  Alcohol: None  Tobacco: None  OTC Pain Medication: APAP PRN    INR History:  Date 3/23 3/30 4/8 4/16 4/23 5/7 6/4 6/18 7/2 7/19 7/28 8/11   Total Weekly Dose 27.5mg 25mg 25mg 25mg 25mg 25mg 25mg 25mg 25mg 25mg 25mg 25mg   INR 3.2 2.7 3.2 2.0 2.4 2.7 1.8 1.8 2.3 1.9 1.9 2.6   Notes   LD doxy 4/8    incr GLV  decr GLV incr GLV  incr GLV decr GLV     Date 8/25 9/22 10/20 11/18 11/29 12/28  3/2/22 3/1/ 4/5/22 4/11 4/27   Total WeeklyDose 25mg 25mg 25mg 25mg 25mg 25mg non- compliant 25mg 25 mg 25 mg 25mg 25 mg   INR 2.1 2.8 2.8 3.1 2.4 2.0  2.9 3.2 3.5 2.5 2.8   Notes    decr GLV    APAP  decr GLV       Date 5/31 6/28 7/26 8/25 9/8 10/6 10/20 11/10 11/30 1/4 2/2   Total Weekly Dose 25 mg 25 mg 25 mg 25 mg 25 mg 25 mg 25mg 25mg 25 mg 25 mg 25mg   INR 2.9 2.8 2.6 3.5 2.9 3.5 2.5 2.6 2.7 3.0 3.6   Notes                Date 2/17  3/16 4/17 5/1 5/30 6/23  7/25 8/8 08/22     Total Weekly Dose 25mg  25 mg 25 mg  25 mg  25 mg 25mg  25 mg 22.5mg 22.5 mg     INR 2.7 3.3 3.5 2.4 3.1 2.9  4.0 2.5 2.9     Notes                Phone Interview:  Tablet Strength: 5mg; 2.5mg  Patient Contact Info: [phone] (Mobile)  Estimated OOP cost: will send if patient comes to [place]   Verbal Release Auth:   Lab Contact Info: [facility] PO[facility]        Patient Findings    Negatives: Signs/symptoms of thrombosis, Signs/symptoms of bleeding, Laboratory test error suspected, Change in health, Change in alcohol use, Change in activity, Upcoming invasive procedure, Emergency department visit, Upcoming dental procedure, Missed doses, Extra doses, Change in medications, Change in diet/appetite, Hospital  admission, Bruising, Other complaints   Comments: PT stated, he prefer to test every 4 weeks if in range.  All findings negative per pt.         Plan:    1. INR is therapeutic at 2.9 (goal 2.0-3.0). Instructed Mr. Houser to continue maintenance dose of warfarin 2.5 mg oral daily except 5 mg WedSat until recheck (22.5 mg/week)   2. Repeat INR in 4 weeks.  3. Verbal information provided over the phone. Kurt Houser RBV dosing instructions, expresses understanding by teach back, and has no further questions at this time.      Emerald Wagner  Pharmacy Technician  8/23/2023 10:46 EDT    Pt requested q4w check, seems reasonable with two therapeutic INRs  I, Charmaine Avery, PharmD, have reviewed the note in full and agree with the assessment and plan.  08/23/23  12:20 EDT

## 2023-09-05 RX ORDER — WARFARIN SODIUM 2.5 MG/1
TABLET ORAL
Qty: 45 TABLET | Refills: 1 | Status: SHIPPED | OUTPATIENT
Start: 2023-09-05

## 2023-09-13 NOTE — TELEPHONE ENCOUNTER
Rx Refill Note    Requested Prescriptions     Pending Prescriptions Disp Refills    vitamin D3 125 MCG (5000 UT) capsule capsule [Pharmacy Med Name: Vitamin D3 125 MCG (5000 UT) Oral Capsule] 90 capsule 0     Sig: Take 1 capsule by mouth once daily        Last office visit with prescribing clinician: 6/20/2023      Next office visit with prescribing clinician: 9/18/2023   Last labs:   Last refill: looks like [id number] was sent last   Pharmacy (be sure to add in Epic). correct

## 2023-09-18 ENCOUNTER — OFFICE VISIT (OUTPATIENT)
Dept: FAMILY MEDICINE CLINIC | Facility: CLINIC | Age: 83
End: 2023-09-18
Payer: MEDICARE

## 2023-09-18 VITALS
HEART RATE: 58 BPM | SYSTOLIC BLOOD PRESSURE: 132 MMHG | WEIGHT: 222 LBS | DIASTOLIC BLOOD PRESSURE: 88 MMHG | OXYGEN SATURATION: 98 % | RESPIRATION RATE: 16 BRPM | BODY MASS INDEX: 28.49 KG/M2 | HEIGHT: 74 IN

## 2023-09-18 DIAGNOSIS — E78.2 HYPERLIPIDEMIA, MIXED: ICD-10-CM

## 2023-09-18 DIAGNOSIS — R26.9 ABNORMAL GAIT: ICD-10-CM

## 2023-09-18 DIAGNOSIS — R73.9 HYPERGLYCEMIA: ICD-10-CM

## 2023-09-18 DIAGNOSIS — E66.01 MORBID (SEVERE) OBESITY DUE TO EXCESS CALORIES: ICD-10-CM

## 2023-09-18 DIAGNOSIS — I10 HYPERTENSION, ESSENTIAL: ICD-10-CM

## 2023-09-18 DIAGNOSIS — E55.9 VITAMIN D DEFICIENCY: ICD-10-CM

## 2023-09-18 DIAGNOSIS — G62.9 NEUROPATHY: Primary | ICD-10-CM

## 2023-09-18 DIAGNOSIS — E53.8 VITAMIN B12 DEFICIENCY: ICD-10-CM

## 2023-09-18 DIAGNOSIS — E03.9 ACQUIRED HYPOTHYROIDISM: ICD-10-CM

## 2023-09-18 NOTE — ASSESSMENT & PLAN NOTE
Patient is a little overweight.  I am worried with him losing weight because of feel he would not have enough metabolic reserves to find the infection.  We will continue to monitor.

## 2023-09-18 NOTE — ASSESSMENT & PLAN NOTE
Refill gabapentin.  House bill 1.  He now has bilateral carpal tunnel syndrome.  Will be seeing a hand surgeon about this.

## 2023-09-18 NOTE — PROGRESS NOTES
Patient Name: Kurt Houser  : 1940   MRN: [id number]     Chief Complaint:    Chief Complaint   Patient presents with    Follow-up       History of Present Illness: Kurt Houser is a 83 y.o. male who is here today for follow up on Neuropathy  HPI        Review of Systems:   Review of Systems   Constitutional: Negative.    HENT: Negative.     Eyes: Negative.    Respiratory: Negative.     Cardiovascular: Negative.    Gastrointestinal: Negative.    Neurological: Negative.       Past Medical History:   Past Medical History:   Diagnosis Date    Acute osteomyelitis of foot     Amputated toe of left foot     Anemia of decreased vitamin B12 absorption     BMI 27.0-27.9,adult     Degenerative joint disease involving multiple joints     Diverticular disease of colon     Drug therapy     Dyslipidemia     Essential hypertension     Foot osteomyelitis     Frequent PVCs     Glaucoma     High risk medication use     History of adenomatous polyp of colon     Long term current use of anticoagulant     Low density lipoprotein (LDL) cholesterol level below 100mg/dL     Mixed hyperlipidemia     Neuropathy     Osteoarthritis     Paroxysmal atrial fibrillation     Polyneuropathy     Prediabetes     Ventricular premature beats     Vitamin B12 deficiency     Vitamin D deficiency        Past Surgical History:   Past Surgical History:   Procedure Laterality Date    CATARACT EXTRACTION, BILATERAL      FOOT SURGERY Left     TRANS METATARSAL AMPUTATION  2020    5th metatarsal amputation/IV abx    WRIST SURGERY Left        Family History:   Family History   Problem Relation Age of Onset    Colon cancer Father     Liver cancer Brother        Social History:   Social History     Socioeconomic History    Marital status:    Tobacco Use    Smoking status: Former     Packs/day: 1.00     Years: 27.00     Pack years: 27.00     Types: Cigarettes, Cigars     Start date:      Quit date:      Years since quitting:  23.7    Smokeless tobacco: Never    Tobacco comments:     1 cigar/day, 2.40 years   Vaping Use    Vaping Use: Never used   Substance and Sexual Activity    Alcohol use: Not Currently     Comment: about every 1-2 years he jania have a drink    Drug use: Never    Sexual activity: Defer       Medications:     Current Outpatient Medications:     amLODIPine (NORVASC) 5 MG tablet, Take 1 tablet by mouth Daily., Disp: 90 tablet, Rfl: 1    Coenzyme Q10 (CoQ-10) 100 MG capsule, Take  by mouth Daily., Disp: , Rfl:     Cyanocobalamin (B-12) 1000 MCG capsule, Take 1,000 mg by mouth Daily., Disp: 90 capsule, Rfl: 1    gabapentin (NEURONTIN) 600 MG tablet, Take 2 tablets by mouth 3 (Three) Times a Day., Disp: 180 tablet, Rfl: 2    levothyroxine (SYNTHROID, LEVOTHROID) 25 MCG tablet, Take 1 tablet by mouth Daily., Disp: 90 tablet, Rfl: 1    lisinopril (PRINIVIL,ZESTRIL) 40 MG tablet, Take 1 tablet by mouth Daily., Disp: 90 tablet, Rfl: 1    Magnesium 250 MG tablet, Take 1 tablet by mouth Daily., Disp: , Rfl:     Omega-3 Fatty Acids (fish oil) 1000 MG capsule capsule, Take  by mouth Daily With Breakfast., Disp: , Rfl:     rosuvastatin (CRESTOR) 20 MG tablet, Take 1 tablet by mouth Daily., Disp: 90 tablet, Rfl: 1    vitamin D3 125 MCG (5000 UT) capsule capsule, Take 1 capsule by mouth once daily, Disp: 90 capsule, Rfl: 0    warfarin (COUMADIN) 2.5 MG tablet, TAKE 1 TO 2 TABLETS BY MOUTH ONCE DAILY OR  AS  DIRECTED  BY  ANTICOAGULATION  CLINIC,  TAKE  ALTERNATING  DAYS  OF  5  MG  TABLETS, Disp: 45 tablet, Rfl: 1    warfarin (COUMADIN) 5 MG tablet, Take 1 tablet by mouth once daily, Disp: 60 tablet, Rfl: 1    Allergies:   Allergies   Allergen Reactions    Acetaminophen Mental Status Change    Hydrocodone Bitart (Antituss) [Hydrocodone] Mental Status Change         Physical Exam:  Vital Signs:   Vitals:    09/18/23 1402   BP: 132/88   BP Location: Right arm   Patient Position: Sitting   Cuff Size: Adult   Pulse: 58   Resp: 16   SpO2:  "98%   Weight: 101 kg (222 lb)   Height: 188 cm (74.02\")   PainSc:   8   PainLoc: Generalized     Body mass index is 28.49 kg/m².     Physical Exam  Vitals and nursing note reviewed.   Constitutional:       Appearance: Normal appearance. He is normal weight.   HENT:      Head: Normocephalic and atraumatic.      Right Ear: Tympanic membrane, ear canal and external ear normal.      Left Ear: Tympanic membrane, ear canal and external ear normal.      Nose: Nose normal.      Mouth/Throat:      Mouth: Mucous membranes are dry.      Pharynx: Oropharynx is clear.   Eyes:      Extraocular Movements: Extraocular movements intact.      Conjunctiva/sclera: Conjunctivae normal.      Pupils: Pupils are equal, round, and reactive to light.   Cardiovascular:      Rate and Rhythm: Normal rate and regular rhythm.      Pulses: Normal pulses.      Heart sounds: Normal heart sounds.   Pulmonary:      Effort: Pulmonary effort is normal.      Breath sounds: Normal breath sounds.   Musculoskeletal:      Cervical back: Normal range of motion and neck supple.   Feet:      Comments:      Neurological:      Mental Status: He is alert.       Procedures      Assessment/Plan:   Diagnoses and all orders for this visit:    1. Neuropathy (Primary)  Assessment & Plan:  Refill gabapentin.  House bill 1.  He now has bilateral carpal tunnel syndrome.  Will be seeing a hand surgeon about this.    Orders:  -     Hemoglobin A1c; Future  -     Lipid Panel; Future  -     Comprehensive Metabolic Panel; Future  -     Vitamin B12; Future  -     TSH Rfx On Abnormal To Free T4; Future  -     CBC & Differential; Future    2. Abnormal gait  Assessment & Plan:  Patient saw Dr. Akins and is not going to have his knee worked on.  He is wearing a brace on his right leg.  He has not had any falls.  We will recheck in 3 months.    Orders:  -     Hemoglobin A1c; Future  -     Lipid Panel; Future  -     Comprehensive Metabolic Panel; Future  -     Vitamin B12; Future  -    "  TSH Rfx On Abnormal To Free T4; Future  -     CBC & Differential; Future    3. Hyperlipidemia, mixed  Assessment & Plan:  Blood work in 3 months    Orders:  -     Hemoglobin A1c; Future  -     Lipid Panel; Future  -     Comprehensive Metabolic Panel; Future  -     Vitamin B12; Future  -     TSH Rfx On Abnormal To Free T4; Future  -     CBC & Differential; Future    4. Hypertension, essential  Assessment & Plan:  Discussed with patient to monitor their blood pressure and if systolic blood pressure goes above 140 or diastolic is above 90 to return to clinic.  Take medicines as directed, call for any problems, patient not having or any worrisome symptoms.        Orders:  -     Hemoglobin A1c; Future  -     Lipid Panel; Future  -     Comprehensive Metabolic Panel; Future  -     Vitamin B12; Future  -     TSH Rfx On Abnormal To Free T4; Future  -     CBC & Differential; Future    5. Acquired hypothyroidism  Assessment & Plan:  Work in 3 months    Orders:  -     Hemoglobin A1c; Future  -     Lipid Panel; Future  -     Comprehensive Metabolic Panel; Future  -     Vitamin B12; Future  -     TSH Rfx On Abnormal To Free T4; Future  -     CBC & Differential; Future    6. Hyperglycemia  -     Hemoglobin A1c; Future  -     Lipid Panel; Future  -     Comprehensive Metabolic Panel; Future  -     Vitamin B12; Future  -     TSH Rfx On Abnormal To Free T4; Future  -     CBC & Differential; Future    7. Vitamin B12 deficiency  -     Hemoglobin A1c; Future  -     Lipid Panel; Future  -     Comprehensive Metabolic Panel; Future  -     Vitamin B12; Future  -     TSH Rfx On Abnormal To Free T4; Future  -     CBC & Differential; Future    8. Vitamin D deficiency  -     Hemoglobin A1c; Future  -     Lipid Panel; Future  -     Comprehensive Metabolic Panel; Future  -     Vitamin B12; Future  -     TSH Rfx On Abnormal To Free T4; Future  -     CBC & Differential; Future    9. Morbid (severe) obesity due to excess calories  Assessment &  Plan:  Patient is a little overweight.  I am worried with him losing weight because of feel he would not have enough metabolic reserves to find the infection.  We will continue to monitor.    Orders:  -     Hemoglobin A1c; Future  -     Lipid Panel; Future  -     Comprehensive Metabolic Panel; Future  -     Vitamin B12; Future  -     TSH Rfx On Abnormal To Free T4; Future  -     CBC & Differential; Future             Follow Up:   Return in about 3 months (around 12/18/2023) for Annual physical, Bloodwork 1 week prior to next appointment.    Leo Cordova MD  [facility]

## 2023-09-18 NOTE — ASSESSMENT & PLAN NOTE
Patient saw Dr. Akins and is not going to have his knee worked on.  He is wearing a brace on his right leg.  He has not had any falls.  We will recheck in 3 months.

## 2023-09-21 ENCOUNTER — CLINICAL SUPPORT (OUTPATIENT)
Dept: CARDIOLOGY | Facility: CLINIC | Age: 83
End: 2023-09-21
Payer: MEDICARE

## 2023-09-21 ENCOUNTER — ANTICOAGULATION VISIT (OUTPATIENT)
Dept: PHARMACY | Facility: HOSPITAL | Age: 83
End: 2023-09-21
Payer: MEDICARE

## 2023-09-21 DIAGNOSIS — I48.20 ATRIAL FIBRILLATION, CHRONIC: Primary | ICD-10-CM

## 2023-09-21 LAB — INR PPP: 1.9 (ref 0.9–1.1)

## 2023-09-21 NOTE — PROGRESS NOTES
Capillary Blood Specimen Collection  Capillary blood collection performed in clinic by Ladi Dover MA. Patient tolerated the procedure well without complications.   09/21/23   Ladi Dover MA

## 2023-09-22 NOTE — PROGRESS NOTES
Anticoagulation Clinic - Remote Progress Note  Remote Lab    Indication: paroxysmal afib  Referring Provider: Buddy Javier  Initial Warfarin Start Date:   Goal INR: 2.0-3.0  Current Drug Interactions: omega three fatty acid; CoQ-10   [id number]: 3 (HTN, Age)    Diet: ~ 2 Tablespoon QOD mixed cooked greens; green beans 2x weekly 2/20/2023  Alcohol: None  Tobacco: None  OTC Pain Medication: APAP PRN    INR History:  Date 3/23 3/30 4/8 4/16 4/23 5/7 6/4 6/18 7/2 7/19 7/28 8/11   Total Weekly Dose 27.5mg 25mg 25mg 25mg 25mg 25mg 25mg 25mg 25mg 25mg 25mg 25mg   INR 3.2 2.7 3.2 2.0 2.4 2.7 1.8 1.8 2.3 1.9 1.9 2.6   Notes   LD doxy 4/8    incr GLV  decr GLV incr GLV  incr GLV decr GLV     Date 8/25 9/22 10/20 11/18 11/29 12/28  3/2/22 3/1/ 4/5/22 4/11 4/27   Total WeeklyDose 25mg 25mg 25mg 25mg 25mg 25mg non- compliant 25mg 25 mg 25 mg 25mg 25 mg   INR 2.1 2.8 2.8 3.1 2.4 2.0  2.9 3.2 3.5 2.5 2.8   Notes    decr GLV    APAP  decr GLV       Date 5/31 6/28 7/26 8/25 9/8 10/6 10/20 11/10 11/30 1/4 2/2   Total Weekly Dose 25 mg 25 mg 25 mg 25 mg 25 mg 25 mg 25mg 25mg 25 mg 25 mg 25mg   INR 2.9 2.8 2.6 3.5 2.9 3.5 2.5 2.6 2.7 3.0 3.6   Notes                Date 2/17  3/16 4/17 5/1 5/30 6/23  7/25 8/8 08/22 9/21    Total Weekly Dose 25mg  25 mg 25 mg  25 mg  25 mg 25mg  25 mg 22.5mg 22.5 mg 22.5 mg    INR 2.7 3.3 3.5 2.4 3.1 2.9  4.0 2.5 2.9 1.9    Notes          Inc glv      Phone Interview:  Tablet Strength: 5mg; 2.5mg  Patient Contact Info: [phone] (Mobile)  Estimated OOP cost: will send if patient comes to [place]   Verbal Release Auth:   Lab Contact Info: [facility] PO[facility]    Patient Findings  Positives: Change in diet/appetite   Negatives: Signs/symptoms of thrombosis, Signs/symptoms of bleeding, Laboratory test error suspected, Change in health, Change in alcohol use, Change in activity, Upcoming invasive procedure, Emergency department visit, Upcoming dental procedure, Missed doses, Extra doses, Change in  "medications, Hospital admission, Bruising, Other complaints   Comments: Patient had a \" big bowl of greens \" two days before rechecking his INR    All other findings negative per patient     Plan:  1. INR is therapeutic at 1.9 (goal 2.0-3.0). Per Laura Ames, PharmD Instructed Mr. Houser to BOOST tonight's dose of warfarin to 5 mg then continue maintenance regimen of warfarin 2.5 mg oral daily except 5 mg WedSat until recheck (22.5 mg/week)   2. Repeat INR in two weeks on 10/5/23 per patient preference   3. Verbal information provided over the phone. Kurt Houser RBV dosing instructions, expresses understanding by teach back, and has no further questions at this time.      Aren Muse, Pharmacy Technician  9/22/2023 10:35 EDT    I, Laura Ames, PharmD, have reviewed the note in full and agree with the assessment and plan.  09/22/23  10:59 EDT         "

## 2023-10-06 ENCOUNTER — CLINICAL SUPPORT (OUTPATIENT)
Dept: CARDIOLOGY | Facility: CLINIC | Age: 83
End: 2023-10-06
Payer: MEDICARE

## 2023-10-06 ENCOUNTER — ANTICOAGULATION VISIT (OUTPATIENT)
Dept: PHARMACY | Facility: HOSPITAL | Age: 83
End: 2023-10-06
Payer: MEDICARE

## 2023-10-06 DIAGNOSIS — I48.20 ATRIAL FIBRILLATION, CHRONIC: Primary | ICD-10-CM

## 2023-10-06 LAB — INR PPP: 2.6 (ref 0.9–1.1)

## 2023-10-06 NOTE — PROGRESS NOTES
Capillary Blood Specimen Collection  Capillary blood collection performed in clinic   by Jossie Naqvi RN. Patient tolerated the procedure well without complications.   10/06/23   Jossie Naqvi RN

## 2023-10-06 NOTE — PROGRESS NOTES
Anticoagulation Clinic - Remote Progress Note  Remote Lab    Indication: paroxysmal afib  Referring Provider: Buddy Javier  Initial Warfarin Start Date:   Goal INR: 2.0-3.0  Current Drug Interactions: omega three fatty acid; CoQ-10   [id number]: 3 (HTN, Age)    Diet: ~ 2 Tablespoon QOD mixed cooked greens; green beans 2x weekly 2/20/2023  Alcohol: None  Tobacco: None  OTC Pain Medication: APAP PRN    INR History:  Date 3/23 3/30 4/8 4/16 4/23 5/7 6/4 6/18 7/2 7/19 7/28 8/11   Total Weekly Dose 27.5mg 25mg 25mg 25mg 25mg 25mg 25mg 25mg 25mg 25mg 25mg 25mg   INR 3.2 2.7 3.2 2.0 2.4 2.7 1.8 1.8 2.3 1.9 1.9 2.6   Notes   LD doxy 4/8    incr GLV  decr GLV incr GLV  incr GLV decr GLV     Date 8/25 9/22 10/20 11/18 11/29 12/28  3/2/22 3/1/ 4/5/22 4/11 4/27   Total WeeklyDose 25mg 25mg 25mg 25mg 25mg 25mg non- compliant 25mg 25 mg 25 mg 25mg 25 mg   INR 2.1 2.8 2.8 3.1 2.4 2.0  2.9 3.2 3.5 2.5 2.8   Notes    decr GLV    APAP  decr GLV       Date 5/31 6/28 7/26 8/25 9/8 10/6 10/20 11/10 11/30 1/4 2/2   Total Weekly Dose 25 mg 25 mg 25 mg 25 mg 25 mg 25 mg 25mg 25mg 25 mg 25 mg 25mg   INR 2.9 2.8 2.6 3.5 2.9 3.5 2.5 2.6 2.7 3.0 3.6   Notes                Date 2/17  3/16 4/17 5/1 5/30 6/23  7/25 8/8 08/22 9/21 10/06   Total Weekly Dose 25mg  25 mg 25 mg  25 mg  25 mg 25mg  25 mg 22.5mg 22.5 mg 22.5 mg 22.5 mg    INR 2.7 3.3 3.5 2.4 3.1 2.9  4.0 2.5 2.9 1.9 2.6   Notes          Inc glv      Phone Interview:  Tablet Strength: 5mg; 2.5mg  Patient Contact Info: [phone] (Mobile)  Estimated OOP cost: will send if patient comes to [place]   Verbal Release Auth:   Lab Contact Info: [facility] PO[facility]    Patient Findings  Negatives: Signs/symptoms of thrombosis, Signs/symptoms of bleeding, Laboratory test error suspected, Change in health, Change in alcohol use, Change in activity, Upcoming invasive procedure, Emergency department visit, Upcoming dental procedure, Missed doses, Extra doses, Change in medications, Change  in diet/appetite, Hospital admission, Bruising, Other complaints   Comments: Pt denies any s/s of bleeding, bruising, or thrombotic event. Denies changes to medications or diet and has not missed or taken any additional warfarin than instructed since last check. No upcoming procedures.       Plan:  1. INR is therapeutic at 2.6 (goal 2.0-3.0). Instructed Mr. Houser to continue maintenance regimen of warfarin 2.5 mg oral daily except 5 mg WedSat until recheck (22.5 mg/week)   2. Repeat INR in two weeks on 10/20/23, if pt is in range next check consider pushing out further.   3. Verbal information provided over the phone. Kurt Houser RBV dosing instructions, expresses understanding by teach back, and has no further questions at this time.    Freda Ghosh, Pharmacy Intern  10/09/23  10:11 EDT       I, Regine Adame, PharmD, have reviewed the note in full and agree with the assessment and plan.  10/09/23  15:51 EDT

## 2023-10-23 ENCOUNTER — CLINICAL SUPPORT (OUTPATIENT)
Dept: CARDIOLOGY | Facility: CLINIC | Age: 83
End: 2023-10-23
Payer: MEDICARE

## 2023-10-23 ENCOUNTER — ANTICOAGULATION VISIT (OUTPATIENT)
Dept: PHARMACY | Facility: HOSPITAL | Age: 83
End: 2023-10-23
Payer: MEDICARE

## 2023-10-23 DIAGNOSIS — Z79.01 LONG TERM (CURRENT) USE OF ANTICOAGULANTS: ICD-10-CM

## 2023-10-23 DIAGNOSIS — I48.20 ATRIAL FIBRILLATION, CHRONIC: Primary | ICD-10-CM

## 2023-10-23 LAB
INR PPP: 3.4 (ref 0.9–1.1)
PROTHROMBIN TIME: 3.4 SECONDS

## 2023-10-23 PROCEDURE — 36416 COLLJ CAPILLARY BLOOD SPEC: CPT | Performed by: INTERNAL MEDICINE

## 2023-10-23 NOTE — PROGRESS NOTES
Capillary Blood Specimen Collection  Capillary blood collection performed in clinic by Dinah العراقي MA. Patient tolerated the procedure well without complications.   10/23/23   Dinah العراقي MA

## 2023-10-23 NOTE — PROGRESS NOTES
Anticoagulation Clinic - Remote Progress Note  Remote Lab    Indication: paroxysmal afib  Referring Provider: Buddy Javier  Initial Warfarin Start Date:   Goal INR: 2.0-3.0  Current Drug Interactions: omega three fatty acid; CoQ-10   [id number]: 3 (HTN, Age)    Diet: ~ 2 Tablespoon QOD mixed cooked greens; green beans 2x weekly 2/20/2023  Alcohol: None  Tobacco: None  OTC Pain Medication: APAP PRN    INR History:  Date 3/23 3/30 4/8 4/16 4/23 5/7 6/4 6/18 7/2 7/19 7/28 8/11   Total Weekly Dose 27.5mg 25mg 25mg 25mg 25mg 25mg 25mg 25mg 25mg 25mg 25mg 25mg   INR 3.2 2.7 3.2 2.0 2.4 2.7 1.8 1.8 2.3 1.9 1.9 2.6   Notes   LD doxy 4/8    incr GLV  decr GLV incr GLV  incr GLV decr GLV     Date 8/25 9/22 10/20 11/18 11/29 12/28  3/2/22 3/1/ 4/5/22 4/11 4/27   Total WeeklyDose 25mg 25mg 25mg 25mg 25mg 25mg non- compliant 25mg 25 mg 25 mg 25mg 25 mg   INR 2.1 2.8 2.8 3.1 2.4 2.0  2.9 3.2 3.5 2.5 2.8   Notes    decr GLV    APAP  decr GLV       Date 5/31 6/28 7/26 8/25 9/8 10/6 10/20 11/10 11/30 1/4 2/2   Total Weekly Dose 25 mg 25 mg 25 mg 25 mg 25 mg 25 mg 25mg 25mg 25 mg 25 mg 25mg   INR 2.9 2.8 2.6 3.5 2.9 3.5 2.5 2.6 2.7 3.0 3.6   Notes                Date 2/17  3/16 4/17 5/1 5/30 6/23  7/25 8/8 08/22 9/21 10/06   Total Weekly Dose 25mg  25 mg 25 mg  25 mg  25 mg 25mg  25 mg 22.5mg 22.5 mg 22.5 mg 22.5 mg    INR 2.7 3.3 3.5 2.4 3.1 2.9  4.0 2.5 2.9 1.9 2.6   Notes          Inc glv      Date 10/23             Total Weekly Dose 22.5 mg              INR 3.4             Notes APAP  Rec'd 10/24               Phone Interview:  Tablet Strength: 5mg; 2.5mg  Patient Contact Info: [phone] (Mobile)  Estimated OOP cost: will send if patient comes to [place]   Verbal Release Auth:   Lab Contact Info: [facility] PO[facility]    Patient Findings  Positives: Change in medications   Negatives: Signs/symptoms of thrombosis, Signs/symptoms of bleeding, Laboratory test error suspected, Change in health, Change in alcohol use, Change  in activity, Upcoming invasive procedure, Emergency department visit, Upcoming dental procedure, Missed doses, Extra doses, Change in diet/appetite, Hospital admission, Bruising, Other complaints   Comments: APAP 650 mg two/three days this past weeks, going to hand specialist this upcoming week. Reminded pt to let us know if any procedures are scheduled. No concerns with bleeding or bruising. Verified past 7 day dosing. Pt took his dose last night 2.5 mg         Plan:  1. INR is SUPRAtherapeutic yesterday at 3.4 (goal 2.0-3.0). Instructed Mr. Houser to REDUCE tonight's dose to 1.25 mg then continue maintenance regimen of warfarin 2.5 mg oral daily except 5 mg WedSat until recheck (21.25 mg/week)   2. Repeat INR on 10/30/23, if pt is in range next check consider pushing out further.   3. Verbal information provided over the phone. Kurt Houser RBV dosing instructions, expresses understanding by teach back, and has no further questions at this time.    Freda Ghosh, Pharmacy Intern  10/24/23  10:37 EDT       I, Romana Singletary, [facility], have reviewed the note in full and agree with the assessment and plan.  10/24/23  15:14 EDT

## 2023-10-30 ENCOUNTER — ANTICOAGULATION VISIT (OUTPATIENT)
Dept: PHARMACY | Facility: HOSPITAL | Age: 83
End: 2023-10-30
Payer: MEDICARE

## 2023-10-30 ENCOUNTER — CLINICAL SUPPORT (OUTPATIENT)
Dept: CARDIOLOGY | Facility: CLINIC | Age: 83
End: 2023-10-30
Payer: MEDICARE

## 2023-10-30 DIAGNOSIS — I48.20 ATRIAL FIBRILLATION, CHRONIC: Primary | ICD-10-CM

## 2023-10-30 DIAGNOSIS — Z79.01 LONG TERM (CURRENT) USE OF ANTICOAGULANTS: ICD-10-CM

## 2023-10-30 LAB
INR PPP: 2.6 (ref 0.9–1.1)
PROTHROMBIN TIME: 2.6 SECONDS

## 2023-10-30 PROCEDURE — 36416 COLLJ CAPILLARY BLOOD SPEC: CPT | Performed by: INTERNAL MEDICINE

## 2023-10-30 NOTE — PROGRESS NOTES
Capillary Blood Specimen Collection  Capillary blood collection performed in clinic by Dinah العراقي MA. Patient tolerated the procedure well without complications.   10/30/23   Dinah العراقي MA

## 2023-10-31 NOTE — PROGRESS NOTES
Anticoagulation Clinic - Remote Progress Note  Remote Lab    Indication: paroxysmal afib  Referring Provider: Buddy Javier  Initial Warfarin Start Date:   Goal INR: 2.0-3.0  Current Drug Interactions: omega three fatty acid; CoQ-10   [id number]: 3 (HTN, Age)    Diet: ~ 2 Tablespoon QOD mixed cooked greens; green beans 2x weekly 2/20/2023  Alcohol: None  Tobacco: None  OTC Pain Medication: APAP PRN    INR History:  Date 3/23 3/30 4/8 4/16 4/23 5/7 6/4 6/18 7/2 7/19 7/28 8/11   Total Weekly Dose 27.5mg 25mg 25mg 25mg 25mg 25mg 25mg 25mg 25mg 25mg 25mg 25mg   INR 3.2 2.7 3.2 2.0 2.4 2.7 1.8 1.8 2.3 1.9 1.9 2.6   Notes   LD doxy 4/8    incr GLV  decr GLV incr GLV  incr GLV decr GLV     Date 8/25 9/22 10/20 11/18 11/29 12/28  3/2/22 3/1/ 4/5/22 4/11 4/27   Total WeeklyDose 25mg 25mg 25mg 25mg 25mg 25mg non- compliant 25mg 25 mg 25 mg 25mg 25 mg   INR 2.1 2.8 2.8 3.1 2.4 2.0  2.9 3.2 3.5 2.5 2.8   Notes    decr GLV    APAP  decr GLV       Date 5/31 6/28 7/26 8/25 9/8 10/6 10/20 11/10 11/30 1/4 2/2   Total Weekly Dose 25 mg 25 mg 25 mg 25 mg 25 mg 25 mg 25mg 25mg 25 mg 25 mg 25mg   INR 2.9 2.8 2.6 3.5 2.9 3.5 2.5 2.6 2.7 3.0 3.6   Notes                Date 2/17  3/16 4/17 5/1 5/30 6/23  7/25 8/8 08/22 9/21 10/06   Total Weekly Dose 25mg  25 mg 25 mg  25 mg  25 mg 25mg  25 mg 22.5mg 22.5 mg 22.5 mg 22.5 mg    INR 2.7 3.3 3.5 2.4 3.1 2.9  4.0 2.5 2.9 1.9 2.6   Notes          Inc glv      Date 10/23 10/30            Total Weekly Dose 22.5 mg  21.25mg            INR 3.4 2.6            Notes APAP  Rec'd 10/24 Spoke 10/31              Phone Interview:  Tablet Strength: 5mg; 2.5mg  Patient Contact Info: [phone] (Mobile)  Estimated OOP cost: will send if patient comes to [place]   Verbal Release Auth:   Lab Contact Info: [facility] PO[facility]    Patient Findings  Positives: Change in medications   Negatives: Signs/symptoms of thrombosis, Signs/symptoms of bleeding, Laboratory test error suspected, Change in health,  Change in alcohol use, Change in activity, Upcoming invasive procedure, Emergency department visit, Upcoming dental procedure, Missed doses, Extra doses, Change in diet/appetite, Hospital admission, Bruising, Other complaints   Comments: APAP 650 mg two/three days this past weeks, going to hand specialist this upcoming week. Reminded pt to let us know if any procedures are scheduled. No concerns with bleeding or bruising. Verified past 7 day dosing. Pt took his dose last night 2.5 mg     Plan:  1. INR is therapeutic yesterday at 2.6 (goal 2.0-3.0). Instructed Mr. Houser to continue maintenance regimen of warfarin 2.5 mg oral daily except 5 mg WedSat until recheck (21.25 mg/week)   2. Repeat INR on 11/9/2023.  3. Verbal information provided over the phone. Kurt Houser RBV dosing instructions, expresses understanding by teach back, and has no further questions at this time.    Laura Ames, PharmD  10/31/23  10:10 EDT

## 2023-11-07 RX ORDER — ROSUVASTATIN CALCIUM 20 MG/1
20 TABLET, COATED ORAL DAILY
Qty: 30 TABLET | Refills: 0 | Status: SHIPPED | OUTPATIENT
Start: 2023-11-07

## 2023-11-15 ENCOUNTER — CLINICAL SUPPORT (OUTPATIENT)
Dept: CARDIOLOGY | Facility: CLINIC | Age: 83
End: 2023-11-15
Payer: MEDICARE

## 2023-11-15 ENCOUNTER — ANTICOAGULATION VISIT (OUTPATIENT)
Dept: PHARMACY | Facility: HOSPITAL | Age: 83
End: 2023-11-15
Payer: MEDICARE

## 2023-11-15 DIAGNOSIS — I48.0 PAF (PAROXYSMAL ATRIAL FIBRILLATION): ICD-10-CM

## 2023-11-15 DIAGNOSIS — I48.20 ATRIAL FIBRILLATION, CHRONIC: Primary | ICD-10-CM

## 2023-11-15 DIAGNOSIS — Z79.01 LONG TERM (CURRENT) USE OF ANTICOAGULANTS: ICD-10-CM

## 2023-11-15 LAB — INR PPP: 2.4 (ref 0.9–1.1)

## 2023-11-15 NOTE — PROGRESS NOTES
Capillary Blood Specimen Collection  Capillary blood collection performed in clinic by Ladi Dover MA. Patient tolerated the procedure well without complications.   11/15/23   Ladi Dover MA

## 2023-11-16 ENCOUNTER — ANTICOAGULATION VISIT (OUTPATIENT)
Dept: PHARMACY | Facility: HOSPITAL | Age: 83
End: 2023-11-16
Payer: MEDICARE

## 2023-11-16 NOTE — PROGRESS NOTES
Anticoagulation Clinic - Remote Progress Note  Remote Lab     Indication: paroxysmal afib  Referring Provider: Buddy Javier  Initial Warfarin Start Date:   Goal INR: 2.0-3.0  Current Drug Interactions: omega three fatty acid; CoQ-10   [id number]: 3 (HTN, Age)     Diet: ~ 2 Tablespoon QOD mixed cooked greens; green beans 2x weekly 2/20/2023  Alcohol: None  Tobacco: None  OTC Pain Medication: APAP PRN     INR History:  Date 3/23 3/30 4/8 4/16 4/23 5/7 6/4 6/18 7/2 7/19 7/28 8/11   Total Weekly Dose 27.5mg 25mg 25mg 25mg 25mg 25mg 25mg 25mg 25mg 25mg 25mg 25mg   INR 3.2 2.7 3.2 2.0 2.4 2.7 1.8 1.8 2.3 1.9 1.9 2.6   Notes     LD doxy 4/8       incr GLV   decr GLV incr GLV  incr GLV decr GLV      Date 8/25 9/22 10/20 11/18 11/29 12/28   3/2/22 3/1/ 4/5/22 4/11 4/27   Total WeeklyDose 25mg 25mg 25mg 25mg 25mg 25mg non- compliant 25mg 25 mg 25 mg 25mg 25 mg   INR 2.1 2.8 2.8 3.1 2.4 2.0   2.9 3.2 3.5 2.5 2.8   Notes       decr GLV       APAP   decr GLV          Date 5/31 6/28 7/26 8/25 9/8 10/6 10/20 11/10 11/30 1/4 2/2   Total Weekly Dose 25 mg 25 mg 25 mg 25 mg 25 mg 25 mg 25mg 25mg 25 mg 25 mg 25mg   INR 2.9 2.8 2.6 3.5 2.9 3.5 2.5 2.6 2.7 3.0 3.6   Notes                            Date 2/17  3/16 4/17 5/1 5/30 6/23  7/25 8/8 08/22 9/21 10/06   Total Weekly Dose 25mg  25 mg 25 mg  25 mg  25 mg 25mg  25 mg 22.5mg 22.5 mg 22.5 mg 22.5 mg    INR 2.7 3.3 3.5 2.4 3.1 2.9  4.0 2.5 2.9 1.9 2.6   Notes                   Inc glv        Date 10/23 10/30 11/15                   Total Weekly Dose 22.5 mg  21.25mg 22.5 mg                   INR 3.4 2.6 2.4                   Notes APAP  Rec'd 10/24 Spoke 10/31  Spoke 11/16                      Phone Interview:  Tablet Strength: 5mg; 2.5mg  Patient Contact Info: [phone] (Mobile)  Estimated OOP cost: will send if patient comes to [place]   Verbal Release Auth:   Lab Contact Info: [facility] PO[facility]     Patient Findings  Negatives: Signs/symptoms of thrombosis,  Signs/symptoms of bleeding, Laboratory test error suspected, Change in health, Change in alcohol use, Change in activity, Upcoming invasive procedure, Emergency department visit, Upcoming dental procedure, Missed doses, Extra doses, Change in medications, Change in diet/appetite, Hospital admission, Bruising, Other complaints   Comments: Vitamins e, b6 for 2-3 weeks.  All other findings negative per patient.     Plan:  1. INR is therapeutic yesterday at 2.4 (goal 2.0-3.0). Instructed Mr. Houser to continue maintenance regimen of warfarin 2.5 mg oral daily except 5 mg WedSat until recheck (21.25 mg/week)   2. Repeat INR on 11/30/2023.  3. Verbal information provided over the phone. Kurt Houser RBV dosing instructions, expresses understanding by teach back, and has no further questions at this time.     Randall Martínez  CPMARLA  11/16/23      I, Laura Ames, PharmD, have reviewed the note in full and agree with the assessment and plan.  11/16/23  14:50 EST

## 2023-11-17 DIAGNOSIS — G62.9 NEUROPATHY: ICD-10-CM

## 2023-11-20 RX ORDER — GABAPENTIN 600 MG/1
1200 TABLET ORAL 3 TIMES DAILY
Qty: 180 TABLET | Refills: 0 | Status: SHIPPED | OUTPATIENT
Start: 2023-11-20

## 2023-11-30 ENCOUNTER — OFFICE VISIT (OUTPATIENT)
Dept: CARDIOLOGY | Facility: CLINIC | Age: 83
End: 2023-11-30
Payer: MEDICARE

## 2023-11-30 ENCOUNTER — ANTICOAGULATION VISIT (OUTPATIENT)
Dept: PHARMACY | Facility: HOSPITAL | Age: 83
End: 2023-11-30
Payer: MEDICARE

## 2023-11-30 VITALS
HEIGHT: 74 IN | RESPIRATION RATE: 18 BRPM | TEMPERATURE: 98 F | HEART RATE: 76 BPM | DIASTOLIC BLOOD PRESSURE: 82 MMHG | WEIGHT: 226 LBS | BODY MASS INDEX: 29 KG/M2 | SYSTOLIC BLOOD PRESSURE: 136 MMHG | OXYGEN SATURATION: 94 %

## 2023-11-30 DIAGNOSIS — I48.20 ATRIAL FIBRILLATION, CHRONIC: Primary | ICD-10-CM

## 2023-11-30 DIAGNOSIS — I48.0 PAF (PAROXYSMAL ATRIAL FIBRILLATION): Primary | ICD-10-CM

## 2023-11-30 DIAGNOSIS — I10 HYPERTENSION, ESSENTIAL: ICD-10-CM

## 2023-11-30 DIAGNOSIS — E78.2 HYPERLIPIDEMIA, MIXED: ICD-10-CM

## 2023-11-30 LAB — INR PPP: 3.2 (ref 0.9–1.1)

## 2023-11-30 NOTE — PROGRESS NOTES
Anticoagulation Clinic - Remote Progress Note  Remote Lab     Indication: paroxysmal afib  Referring Provider: Buddy Javier  Initial Warfarin Start Date:   Goal INR: 2.0-3.0  Current Drug Interactions: omega three fatty acid; CoQ-10   [id number]: 3 (HTN, Age)     Diet: ~ 2 Tablespoon QOD mixed cooked greens; green beans 2x weekly 2/20/2023  Alcohol: None  Tobacco: None  OTC Pain Medication: APAP PRN     INR History:  Date 3/23 3/30 4/8 4/16 4/23 5/7 6/4 6/18 7/2 7/19 7/28 8/11   Total Weekly Dose 27.5mg 25mg 25mg 25mg 25mg 25mg 25mg 25mg 25mg 25mg 25mg 25mg   INR 3.2 2.7 3.2 2.0 2.4 2.7 1.8 1.8 2.3 1.9 1.9 2.6   Notes     LD doxy 4/8       incr GLV   decr GLV incr GLV  incr GLV decr GLV      Date 8/25 9/22 10/20 11/18 11/29 12/28   3/2/22 3/1/ 4/5/22 4/11 4/27   Total WeeklyDose 25mg 25mg 25mg 25mg 25mg 25mg non- compliant 25mg 25 mg 25 mg 25mg 25 mg   INR 2.1 2.8 2.8 3.1 2.4 2.0   2.9 3.2 3.5 2.5 2.8   Notes       decr GLV       APAP   decr GLV          Date 5/31 6/28 7/26 8/25 9/8 10/6 10/20 11/10 11/30 1/4 2/2   Total Weekly Dose 25 mg 25 mg 25 mg 25 mg 25 mg 25 mg 25mg 25mg 25 mg 25 mg 25mg   INR 2.9 2.8 2.6 3.5 2.9 3.5 2.5 2.6 2.7 3.0 3.6   Notes                            Date 2/17  3/16 4/17 5/1 5/30 6/23  7/25 8/8 08/22 9/21 10/06   Total Weekly Dose 25mg  25 mg 25 mg  25 mg  25 mg 25mg  25 mg 22.5mg 22.5 mg 22.5 mg 22.5 mg    INR 2.7 3.3 3.5 2.4 3.1 2.9  4.0 2.5 2.9 1.9 2.6   Notes                   Inc glv        Date 10/23 10/30 11/15  11/30                 Total Weekly Dose 22.5 mg  21.25mg 22.5 mg  22.5 mg                 INR 3.4 2.6 2.4  3.2                 Notes APAP  Rec'd 10/24 Spoke 10/31  Spoke 11/16  APAP                    Phone Interview:  Tablet Strength: 5mg; 2.5mg  Patient Contact Info: [phone] (Mobile)  Estimated OOP cost: will send if patient comes to [place]   Verbal Release Auth:   Lab Contact Info: [facility] PO[facility]     Patient Findings  Positives: Change in medications,  "Change in diet/appetite   Negatives: Signs/symptoms of thrombosis, Signs/symptoms of bleeding, Laboratory test error suspected, Change in health, Change in alcohol use, Change in activity, Upcoming invasive procedure, Emergency department visit, Upcoming dental procedure, Missed doses, Extra doses, Hospital admission, Bruising, Other complaints   Comments: Has been taking 2x Tylenol Arthritis 2-3x weekly, did take dose today before getting. Diet may have changed for Thanksgiving \"I can't remember what all I ate\".       Plan:  1. INR is supratherapeutic today at 3.2 (goal 2.0-3.0). Instructed Mr. Houser to continue maintenance regimen of warfarin 2.5 mg oral daily except 5 mg WedSat until recheck (21.25 mg/week)   2. Repeat INR in two weeks on 12/15 with labs for Dr Cordova. Have entered standing order for patient to have this done. Instructed patient to ask to make sure INR is being drawn in addition to other labs. Also instructed patient to continue above directions if we do not call back with lab result Friday afternoon, unsure of processing time for lab result.  3. Verbal information provided over the phone. Kurt Houser RBV dosing instructions, expresses understanding by teach back, and has no further questions at this time.     Harman Salas, PharmD, BCPS  11/30/2023  16:23 EST       "

## 2023-11-30 NOTE — PROGRESS NOTES
Capillary Blood Specimen Collection  Capillary blood collection performed in clinic by Arti Chin MA. Patient tolerated the procedure well without complications.   11/30/23   Arti Chin MA

## 2023-11-30 NOTE — PROGRESS NOTES
[facility]  [facility]  [address] [place] [address]  Dept: [phone]  Dept Fax: [phone]    Kurt Houser  1940    Follow Up Office Visit Note    History of Present Illness:  Kurt Houser is a 83 y.o. male who presents to the clinic for Follow-up.Paroxysmal atrial fibrillation- He is asymptomatic today in sinus just anticoagulation Warfarin INR is 3,2    The following portions of the patient's history were reviewed and updated as appropriate: allergies, current medications, past family history, past medical history, past social history, past surgical history, and problem list.    Medications:  amLODIPine  B-12 capsule  CoQ-10 capsule  fish oil capsule  gabapentin  levothyroxine  lisinopril  Magnesium tablet  rosuvastatin  vitamin D3 capsule  warfarin    Subjective  Allergies   Allergen Reactions    Acetaminophen Mental Status Change    Hydrocodone Bitart (Antituss) [Hydrocodone] Mental Status Change        Past Medical History:   Diagnosis Date    Acute osteomyelitis of foot     Amputated toe of left foot     Anemia of decreased vitamin B12 absorption     BMI 27.0-27.9,adult     Degenerative joint disease involving multiple joints     Diverticular disease of colon     Drug therapy     Dyslipidemia     Essential hypertension     Foot osteomyelitis     Frequent PVCs     Glaucoma     High risk medication use     History of adenomatous polyp of colon     Long term current use of anticoagulant     Low density lipoprotein (LDL) cholesterol level below 100mg/dL     Mixed hyperlipidemia     Neuropathy     Osteoarthritis     Paroxysmal atrial fibrillation     Polyneuropathy     Prediabetes     Ventricular premature beats     Vitamin B12 deficiency     Vitamin D deficiency        Past Surgical History:   Procedure Laterality Date    CATARACT EXTRACTION, BILATERAL  2010    FOOT SURGERY Left     TRANS METATARSAL AMPUTATION  06/03/2020    5th metatarsal amputation/IV  "abx    WRIST SURGERY Left        Family History   Problem Relation Age of Onset    Colon cancer Father     Liver cancer Brother         Social History     Socioeconomic History    Marital status:    Tobacco Use    Smoking status: Former     Packs/day: 1.00     Years: 27.00     Additional pack years: 0.00     Total pack years: 27.00     Types: Cigarettes, Cigars     Start date:      Quit date:      Years since quittin.9    Smokeless tobacco: Never    Tobacco comments:     1 cigar/day, 2.40 years   Vaping Use    Vaping Use: Never used   Substance and Sexual Activity    Alcohol use: Not Currently     Comment: about every 1-2 years he mighst have a drink    Drug use: Never    Sexual activity: Defer       Review of Systems   Constitutional: Negative.    HENT: Negative.     Respiratory: Negative.     Cardiovascular: Negative.    Endocrine: Negative.    Genitourinary: Negative.    Musculoskeletal: Negative.    Skin: Negative.    Allergic/Immunologic: Negative.    Neurological: Negative.    Hematological: Negative.    Psychiatric/Behavioral: Negative.         Cardiovascular Procedures    ECHO/MUGA:  STRESS TESTS:   CARDIAC CATH:   DEVICES:   HOLTER:   CT/MRI:   VASCULAR:   CARDIOTHORACIC:     Objective  Vitals:    23 1322   BP: 136/82   BP Location: Right arm   Patient Position: Lying   Cuff Size: Adult   Pulse: 76   Resp: 18   Temp: 98 °F (36.7 °C)   TempSrc: Infrared   SpO2: 94%   Weight: 103 kg (226 lb)   Height: 188 cm (74.02\")   PainSc: 0-No pain     Body mass index is 29 kg/m².     Physical Exam  Vitals reviewed.   Constitutional:       Appearance: Healthy appearance. Not in distress.   Eyes:      Pupils: Pupils are equal, round, and reactive to light.   HENT:    Mouth/Throat:      Pharynx: Oropharynx is clear.   Neck:      Thyroid: Thyroid normal.      Vascular: No JVR. JVD normal.   Pulmonary:      Effort: Pulmonary effort is normal.      Breath sounds: Normal breath sounds. No wheezing. No " rhonchi. No rales.   Chest:      Chest wall: Not tender to palpatation.   Cardiovascular:      PMI at left midclavicular line. Normal rate. Regular rhythm. Normal S1. Normal S2.       Murmurs: There is no murmur.      No gallop.  No click. No rub.   Pulses:     Intact distal pulses.      Carotid: 3+ bilaterally.     Radial: 3+ bilaterally.     Femoral: 3+ bilaterally.     Dorsalis pedis: 3+ bilaterally.     Posterior tibial: 3+ bilaterally.  Edema:     Peripheral edema absent.   Abdominal:      General: Bowel sounds are normal.      Palpations: Abdomen is soft.      Tenderness: There is no abdominal tenderness.   Musculoskeletal: Normal range of motion.         General: No tenderness.      Cervical back: Normal range of motion and neck supple. Skin:     General: Skin is warm and dry.   Neurological:      General: No focal deficit present.      Mental Status: Alert and oriented to person, place and time.          Diagnostic Data    ECG 12 Lead    Date/Time: 11/30/2023 1:47 PM  Performed by: Buddy Javier MD    Authorized by: Buddy Javier MD  Comparison: compared with previous ECG   Rhythm: sinus rhythm  Rate: normal  BPM: 74  QRS axis: normal    Clinical impression: normal ECG          Assessment and Plan  Diagnoses and all orders for this visit:    Atrial fibrillation, paroxysmal-  no complaints in sinus only warfarin  -     POC Protime / INR    Hypertension, essential- BP is 110.60 on Lisinopril 40 mg, Amlodipine 5 mg    Hyperlipidemia, mixed- On Crestor 20 mg, he is tolerating well , needs a Lipid         Return in about 6 months (around 5/30/2024) for Recheck with Dr. Javier.    Buddy Javier MD  11/30/2023

## 2023-12-01 RX ORDER — WARFARIN SODIUM 5 MG/1
TABLET ORAL
Qty: 90 TABLET | Refills: 1 | Status: SHIPPED | OUTPATIENT
Start: 2023-12-01

## 2023-12-02 DIAGNOSIS — I10 HYPERTENSION, ESSENTIAL: Primary | ICD-10-CM

## 2023-12-04 RX ORDER — LISINOPRIL 40 MG/1
40 TABLET ORAL DAILY
Qty: 90 TABLET | Refills: 0 | Status: SHIPPED | OUTPATIENT
Start: 2023-12-04

## 2023-12-05 RX ORDER — ROSUVASTATIN CALCIUM 20 MG/1
20 TABLET, COATED ORAL DAILY
Qty: 30 TABLET | Refills: 0 | Status: SHIPPED | OUTPATIENT
Start: 2023-12-05

## 2023-12-15 ENCOUNTER — LAB (OUTPATIENT)
Dept: FAMILY MEDICINE CLINIC | Facility: CLINIC | Age: 83
End: 2023-12-15
Payer: MEDICARE

## 2023-12-15 DIAGNOSIS — Z12.5 SPECIAL SCREENING FOR MALIGNANT NEOPLASM OF PROSTATE: Primary | ICD-10-CM

## 2023-12-15 DIAGNOSIS — R26.9 ABNORMAL GAIT: ICD-10-CM

## 2023-12-15 DIAGNOSIS — E66.01 MORBID (SEVERE) OBESITY DUE TO EXCESS CALORIES: ICD-10-CM

## 2023-12-15 DIAGNOSIS — I48.0 PAF (PAROXYSMAL ATRIAL FIBRILLATION): ICD-10-CM

## 2023-12-15 DIAGNOSIS — E55.9 VITAMIN D DEFICIENCY: ICD-10-CM

## 2023-12-15 DIAGNOSIS — I10 HYPERTENSION, ESSENTIAL: ICD-10-CM

## 2023-12-15 DIAGNOSIS — E78.2 HYPERLIPIDEMIA, MIXED: ICD-10-CM

## 2023-12-15 DIAGNOSIS — E03.9 ACQUIRED HYPOTHYROIDISM: ICD-10-CM

## 2023-12-15 DIAGNOSIS — R73.9 HYPERGLYCEMIA: ICD-10-CM

## 2023-12-15 DIAGNOSIS — G62.9 NEUROPATHY: ICD-10-CM

## 2023-12-15 DIAGNOSIS — E53.8 VITAMIN B12 DEFICIENCY: ICD-10-CM

## 2023-12-15 PROCEDURE — 36415 COLL VENOUS BLD VENIPUNCTURE: CPT | Performed by: INTERNAL MEDICINE

## 2023-12-16 LAB
ALBUMIN SERPL-MCNC: 4.3 G/DL (ref 3.7–4.7)
ALBUMIN/GLOB SERPL: 1.3 {RATIO} (ref 1.2–2.2)
ALP SERPL-CCNC: 63 IU/L (ref 44–121)
ALT SERPL-CCNC: 11 IU/L (ref 0–44)
AST SERPL-CCNC: 19 IU/L (ref 0–40)
BASOPHILS # BLD AUTO: 0 X10E3/UL (ref 0–0.2)
BASOPHILS NFR BLD AUTO: 0 %
BILIRUB SERPL-MCNC: 0.4 MG/DL (ref 0–1.2)
BUN SERPL-MCNC: 26 MG/DL (ref 8–27)
BUN/CREAT SERPL: 20 (ref 10–24)
CALCIUM SERPL-MCNC: 9.4 MG/DL (ref 8.6–10.2)
CHLORIDE SERPL-SCNC: 99 MMOL/L (ref 96–106)
CHOLEST SERPL-MCNC: 126 MG/DL (ref 100–199)
CO2 SERPL-SCNC: 22 MMOL/L (ref 20–29)
CREAT SERPL-MCNC: 1.33 MG/DL (ref 0.76–1.27)
EGFRCR SERPLBLD CKD-EPI 2021: 53 ML/MIN/1.73
EOSINOPHIL # BLD AUTO: 0.1 X10E3/UL (ref 0–0.4)
EOSINOPHIL NFR BLD AUTO: 2 %
ERYTHROCYTE [DISTWIDTH] IN BLOOD BY AUTOMATED COUNT: 13.4 % (ref 11.6–15.4)
GLOBULIN SER CALC-MCNC: 3.3 G/DL (ref 1.5–4.5)
GLUCOSE SERPL-MCNC: 100 MG/DL (ref 70–99)
HBA1C MFR BLD: 6.3 % (ref 4.8–5.6)
HCT VFR BLD AUTO: 34.1 % (ref 37.5–51)
HDLC SERPL-MCNC: 36 MG/DL
HGB BLD-MCNC: 11.3 G/DL (ref 13–17.7)
IMM GRANULOCYTES # BLD AUTO: 0 X10E3/UL (ref 0–0.1)
IMM GRANULOCYTES NFR BLD AUTO: 0 %
INR PPP: 2.4 (ref 0.9–1.2)
LDLC SERPL CALC-MCNC: 60 MG/DL (ref 0–99)
LYMPHOCYTES # BLD AUTO: 1.6 X10E3/UL (ref 0.7–3.1)
LYMPHOCYTES NFR BLD AUTO: 26 %
MCH RBC QN AUTO: 30.2 PG (ref 26.6–33)
MCHC RBC AUTO-ENTMCNC: 33.1 G/DL (ref 31.5–35.7)
MCV RBC AUTO: 91 FL (ref 79–97)
MONOCYTES # BLD AUTO: 0.7 X10E3/UL (ref 0.1–0.9)
MONOCYTES NFR BLD AUTO: 12 %
NEUTROPHILS # BLD AUTO: 3.8 X10E3/UL (ref 1.4–7)
NEUTROPHILS NFR BLD AUTO: 60 %
PLATELET # BLD AUTO: 208 X10E3/UL (ref 150–450)
POTASSIUM SERPL-SCNC: 4.5 MMOL/L (ref 3.5–5.2)
PROT SERPL-MCNC: 7.6 G/DL (ref 6–8.5)
PROTHROMBIN TIME: 23.6 SEC (ref 9.1–12)
PSA SERPL-MCNC: 10.4 NG/ML (ref 0–4)
RBC # BLD AUTO: 3.74 X10E6/UL (ref 4.14–5.8)
SODIUM SERPL-SCNC: 137 MMOL/L (ref 134–144)
TRIGL SERPL-MCNC: 181 MG/DL (ref 0–149)
TSH SERPL DL<=0.005 MIU/L-ACNC: 3.4 UIU/ML (ref 0.45–4.5)
VIT B12 SERPL-MCNC: 595 PG/ML (ref 232–1245)
VLDLC SERPL CALC-MCNC: 30 MG/DL (ref 5–40)
WBC # BLD AUTO: 6.3 X10E3/UL (ref 3.4–10.8)

## 2023-12-18 ENCOUNTER — ANTICOAGULATION VISIT (OUTPATIENT)
Dept: PHARMACY | Facility: HOSPITAL | Age: 83
End: 2023-12-18
Payer: MEDICARE

## 2023-12-18 NOTE — PROGRESS NOTES
Anticoagulation Clinic - Remote Progress Note  Remote Lab     Indication: paroxysmal afib  Referring Provider: Buddy Javier  Initial Warfarin Start Date:   Goal INR: 2.0-3.0  Current Drug Interactions: omega three fatty acid; CoQ-10   [id number]: 3 (HTN, Age)     Diet: ~ 2 Tablespoon QOD mixed cooked greens; green beans 2x weekly 2/20/2023  Alcohol: None  Tobacco: None  OTC Pain Medication: APAP PRN     INR History:  Date 3/23 3/30 4/8 4/16 4/23 5/7 6/4 6/18 7/2 7/19 7/28 8/11   Total Weekly Dose 27.5mg 25mg 25mg 25mg 25mg 25mg 25mg 25mg 25mg 25mg 25mg 25mg   INR 3.2 2.7 3.2 2.0 2.4 2.7 1.8 1.8 2.3 1.9 1.9 2.6   Notes     LD doxy 4/8       incr GLV   decr GLV incr GLV  incr GLV decr GLV      Date 8/25 9/22 10/20 11/18 11/29 12/28   3/2/22 3/1/ 4/5/22 4/11 4/27   Total WeeklyDose 25mg 25mg 25mg 25mg 25mg 25mg non- compliant 25mg 25 mg 25 mg 25mg 25 mg   INR 2.1 2.8 2.8 3.1 2.4 2.0   2.9 3.2 3.5 2.5 2.8   Notes       decr GLV       APAP   decr GLV          Date 5/31 6/28 7/26 8/25 9/8 10/6 10/20 11/10 11/30 1/4 2/2   Total Weekly Dose 25 mg 25 mg 25 mg 25 mg 25 mg 25 mg 25mg 25mg 25 mg 25 mg 25mg   INR 2.9 2.8 2.6 3.5 2.9 3.5 2.5 2.6 2.7 3.0 3.6   Notes                            Date 2/17  3/16 4/17 5/1 5/30 6/23  7/25 8/8 08/22 9/21 10/06   Total Weekly Dose 25mg  25 mg 25 mg  25 mg  25 mg 25mg  25 mg 22.5mg 22.5 mg 22.5 mg 22.5 mg    INR 2.7 3.3 3.5 2.4 3.1 2.9  4.0 2.5 2.9 1.9 2.6   Notes                   Inc glv        Date 10/23 10/30 11/15  11/30  12/15               Total Weekly Dose 22.5 mg  21.25mg 22.5 mg  22.5 mg  22.5mg               INR 3.4 2.6 2.4  3.2  2.4               Notes APAP  Rec'd 10/24 Spoke 10/31  Spoke 11/16  APAP  spoke 12/19                  Phone Interview:  Tablet Strength: 5mg; 2.5mg  Patient Contact Info: [phone] (Mobile)  Estimated OOP cost: will send if patient comes to [place]   Verbal Release Auth:   Lab Contact Info: [facility] POCT    Patient  Findings  Negatives: Signs/symptoms of thrombosis, Signs/symptoms of bleeding, Laboratory test error suspected, Change in health, Change in alcohol use, Change in activity, Upcoming invasive procedure, Emergency department visit, Upcoming dental procedure, Missed doses, Extra doses, Change in medications, Change in diet/appetite, Hospital admission, Bruising, Other complaints   Comments: No changes since last encounter.     Plan:  1. INR is therapeutic today at 2.4 (goal 2.0-3.0). Instructed Mr. Houser to continue maintenance regimen of warfarin 2.5 mg oral daily except 5 mg WedSat until recheck (21.25 mg/week)   2. Repeat INR in 4 weeks 1/12.   3. Verbal information provided over the phone. Kurt Houser RBV dosing instructions, expresses understanding by teach back, and has no further questions at this time.     Laura Ames, PharmD  12/18/2023  13:33 EST

## 2023-12-19 ENCOUNTER — OFFICE VISIT (OUTPATIENT)
Dept: FAMILY MEDICINE CLINIC | Facility: CLINIC | Age: 83
End: 2023-12-19
Payer: MEDICARE

## 2023-12-19 VITALS
RESPIRATION RATE: 16 BRPM | SYSTOLIC BLOOD PRESSURE: 134 MMHG | HEIGHT: 74 IN | HEART RATE: 81 BPM | DIASTOLIC BLOOD PRESSURE: 84 MMHG | WEIGHT: 226.8 LBS | OXYGEN SATURATION: 98 % | BODY MASS INDEX: 29.11 KG/M2

## 2023-12-19 DIAGNOSIS — E53.8 VITAMIN B12 DEFICIENCY: ICD-10-CM

## 2023-12-19 DIAGNOSIS — R97.20 ELEVATED PSA: ICD-10-CM

## 2023-12-19 DIAGNOSIS — E55.9 VITAMIN D DEFICIENCY: ICD-10-CM

## 2023-12-19 DIAGNOSIS — G62.9 NEUROPATHY: ICD-10-CM

## 2023-12-19 DIAGNOSIS — E78.2 HYPERLIPIDEMIA, MIXED: ICD-10-CM

## 2023-12-19 DIAGNOSIS — E03.9 ACQUIRED HYPOTHYROIDISM: ICD-10-CM

## 2023-12-19 DIAGNOSIS — R73.9 HYPERGLYCEMIA: ICD-10-CM

## 2023-12-19 DIAGNOSIS — I10 HYPERTENSION, ESSENTIAL: Primary | ICD-10-CM

## 2023-12-19 DIAGNOSIS — N18.31 CHRONIC KIDNEY DISEASE, STAGE 3A: ICD-10-CM

## 2023-12-19 RX ORDER — GABAPENTIN 600 MG/1
1200 TABLET ORAL 3 TIMES DAILY
Qty: 180 TABLET | Refills: 2 | Status: SHIPPED | OUTPATIENT
Start: 2023-12-19

## 2023-12-19 NOTE — ASSESSMENT & PLAN NOTE
GFR is 53.Patient is instructed to not take any NSAIDs.  Medicines as directed.  Stay well-hydrated.

## 2023-12-19 NOTE — ASSESSMENT & PLAN NOTE
PSA is 10.4.  I had a long discussion with him.  We can recheck it in 6 months.  If it stays the same or gets better we will follow it if it goes up we will send him to urologist.

## 2023-12-19 NOTE — PROGRESS NOTES
Patient Name: Kurt Houser  : 1940   MRN: [id number]     Chief Complaint:    Chief Complaint   Patient presents with    Follow-up       History of Present Illness: Kurt Houser is a 83 y.o. male who is here today for follow up on BG  HPI        Review of Systems:   Review of Systems   Constitutional: Negative.    HENT: Negative.     Eyes: Negative.    Respiratory: Negative.     Cardiovascular: Negative.    Gastrointestinal: Negative.    Neurological: Negative.         Past Medical History:   Past Medical History:   Diagnosis Date    Acute osteomyelitis of foot     Amputated toe of left foot     Anemia of decreased vitamin B12 absorption     BMI 27.0-27.9,adult     Degenerative joint disease involving multiple joints     Diverticular disease of colon     Drug therapy     Dyslipidemia     Essential hypertension     Foot osteomyelitis     Frequent PVCs     Glaucoma     High risk medication use     History of adenomatous polyp of colon     Long term current use of anticoagulant     Low density lipoprotein (LDL) cholesterol level below 100mg/dL     Mixed hyperlipidemia     Neuropathy     Osteoarthritis     Paroxysmal atrial fibrillation     Polyneuropathy     Prediabetes     Ventricular premature beats     Vitamin B12 deficiency     Vitamin D deficiency        Past Surgical History:   Past Surgical History:   Procedure Laterality Date    CATARACT EXTRACTION, BILATERAL      FOOT SURGERY Left     TRANS METATARSAL AMPUTATION  2020    5th metatarsal amputation/IV abx    WRIST SURGERY Left        Family History:   Family History   Problem Relation Age of Onset    Colon cancer Father     Liver cancer Brother        Social History:   Social History     Socioeconomic History    Marital status:    Tobacco Use    Smoking status: Former     Packs/day: 1.00     Years: 27.00     Additional pack years: 0.00     Total pack years: 27.00     Types: Cigarettes, Cigars     Start date:      Quit date:  2000     Years since quittin.9    Smokeless tobacco: Never    Tobacco comments:     1 cigar/day, 2.40 years   Vaping Use    Vaping Use: Never used   Substance and Sexual Activity    Alcohol use: Not Currently     Comment: about every 1-2 years he nikkyt have a drink    Drug use: Never    Sexual activity: Defer       Medications:     Current Outpatient Medications:     amLODIPine (NORVASC) 5 MG tablet, Take 1 tablet by mouth Daily., Disp: 90 tablet, Rfl: 1    Coenzyme Q10 (CoQ-10) 100 MG capsule, Take  by mouth Daily., Disp: , Rfl:     Cyanocobalamin (B-12) 1000 MCG capsule, Take 1,000 mg by mouth Daily., Disp: 90 capsule, Rfl: 1    gabapentin (NEURONTIN) 600 MG tablet, Take 2 tablets by mouth 3 (Three) Times a Day., Disp: 180 tablet, Rfl: 2    levothyroxine (SYNTHROID, LEVOTHROID) 25 MCG tablet, Take 1 tablet by mouth Daily., Disp: 90 tablet, Rfl: 1    lisinopril (PRINIVIL,ZESTRIL) 40 MG tablet, Take 1 tablet by mouth once daily, Disp: 90 tablet, Rfl: 0    Magnesium 250 MG tablet, Take 1 tablet by mouth Daily., Disp: , Rfl:     Omega-3 Fatty Acids (fish oil) 1000 MG capsule capsule, Take  by mouth Daily With Breakfast., Disp: , Rfl:     rosuvastatin (CRESTOR) 20 MG tablet, Take 1 tablet by mouth once daily, Disp: 30 tablet, Rfl: 0    vitamin D3 125 MCG (5000 UT) capsule capsule, Take 1 capsule by mouth once daily, Disp: 90 capsule, Rfl: 0    warfarin (COUMADIN) 2.5 MG tablet, TAKE 1 TO 2 TABLETS BY MOUTH ONCE DAILY OR  AS  DIRECTED  BY  ANTICOAGULATION  CLINIC,  TAKE  ALTERNATING  DAYS  OF  5  MG  TABLETS, Disp: 45 tablet, Rfl: 1    warfarin (COUMADIN) 5 MG tablet, Take 1 tablet by mouth once daily, Disp: 90 tablet, Rfl: 1    Allergies:   Allergies   Allergen Reactions    Acetaminophen Mental Status Change    Hydrocodone Bitart (Antituss) [Hydrocodone] Mental Status Change    Loratadine Hives         Physical Exam:  Vital Signs:   Vitals:    23 1416   BP: 134/84   BP Location: Left arm   Patient Position:  "Sitting   Cuff Size: Adult   Pulse: 81   Resp: 16   SpO2: 98%   Weight: 103 kg (226 lb 12.8 oz)   Height: 188 cm (74.02\")     Body mass index is 29.11 kg/m².     Physical Exam  Vitals and nursing note reviewed.   Constitutional:       Appearance: Normal appearance. He is normal weight.   HENT:      Head: Normocephalic and atraumatic.      Right Ear: Tympanic membrane, ear canal and external ear normal.      Left Ear: Tympanic membrane, ear canal and external ear normal.      Nose: Nose normal.      Mouth/Throat:      Mouth: Mucous membranes are dry.      Pharynx: Oropharynx is clear.   Eyes:      Extraocular Movements: Extraocular movements intact.      Conjunctiva/sclera: Conjunctivae normal.      Pupils: Pupils are equal, round, and reactive to light.   Cardiovascular:      Rate and Rhythm: Normal rate and regular rhythm.      Pulses: Normal pulses.      Heart sounds: Normal heart sounds.   Pulmonary:      Effort: Pulmonary effort is normal.      Breath sounds: Normal breath sounds.   Musculoskeletal:      Cervical back: Normal range of motion and neck supple.   Feet:      Comments:      Neurological:      Mental Status: He is alert.         Procedures      Assessment/Plan:   Diagnoses and all orders for this visit:    1. Hypertension, essential (Primary)  Assessment & Plan:  Discussed with patient to monitor their blood pressure and if systolic blood pressure goes above 140 or diastolic is above 90 to return to clinic.  Take medicines as directed, call for any problems, patient not having or any worrisome symptoms.        Orders:  -     Comprehensive Metabolic Panel; Future  -     Hemoglobin A1c; Future  -     Lipid Panel; Future  -     CBC & Differential; Future  -     Vitamin B12; Future  -     Vitamin D,25-Hydroxy; Future  -     TSH Rfx On Abnormal To Free T4; Future    2. Hyperlipidemia, mixed  Assessment & Plan:  HDL 36.  LDL 60.  Triglycerides 181.  Recheck in 6 months.    Orders:  -     Comprehensive " Metabolic Panel; Future  -     Hemoglobin A1c; Future  -     Lipid Panel; Future  -     CBC & Differential; Future  -     Vitamin B12; Future  -     Vitamin D,25-Hydroxy; Future  -     TSH Rfx On Abnormal To Free T4; Future    3. Acquired hypothyroidism  Assessment & Plan:  TSH is 3.400.  Recheck in 6 months.    Orders:  -     Comprehensive Metabolic Panel; Future  -     Hemoglobin A1c; Future  -     Lipid Panel; Future  -     CBC & Differential; Future  -     Vitamin B12; Future  -     Vitamin D,25-Hydroxy; Future  -     TSH Rfx On Abnormal To Free T4; Future    4. Hyperglycemia  Assessment & Plan:  A1c is 6.3.  Recheck in 6 months.    Orders:  -     Comprehensive Metabolic Panel; Future  -     Hemoglobin A1c; Future  -     Lipid Panel; Future  -     CBC & Differential; Future  -     Vitamin B12; Future  -     Vitamin D,25-Hydroxy; Future  -     TSH Rfx On Abnormal To Free T4; Future    5. Vitamin B12 deficiency  Assessment & Plan:  B12 is 595.  Recheck in 6 months.    Orders:  -     Comprehensive Metabolic Panel; Future  -     Hemoglobin A1c; Future  -     Lipid Panel; Future  -     CBC & Differential; Future  -     Vitamin B12; Future  -     Vitamin D,25-Hydroxy; Future  -     TSH Rfx On Abnormal To Free T4; Future    6. Vitamin D deficiency  Assessment & Plan:  Blood work in 6 months.    Orders:  -     Comprehensive Metabolic Panel; Future  -     Hemoglobin A1c; Future  -     Lipid Panel; Future  -     CBC & Differential; Future  -     Vitamin B12; Future  -     Vitamin D,25-Hydroxy; Future  -     TSH Rfx On Abnormal To Free T4; Future    7. Chronic kidney disease, stage 3a  Assessment & Plan:  GFR is 53.Patient is instructed to not take any NSAIDs.  Medicines as directed.  Stay well-hydrated.      Orders:  -     Comprehensive Metabolic Panel; Future  -     Hemoglobin A1c; Future  -     Lipid Panel; Future  -     CBC & Differential; Future  -     Vitamin B12; Future  -     Vitamin D,25-Hydroxy; Future  -     TSH  Rfx On Abnormal To Free T4; Future    8. Elevated PSA  Assessment & Plan:  PSA is 10.4.  I had a long discussion with him.  We can recheck it in 6 months.  If it stays the same or gets better we will follow it if it goes up we will send him to urologist.    Orders:  -     Comprehensive Metabolic Panel; Future  -     Hemoglobin A1c; Future  -     Lipid Panel; Future  -     CBC & Differential; Future  -     Vitamin B12; Future  -     Vitamin D,25-Hydroxy; Future  -     TSH Rfx On Abnormal To Free T4; Future    9. Neuropathy  Assessment & Plan:  Refill gabapentin.    Orders:  -     gabapentin (NEURONTIN) 600 MG tablet; Take 2 tablets by mouth 3 (Three) Times a Day.  Dispense: 180 tablet; Refill: 2  -     Comprehensive Metabolic Panel; Future  -     Hemoglobin A1c; Future  -     Lipid Panel; Future  -     CBC & Differential; Future  -     Vitamin B12; Future  -     Vitamin D,25-Hydroxy; Future  -     TSH Rfx On Abnormal To Free T4; Future             Follow Up:   No follow-ups on file.    Leo Cordova MD  [facility]

## 2024-01-03 ENCOUNTER — TELEPHONE (OUTPATIENT)
Dept: FAMILY MEDICINE CLINIC | Facility: CLINIC | Age: 84
End: 2024-01-03
Payer: MEDICARE

## 2024-01-03 NOTE — TELEPHONE ENCOUNTER
HUB TO RELAY       LEFT VM WITH EMERGENCY CONTACT TO SCHEDULE HIM AN APPT FOR SAME DAY SINUS SYMPTOMS

## 2024-01-03 NOTE — TELEPHONE ENCOUNTER
Caller: el nieto    Relationship: Emergency Contact    Best call back number: [phone]    What medication are you requesting:     What are your current symptoms: SINUS PRESSURE, COUGH, NASAL CONGESTION    How long have you been experiencing symptoms: A FEW DAYS    If a prescription is needed, what is your preferred pharmacy and phone number: [facility] [address] [place], [place] [address] [phone] [facility] [phone] FX     Additional notes: IF PATIENT NEEDS TO BE SEEN PLEASE CALL         Colon, Pauline

## 2024-01-05 ENCOUNTER — OFFICE VISIT (OUTPATIENT)
Dept: FAMILY MEDICINE CLINIC | Facility: CLINIC | Age: 84
End: 2024-01-05
Payer: MEDICARE

## 2024-01-05 VITALS
RESPIRATION RATE: 16 BRPM | HEIGHT: 74 IN | SYSTOLIC BLOOD PRESSURE: 120 MMHG | BODY MASS INDEX: 28.72 KG/M2 | OXYGEN SATURATION: 98 % | HEART RATE: 74 BPM | TEMPERATURE: 98 F | DIASTOLIC BLOOD PRESSURE: 82 MMHG | WEIGHT: 223.8 LBS

## 2024-01-05 DIAGNOSIS — J01.00 ACUTE NON-RECURRENT MAXILLARY SINUSITIS: ICD-10-CM

## 2024-01-05 DIAGNOSIS — N18.31 CHRONIC KIDNEY DISEASE, STAGE 3A: ICD-10-CM

## 2024-01-05 DIAGNOSIS — E03.9 ACQUIRED HYPOTHYROIDISM: ICD-10-CM

## 2024-01-05 DIAGNOSIS — E53.8 VITAMIN B12 DEFICIENCY: ICD-10-CM

## 2024-01-05 DIAGNOSIS — G62.9 NEUROPATHY: ICD-10-CM

## 2024-01-05 DIAGNOSIS — E78.2 HYPERLIPIDEMIA, MIXED: ICD-10-CM

## 2024-01-05 DIAGNOSIS — Z00.00 MEDICARE ANNUAL WELLNESS VISIT, SUBSEQUENT: Primary | ICD-10-CM

## 2024-01-05 DIAGNOSIS — R97.20 ELEVATED PSA: ICD-10-CM

## 2024-01-05 DIAGNOSIS — E55.9 VITAMIN D DEFICIENCY: ICD-10-CM

## 2024-01-05 DIAGNOSIS — R73.9 HYPERGLYCEMIA: ICD-10-CM

## 2024-01-05 DIAGNOSIS — I10 HYPERTENSION, ESSENTIAL: ICD-10-CM

## 2024-01-05 DIAGNOSIS — R05.1 ACUTE COUGH: ICD-10-CM

## 2024-01-05 LAB
EXPIRATION DATE: NORMAL
FLUAV AG UPPER RESP QL IA.RAPID: NOT DETECTED
FLUBV AG UPPER RESP QL IA.RAPID: NOT DETECTED
INTERNAL CONTROL: NORMAL
Lab: NORMAL
SARS-COV-2 AG UPPER RESP QL IA.RAPID: NOT DETECTED

## 2024-01-05 RX ORDER — AMOXICILLIN AND CLAVULANATE POTASSIUM 875; 125 MG/1; MG/1
1 TABLET, FILM COATED ORAL 2 TIMES DAILY
Qty: 20 TABLET | Refills: 0 | Status: SHIPPED | OUTPATIENT
Start: 2024-01-05

## 2024-01-05 NOTE — PROGRESS NOTES
The ABCs of the Annual Wellness Visit  Subsequent Medicare Wellness Visit    Subjective    Kurt Houser is a 83 y.o. male who presents for a Subsequent Medicare Wellness Visit.    The following portions of the patient's history were reviewed and   updated as appropriate: allergies, current medications, past family history, past medical history, past social history, past surgical history, and problem list.    Compared to one year ago, the patient feels his physical   health is worse.    Compared to one year ago, the patient feels his mental   health is the same.    Recent Hospitalizations:  He was not admitted to the hospital during the last year.       Current Medical Providers:  Patient Care Team:  Leo Cordova MD as PCP - General (Family Medicine)  Buddy Javier MD as Consulting Physician (Cardiology)    Outpatient Medications Prior to Visit   Medication Sig Dispense Refill    amLODIPine (NORVASC) 5 MG tablet Take 1 tablet by mouth Daily. 90 tablet 1    Coenzyme Q10 (CoQ-10) 100 MG capsule Take  by mouth Daily.      Cyanocobalamin (B-12) 1000 MCG capsule Take 1,000 mg by mouth Daily. 90 capsule 1    gabapentin (NEURONTIN) 600 MG tablet Take 2 tablets by mouth 3 (Three) Times a Day. 180 tablet 2    levothyroxine (SYNTHROID, LEVOTHROID) 25 MCG tablet Take 1 tablet by mouth Daily. 90 tablet 1    lisinopril (PRINIVIL,ZESTRIL) 40 MG tablet Take 1 tablet by mouth once daily 90 tablet 0    Magnesium 250 MG tablet Take 1 tablet by mouth Daily.      Omega-3 Fatty Acids (fish oil) 1000 MG capsule capsule Take  by mouth Daily With Breakfast.      rosuvastatin (CRESTOR) 20 MG tablet Take 1 tablet by mouth once daily 30 tablet 0    vitamin D3 125 MCG (5000 UT) capsule capsule Take 1 capsule by mouth once daily 90 capsule 0    warfarin (COUMADIN) 2.5 MG tablet TAKE 1 TO 2 TABLETS BY MOUTH ONCE DAILY OR  AS  DIRECTED  BY  ANTICOAGULATION  CLINIC,  TAKE  ALTERNATING  DAYS  OF  5  MG  TABLETS 45 tablet 1     "warfarin (COUMADIN) 5 MG tablet Take 1 tablet by mouth once daily 90 tablet 1     No facility-administered medications prior to visit.       No opioid medication identified on active medication list. I have reviewed chart for other potential  high risk medication/s and harmful drug interactions in the elderly.        Aspirin is not on active medication list.  Aspirin use is contraindicated for this patient due to: current use of warfarin.  .    Patient Active Problem List   Diagnosis    Hypertension, essential    Hyperlipidemia, mixed    Borderline diabetes    Acute osteomyelitis of foot    Diverticular disease of colon    High risk medication use    History of colonic polyps    Long term (current) use of anticoagulants    Low HDL (under 40)    Vitamin B12 deficiency    Neuropathy    Primary osteoarthritis involving multiple joints    Ventricular premature beats    Vitamin D deficiency    Hyperglycemia    Acquired hypothyroidism    Low back pain    Medicare annual wellness visit, subsequent    Chronic kidney disease, stage 3a    Morbid (severe) obesity due to excess calories    Atrial fibrillation, chronic    Abnormal gait    Elevated PSA    Acute cough    Acute non-recurrent maxillary sinusitis     Advance Care Planning   Advance Care Planning     Advance Directive is not on file.  ACP discussion was held with the patient during this visit. Patient does not have an advance directive, information provided.     Objective    Vitals:    01/05/24 1331   BP: 120/82   BP Location: Left arm   Patient Position: Sitting   Cuff Size: Adult   Pulse: 74   Resp: 16   Temp: 98 °F (36.7 °C)   SpO2: 98%   Weight: 102 kg (223 lb 12.8 oz)   Height: 188 cm (74.02\")     Estimated body mass index is 28.72 kg/m² as calculated from the following:    Height as of this encounter: 188 cm (74.02\").    Weight as of this encounter: 102 kg (223 lb 12.8 oz).           Does the patient have evidence of cognitive impairment? No    Lab Results "   Component Value Date    CHLPL 126 12/15/2023    TRIG 181 (H) 12/15/2023    HDL 36 (L) 12/15/2023    LDL 60 12/15/2023    VLDL 30 12/15/2023    HGBA1C 6.3 (H) 12/15/2023        HEALTH RISK ASSESSMENT    Smoking Status:  Social History     Tobacco Use   Smoking Status Former    Packs/day: 1.00    Years: 27.00    Additional pack years: 0.00    Total pack years: 27.00    Types: Cigarettes, Cigars    Start date:     Quit date:     Years since quittin.0   Smokeless Tobacco Never   Tobacco Comments    1 cigar/day, 2.40 years     Alcohol Consumption:  Social History     Substance and Sexual Activity   Alcohol Use Not Currently    Comment: about every 1-2 years he mighst have a drink     Fall Risk Screen:    MOOKIE Fall Risk Assessment was completed, and patient is at MODERATE risk for falls. Assessment completed on:2024    Depression Screenin/19/2023     2:20 PM   PHQ-2/PHQ-9 Depression Screening   Little Interest or Pleasure in Doing Things 0-->not at all   Feeling Down, Depressed or Hopeless 0-->not at all   PHQ-9: Brief Depression Severity Measure Score 0       Health Habits and Functional and Cognitive Screenin/5/2024     1:38 PM   Functional & Cognitive Status   Do you have difficulty preparing food and eating? No   Do you have difficulty bathing yourself, getting dressed or grooming yourself? No   Do you have difficulty using the toilet? No   Do you have difficulty moving around from place to place? No   Do you have trouble with steps or getting out of a bed or a chair? Yes   Current Diet Well Balanced Diet   Dental Exam Up to date   Eye Exam Up to date   Exercise (times per week) 0 times per week   Current Exercises Include Walking   Do you need help using the phone?  No   Are you deaf or do you have serious difficulty hearing?  No   Do you need help to go to places out of walking distance? Yes   Do you need help shopping? Yes   Do you need help preparing meals?  Yes   Do you  need help with housework?  No   Do you need help with laundry? No   Do you need help taking your medications? No   Do you need help managing money? No   Have you felt unusual stress, anger or loneliness in the last month? No   Who do you live with? Spouse   If you need help, do you have trouble finding someone available to you? No   Do you have difficulty concentrating, remembering or making decisions? No       Age-appropriate Screening Schedule:  Refer to the list below for future screening recommendations based on patient's age, sex and/or medical conditions. Orders for these recommended tests are listed in the plan section. The patient has been provided with a written plan.    Health Maintenance   Topic Date Due    ZOSTER VACCINE (2 of 2) 09/01/2017    COVID-19 Vaccine (3 - 2023-24 season) 09/01/2023    TDAP/TD VACCINES (2 - Td or Tdap) 12/29/2026 (Originally 11/1/2023)    BMI FOLLOWUP  06/20/2024    LIPID PANEL  12/15/2024    ANNUAL WELLNESS VISIT  01/05/2025    INFLUENZA VACCINE  Completed    Pneumococcal Vaccine 65+  Completed                  CMS Preventative Services Quick Reference  Risk Factors Identified During Encounter  None Identified  The above risks/problems have been discussed with the patient.  Pertinent information has been shared with the patient in the After Visit Summary.  An After Visit Summary and PPPS were made available to the patient.    Follow Up:   Next Medicare Wellness visit to be scheduled in 1 year.       Additional E&M Note during same encounter follows:  Patient has multiple medical problems which are significant and separately identifiable that require additional work above and beyond the Medicare Wellness Visit.      Chief Complaint  Nasal Congestion, Cough, and Sinusitis (Onset 3 days ago )    Subjective        HPI  Kurt Houser is also being seen today for Wellness and getting sick    Review of Systems   Constitutional: Negative.    HENT:  Positive for rhinorrhea and sinus  "pressure.    Eyes: Negative.    Respiratory:  Positive for cough.    Cardiovascular: Negative.        Objective   Vital Signs:  /82 (BP Location: Left arm, Patient Position: Sitting, Cuff Size: Adult)   Pulse 74   Temp 98 °F (36.7 °C)   Resp 16   Ht 188 cm (74.02\")   Wt 102 kg (223 lb 12.8 oz)   SpO2 98%   BMI 28.72 kg/m²     Physical Exam     The following data was reviewed by: Leo Cordova MD on 01/05/2024:               Assessment and Plan   Diagnoses and all orders for this visit:    1. Medicare annual wellness visit, subsequent (Primary)    2. Acute non-recurrent maxillary sinusitis  Assessment & Plan:  Patient was COVID and flu negative.  He has pressure behind his sinuses.  Will treat her with some Augmentin.  Return to clinic if worse      3. Hypertension, essential  Assessment & Plan:  Discussed with patient to monitor their blood pressure and if systolic blood pressure goes above 140 or diastolic is above 90 to return to clinic.  Take medicines as directed, call for any problems, patient not having or any worrisome symptoms.          4. Hyperlipidemia, mixed  Assessment & Plan:  Blood work in 5 months      5. Acquired hypothyroidism    6. Vitamin B12 deficiency    7. Vitamin D deficiency    8. Chronic kidney disease, stage 3a  Assessment & Plan:  Patient is instructed to not take any NSAIDs.  Medicines as directed.  Stay well-hydrated.        9. Hyperglycemia    10. Neuropathy  Assessment & Plan:  Follow-up in 3 months.  He has an unsteady gait.  Offered physical therapy and he refuses at this time.  States he is already been      11. Elevated PSA  Assessment & Plan:  Recheck this summer.      12. Acute cough  Assessment & Plan:  Augmentin.  COVID and flu were negative.  Will hold off giving Doxy a Zithromax at this time.    Orders:  -     POCT SARS-CoV-2 Antigen MARCK + Flu    Other orders  -     amoxicillin-clavulanate (AUGMENTIN) 875-125 MG per tablet; Take 1 tablet by mouth 2 (Two) " Times a Day.  Dispense: 20 tablet; Refill: 0           I spent 10 minutes caring for Kurt on this date of service. This time includes time spent by me in the following activities:preparing for the visit, reviewing tests, obtaining and/or reviewing a separately obtained history, performing a medically appropriate examination and/or evaluation , counseling and educating the patient/family/caregiver, documenting information in the medical record, independently interpreting results and communicating that information with the patient/family/caregiver, and care coordination  Follow Up   No follow-ups on file.  Patient was given instructions and counseling regarding his condition or for health maintenance advice. Please see specific information pulled into the AVS if appropriate.

## 2024-01-05 NOTE — PROGRESS NOTES
Patient Name: Kurt Houser  : 1940   MRN: [id number]     Chief Complaint:  No chief complaint on file.      History of Present Illness: Kurt Houser is a 83 y.o. male who is here today for follow up on BW and sinuses  HPI        Review of Systems:   Review of Systems   Constitutional: Negative.    HENT: Negative.     Eyes: Negative.    Respiratory: Negative.     Cardiovascular: Negative.    Gastrointestinal: Negative.    Neurological: Negative.         Past Medical History:   Past Medical History:   Diagnosis Date    Acute osteomyelitis of foot     Amputated toe of left foot     Anemia of decreased vitamin B12 absorption     BMI 27.0-27.9,adult     Degenerative joint disease involving multiple joints     Diverticular disease of colon     Drug therapy     Dyslipidemia     Essential hypertension     Foot osteomyelitis     Frequent PVCs     Glaucoma     High risk medication use     History of adenomatous polyp of colon     Long term current use of anticoagulant     Low density lipoprotein (LDL) cholesterol level below 100mg/dL     Mixed hyperlipidemia     Neuropathy     Osteoarthritis     Paroxysmal atrial fibrillation     Polyneuropathy     Prediabetes     Ventricular premature beats     Vitamin B12 deficiency     Vitamin D deficiency        Past Surgical History:   Past Surgical History:   Procedure Laterality Date    CATARACT EXTRACTION, BILATERAL      FOOT SURGERY Left     TRANS METATARSAL AMPUTATION  2020    5th metatarsal amputation/IV abx    WRIST SURGERY Left        Family History:   Family History   Problem Relation Age of Onset    Colon cancer Father     Liver cancer Brother        Social History:   Social History     Socioeconomic History    Marital status:    Tobacco Use    Smoking status: Former     Packs/day: 1.00     Years: 27.00     Additional pack years: 0.00     Total pack years: 27.00     Types: Cigarettes, Cigars     Start date:      Quit date: 2000     Years  since quittin.0    Smokeless tobacco: Never    Tobacco comments:     1 cigar/day, 2.40 years   Vaping Use    Vaping Use: Never used   Substance and Sexual Activity    Alcohol use: Not Currently     Comment: about every 1-2 years he jania have a drink    Drug use: Never    Sexual activity: Defer       Medications:     Current Outpatient Medications:     amLODIPine (NORVASC) 5 MG tablet, Take 1 tablet by mouth Daily., Disp: 90 tablet, Rfl: 1    Coenzyme Q10 (CoQ-10) 100 MG capsule, Take  by mouth Daily., Disp: , Rfl:     Cyanocobalamin (B-12) 1000 MCG capsule, Take 1,000 mg by mouth Daily., Disp: 90 capsule, Rfl: 1    gabapentin (NEURONTIN) 600 MG tablet, Take 2 tablets by mouth 3 (Three) Times a Day., Disp: 180 tablet, Rfl: 2    levothyroxine (SYNTHROID, LEVOTHROID) 25 MCG tablet, Take 1 tablet by mouth Daily., Disp: 90 tablet, Rfl: 1    lisinopril (PRINIVIL,ZESTRIL) 40 MG tablet, Take 1 tablet by mouth once daily, Disp: 90 tablet, Rfl: 0    Magnesium 250 MG tablet, Take 1 tablet by mouth Daily., Disp: , Rfl:     Omega-3 Fatty Acids (fish oil) 1000 MG capsule capsule, Take  by mouth Daily With Breakfast., Disp: , Rfl:     rosuvastatin (CRESTOR) 20 MG tablet, Take 1 tablet by mouth once daily, Disp: 30 tablet, Rfl: 0    vitamin D3 125 MCG (5000 UT) capsule capsule, Take 1 capsule by mouth once daily, Disp: 90 capsule, Rfl: 0    warfarin (COUMADIN) 2.5 MG tablet, TAKE 1 TO 2 TABLETS BY MOUTH ONCE DAILY OR  AS  DIRECTED  BY  ANTICOAGULATION  CLINIC,  TAKE  ALTERNATING  DAYS  OF  5  MG  TABLETS, Disp: 45 tablet, Rfl: 1    warfarin (COUMADIN) 5 MG tablet, Take 1 tablet by mouth once daily, Disp: 90 tablet, Rfl: 1    Allergies:   Allergies   Allergen Reactions    Acetaminophen Mental Status Change    Hydrocodone Bitart (Antituss) [Hydrocodone] Mental Status Change    Loratadine Hives         Physical Exam:  Vital Signs: There were no vitals filed for this visit.  There is no height or weight on file to calculate BMI.  "    Physical Exam  Vitals and nursing note reviewed.   Constitutional:       Appearance: Normal appearance. He is normal weight.   HENT:      Head: Normocephalic and atraumatic.      Right Ear: Tympanic membrane, ear canal and external ear normal.      Left Ear: Tympanic membrane, ear canal and external ear normal.      Nose: Nose normal.      Mouth/Throat:      Mouth: Mucous membranes are dry.      Pharynx: Oropharynx is clear.   Eyes:      Extraocular Movements: Extraocular movements intact.      Conjunctiva/sclera: Conjunctivae normal.      Pupils: Pupils are equal, round, and reactive to light.   Cardiovascular:      Rate and Rhythm: Normal rate and regular rhythm.      Pulses: Normal pulses.      Heart sounds: Normal heart sounds.   Pulmonary:      Effort: Pulmonary effort is normal.      Breath sounds: Normal breath sounds.   Musculoskeletal:      Cervical back: Normal range of motion and neck supple.   Feet:      Comments: {DIABETIC FOOT EXAM FOR  (Optional):02669::\"Diabetic Foot Exam Performed\":0}     Neurological:      Mental Status: He is alert.         Procedures      Assessment/Plan:   Diagnoses and all orders for this visit:    1. Hypertension, essential (Primary)    2. Hyperlipidemia, mixed    3. Acquired hypothyroidism    4. Vitamin B12 deficiency    5. Vitamin D deficiency    6. Chronic kidney disease, stage 3a    7. Hyperglycemia             Follow Up:   No follow-ups on file.    Leo Cordova MD  [facility]     "

## 2024-01-05 NOTE — ASSESSMENT & PLAN NOTE
Patient was COVID and flu negative.  He has pressure behind his sinuses.  Will treat her with some Augmentin.  Return to clinic if worse

## 2024-01-05 NOTE — ASSESSMENT & PLAN NOTE
Follow-up in 3 months.  He has an unsteady gait.  Offered physical therapy and he refuses at this time.  States he is already been

## 2024-01-11 ENCOUNTER — ANTICOAGULATION VISIT (OUTPATIENT)
Dept: PHARMACY | Facility: HOSPITAL | Age: 84
End: 2024-01-11
Payer: MEDICARE

## 2024-01-11 ENCOUNTER — CLINICAL SUPPORT (OUTPATIENT)
Dept: CARDIOLOGY | Facility: CLINIC | Age: 84
End: 2024-01-11
Payer: MEDICARE

## 2024-01-11 DIAGNOSIS — I48.20 ATRIAL FIBRILLATION, CHRONIC: Primary | ICD-10-CM

## 2024-01-11 LAB — INR PPP: 3.9 (ref 0.9–1.1)

## 2024-01-11 NOTE — PROGRESS NOTES
Capillary Blood Specimen Collection  Capillary blood collection performed in clinic by Arti Chin MA. Patient tolerated the procedure well without complications.   01/11/24   Arti Chin MA

## 2024-01-11 NOTE — PROGRESS NOTES
Anticoagulation Clinic - Remote Progress Note  Remote Lab     Indication: paroxysmal afib  Referring Provider: Buddy Javier  Initial Warfarin Start Date:   Goal INR: 2.0-3.0  Current Drug Interactions: omega three fatty acid; CoQ-10   [id number]: 3 (HTN, Age)     Diet: ~ 2 Tablespoon QOD mixed cooked greens; green beans 2x weekly 1/12/2024  Alcohol: None  Tobacco: None  OTC Pain Medication: APAP PRN     INR History:  Date 3/23 3/30 4/8 4/16 4/23 5/7 6/4 6/18 7/2 7/19 7/28 8/11   Total Weekly Dose 27.5mg 25mg 25mg 25mg 25mg 25mg 25mg 25mg 25mg 25mg 25mg 25mg   INR 3.2 2.7 3.2 2.0 2.4 2.7 1.8 1.8 2.3 1.9 1.9 2.6   Notes     LD doxy 4/8       incr GLV   decr GLV incr GLV  incr GLV decr GLV      Date 8/25 9/22 10/20 11/18 11/29 12/28   3/2/22 3/1/ 4/5/22 4/11 4/27   Total WeeklyDose 25mg 25mg 25mg 25mg 25mg 25mg non- compliant 25mg 25 mg 25 mg 25mg 25 mg   INR 2.1 2.8 2.8 3.1 2.4 2.0   2.9 3.2 3.5 2.5 2.8   Notes       decr GLV       APAP   decr GLV          Date 5/31 6/28 7/26 8/25 9/8 10/6 10/20 11/10 11/30 1/4 2/2   Total Weekly Dose 25 mg 25 mg 25 mg 25 mg 25 mg 25 mg 25mg 25mg 25 mg 25 mg 25mg   INR 2.9 2.8 2.6 3.5 2.9 3.5 2.5 2.6 2.7 3.0 3.6   Notes                            Date 2/17  3/16 4/17 5/1 5/30 6/23  7/25 8/8 08/22 9/21 10/06   Total Weekly Dose 25mg  25 mg 25 mg  25 mg  25 mg 25mg  25 mg 22.5mg 22.5 mg 22.5 mg 22.5 mg    INR 2.7 3.3 3.5 2.4 3.1 2.9  4.0 2.5 2.9 1.9 2.6   Notes                   Inc glv        Date 10/23 10/30 11/15  11/30  12/15  1/11/24             Total Weekly Dose 22.5 mg  21.25mg 22.5 mg  22.5 mg  22.5mg  22.5 mg              INR 3.4 2.6 2.4  3.2  2.4  3.9              Notes APAP  Rec'd 10/24 Spoke 10/31  Spoke 11/16  APAP  spoke 12/19  Augmentin; spoke 1/12/24                Phone Interview:  Tablet Strength: 5mg; 2.5mg  Patient Contact Info: [phone] (Mobile)  Estimated OOP cost: will send if patient comes to [place]   Verbal Release Auth:   Lab Contact Info: [facility]    Patient Findings  Negatives: Signs/symptoms of thrombosis, Signs/symptoms of bleeding, Laboratory test error suspected, Change in health, Change in alcohol use, Change in activity, Upcoming invasive procedure, Emergency department visit, Upcoming dental procedure, Missed doses, Extra doses, Change in medications, Change in diet/appetite, Hospital admission, Bruising, Other complaints   Comments: Sinusitis; Started Augmentin 1/7/2024 and has 3 days remaining. Patient reports that he is eating well and eating his GLV as normal.     Plan:  1. INR is supratherapeutic today at 3.9 (goal 2.0-3.0) likely due to augmentin. Mr. Houser held warfarin last night, therefore, instructed Mr. Houser to continue warfarin maintenance regimen of warfarin 2.5 mg oral daily except 5 mg WedSat until recheck (21.25 mg/week)   2. Repeat INR in 1 week 1/18.   3. Verbal information provided over the phone. Kurt Houser RBV dosing instructions, expresses understanding by teach back, and has no further questions at this time.     Laura Ames, PharmD  1/11/2024  14:01 EST

## 2024-01-16 DIAGNOSIS — E03.9 ACQUIRED HYPOTHYROIDISM: Primary | ICD-10-CM

## 2024-01-16 RX ORDER — LEVOTHYROXINE SODIUM 0.03 MG/1
25 TABLET ORAL DAILY
Qty: 90 TABLET | Refills: 0 | Status: SHIPPED | OUTPATIENT
Start: 2024-01-16

## 2024-01-17 ENCOUNTER — CLINICAL SUPPORT (OUTPATIENT)
Dept: CARDIOLOGY | Facility: CLINIC | Age: 84
End: 2024-01-17
Payer: MEDICARE

## 2024-01-17 ENCOUNTER — ANTICOAGULATION VISIT (OUTPATIENT)
Dept: PHARMACY | Facility: HOSPITAL | Age: 84
End: 2024-01-17
Payer: MEDICARE

## 2024-01-17 DIAGNOSIS — I48.0 PAF (PAROXYSMAL ATRIAL FIBRILLATION): ICD-10-CM

## 2024-01-17 DIAGNOSIS — I48.20 ATRIAL FIBRILLATION, CHRONIC: Primary | ICD-10-CM

## 2024-01-17 LAB — INR PPP: 3.4 (ref 0.9–1.1)

## 2024-01-17 NOTE — PROGRESS NOTES
Capillary Blood Specimen Collection  Capillary blood collection performed in clinic by Ladi Dover MA. Patient tolerated the procedure well without complications.   01/17/24   Ladi Dover MA

## 2024-01-17 NOTE — PROGRESS NOTES
Anticoagulation Clinic - Remote Progress Note  Remote Lab     Indication: paroxysmal afib  Referring Provider: Buddy Javier  Initial Warfarin Start Date:   Goal INR: 2.0-3.0  Current Drug Interactions: omega three fatty acid; CoQ-10   [id number]: 3 (HTN, Age)     Diet: ~ 2 Tablespoon QOD mixed cooked greens; green beans 2x weekly 1/12/2024  Alcohol: None  Tobacco: None  OTC Pain Medication: APAP PRN     INR History:  Date 3/23 3/30 4/8 4/16 4/23 5/7 6/4 6/18 7/2 7/19 7/28 8/11   Total Weekly Dose 27.5mg 25mg 25mg 25mg 25mg 25mg 25mg 25mg 25mg 25mg 25mg 25mg   INR 3.2 2.7 3.2 2.0 2.4 2.7 1.8 1.8 2.3 1.9 1.9 2.6   Notes     LD doxy 4/8       incr GLV   decr GLV incr GLV  incr GLV decr GLV      Date 8/25 9/22 10/20 11/18 11/29 12/28   3/2/22 3/1/ 4/5/22 4/11 4/27   Total WeeklyDose 25mg 25mg 25mg 25mg 25mg 25mg non- compliant 25mg 25 mg 25 mg 25mg 25 mg   INR 2.1 2.8 2.8 3.1 2.4 2.0   2.9 3.2 3.5 2.5 2.8   Notes       decr GLV       APAP   decr GLV          Date 5/31 6/28 7/26 8/25 9/8 10/6 10/20 11/10 11/30 1/4 2/2   Total Weekly Dose 25 mg 25 mg 25 mg 25 mg 25 mg 25 mg 25mg 25mg 25 mg 25 mg 25mg   INR 2.9 2.8 2.6 3.5 2.9 3.5 2.5 2.6 2.7 3.0 3.6   Notes                            Date 2/17  3/16 4/17 5/1 5/30 6/23  7/25 8/8 08/22 9/21 10/06   Total Weekly Dose 25mg  25 mg 25 mg  25 mg  25 mg 25mg  25 mg 22.5mg 22.5 mg 22.5 mg 22.5 mg    INR 2.7 3.3 3.5 2.4 3.1 2.9  4.0 2.5 2.9 1.9 2.6   Notes                   Inc glv        Date 10/23 10/30 11/15  11/30  12/15  1/11/24   1/17/24               Total Weekly Dose 22.5 mg  21.25mg 22.5 mg  22.5 mg  22.5mg  22.5 mg   20 mg               INR 3.4 2.6 2.4  3.2  2.4  3.9    3.4               Notes APAP  Rec'd 10/24 Spoke 10/31  Spoke 11/16  APAP  spoke 12/19  Augmentin; spoke 1/12/24  holdx1; augmentin                  Phone Interview:  Tablet Strength: 5mg; 2.5mg  Patient Contact Info: [phone] (Mobile)  Estimated OOP cost: will send if patient comes to [place]    Verbal Release Auth:   Lab Contact Info: [facility] PO[facility]      Patient Findings    Positives: Change in medications   Negatives: Signs/symptoms of thrombosis, Signs/symptoms of bleeding, Laboratory test error suspected, Change in health, Change in alcohol use, Change in activity, Upcoming invasive procedure, Emergency department visit, Upcoming dental procedure, Missed doses, Extra doses, Change in diet/appetite, Hospital admission, Bruising, Other complaints   Comments: Completed course of Augmentin for sinus infection  He has been eating broccoli, cauliflower about every other day.  He has been taking 2 of the apap arthritis 650 mg, no more than two tablets a day.    He did not get our message to reduce his dos on Wednesday evening, thus took his regular warfarin 5mg on Wed.         Plan:  1. INR is supratherapeutic from yesterday at 3.4 (goal 2.0-3.0),  Instructed Mr. Houser to hold today's warfarin and then continue warfarin maintenance regimen of warfarin 2.5 mg oral daily except 5 mg WedSat until recheck (21.25 mg/week)   2. Repeat INR in 1 week 1/18.   3. Verbal information provided over the phone. Kurt Houser RBV dosing instructions, expresses understanding by teach back, and has no further questions at this time.     Micky Yeboah, PharmD  1/18/2024  10:47 EST

## 2024-01-24 ENCOUNTER — ANTICOAGULATION VISIT (OUTPATIENT)
Dept: PHARMACY | Facility: HOSPITAL | Age: 84
End: 2024-01-24
Payer: MEDICARE

## 2024-01-24 ENCOUNTER — CLINICAL SUPPORT (OUTPATIENT)
Dept: CARDIOLOGY | Facility: CLINIC | Age: 84
End: 2024-01-24
Payer: MEDICARE

## 2024-01-24 DIAGNOSIS — I48.0 PAF (PAROXYSMAL ATRIAL FIBRILLATION): ICD-10-CM

## 2024-01-24 DIAGNOSIS — I48.20 ATRIAL FIBRILLATION, CHRONIC: Primary | ICD-10-CM

## 2024-01-24 LAB — INR PPP: 2.2 (ref 0.9–1.1)

## 2024-01-24 NOTE — PROGRESS NOTES
Capillary Blood Specimen Collection  Capillary blood collection performed in clinic by Ladi Dover MA. Patient tolerated the procedure well without complications.   01/24/24   Ladi Dover MA

## 2024-01-24 NOTE — PROGRESS NOTES
Anticoagulation Clinic - Remote Progress Note  Remote Lab     Indication: paroxysmal afib  Referring Provider: Buddy Javier  Initial Warfarin Start Date:   Goal INR: 2.0-3.0  Current Drug Interactions: omega three fatty acid; CoQ-10   [id number]: 3 (HTN, Age)     Diet: ~ 2 Tablespoon QOD mixed cooked greens; green beans 2x weekly 1/12/2024  Alcohol: None  Tobacco: None  OTC Pain Medication: APAP PRN     INR History:  Date 3/23 3/30 4/8 4/16 4/23 5/7 6/4 6/18 7/2 7/19 7/28 8/11   Total Weekly Dose 27.5mg 25mg 25mg 25mg 25mg 25mg 25mg 25mg 25mg 25mg 25mg 25mg   INR 3.2 2.7 3.2 2.0 2.4 2.7 1.8 1.8 2.3 1.9 1.9 2.6   Notes     LD doxy 4/8       incr GLV   decr GLV incr GLV  incr GLV decr GLV      Date 8/25 9/22 10/20 11/18 11/29 12/28   3/2/22 3/1/ 4/5/22 4/11 4/27   Total WeeklyDose 25mg 25mg 25mg 25mg 25mg 25mg non- compliant 25mg 25 mg 25 mg 25mg 25 mg   INR 2.1 2.8 2.8 3.1 2.4 2.0   2.9 3.2 3.5 2.5 2.8   Notes       decr GLV       APAP   decr GLV          Date 5/31 6/28 7/26 8/25 9/8 10/6 10/20 11/10 11/30 1/4 2/2   Total Weekly Dose 25 mg 25 mg 25 mg 25 mg 25 mg 25 mg 25mg 25mg 25 mg 25 mg 25mg   INR 2.9 2.8 2.6 3.5 2.9 3.5 2.5 2.6 2.7 3.0 3.6   Notes                            Date 2/17  3/16 4/17 5/1 5/30 6/23  7/25 8/8 08/22 9/21 10/06   Total Weekly Dose 25mg  25 mg 25 mg  25 mg  25 mg 25mg  25 mg 22.5mg 22.5 mg 22.5 mg 22.5 mg    INR 2.7 3.3 3.5 2.4 3.1 2.9  4.0 2.5 2.9 1.9 2.6   Notes                   Inc glv        Date 10/23 10/30 11/15  11/30  12/15  1/11/24   1/17/24  1/24             Total Weekly Dose 22.5 mg  21.25mg 22.5 mg  22.5 mg  22.5mg  22.5 mg   20 mg  22.5 mg             INR 3.4 2.6 2.4  3.2  2.4  3.9    3.4   2.2             Notes APAP  Rec'd 10/24 Spoke 10/31  Spoke 11/16  APAP  spoke 12/19  Augmentin; spoke 1/12/24  holdx1; augmentin                 Phone Interview:  Tablet Strength: 5mg; 2.5mg  Patient Contact Info: [phone] (Mobile)  Estimated OOP cost: will send if patient comes to  [place]   Verbal Release Auth:   Lab Contact Info: [facility]      Patient Findings    Positives: Extra doses   Negatives: Signs/symptoms of thrombosis, Signs/symptoms of bleeding, Laboratory test error suspected, Change in health, Change in alcohol use, Change in activity, Upcoming invasive procedure, Emergency department visit, Upcoming dental procedure, Missed doses, Change in medications, Change in diet/appetite, Hospital admission, Bruising, Other complaints   Comments: Patient did not hold dose last Thursday. Denies any other changes. States he has been eating the same as normal       Plan:    1. INR is therapeutic today at 2.2 (goal 2.0-3.0), Instructed Mr. Houser to resume maintenance regimen of warfarin 2.5 mg oral daily except 5 mg WedSat until recheck    2. Repeat INR in one week 1/31  3. Verbal information provided over the phone. Kurt Houser RBV dosing instructions, expresses understanding by teach back, and has no further questions at this time.     Alexsander Rodgers, PharmD  Pharmacy Resident  1/25/2024  10:33 EST

## 2024-01-31 ENCOUNTER — TELEPHONE (OUTPATIENT)
Dept: FAMILY MEDICINE CLINIC | Facility: CLINIC | Age: 84
End: 2024-01-31
Payer: MEDICARE

## 2024-01-31 NOTE — TELEPHONE ENCOUNTER
PTS WIFE CALLED STATING PT WOKE UP DIZZY AND IT SLAMMED HIM INTO THE WALL, SHE WANTED SOMETHING CALLED IN FOR HIM AND I INFORMED HER DR SHI COULD NOT CALL HIM IN ANYTHING WITHOUT BEING SEEN, AND OFFERED HER A SAME DAY APPOINTMENT AND SHE SAID THAT WOULDN'T WORK. PT IS REQUESTING A CALL FROM DR SHI OR MICHAEL.

## 2024-01-31 NOTE — TELEPHONE ENCOUNTER
Patient had a dizzy spell this morning.  He has never had a one before.  He will drink plenty of fluids.  He denies any chest pain shortness of breath nausea or vomiting.  This recurs or if he has any chest pain shortness of breath to go to emergency room or the office

## 2024-02-05 ENCOUNTER — ANTICOAGULATION VISIT (OUTPATIENT)
Dept: PHARMACY | Facility: HOSPITAL | Age: 84
End: 2024-02-05
Payer: MEDICARE

## 2024-02-05 ENCOUNTER — CLINICAL SUPPORT (OUTPATIENT)
Dept: CARDIOLOGY | Facility: CLINIC | Age: 84
End: 2024-02-05
Payer: MEDICARE

## 2024-02-05 DIAGNOSIS — I48.20 ATRIAL FIBRILLATION, CHRONIC: Primary | ICD-10-CM

## 2024-02-05 LAB — INR PPP: 2.6 (ref 0.9–1.1)

## 2024-02-05 PROCEDURE — 36416 COLLJ CAPILLARY BLOOD SPEC: CPT | Performed by: INTERNAL MEDICINE

## 2024-02-05 NOTE — PROGRESS NOTES
Anticoagulation Clinic - Remote Progress Note  Remote Lab     Indication: paroxysmal afib  Referring Provider: Buddy Javier  Initial Warfarin Start Date:   Goal INR: 2.0-3.0  Current Drug Interactions: omega three fatty acid; CoQ-10   [id number]: 3 (HTN, Age)     Diet: ~ 2 Tablespoon QOD mixed cooked greens; green beans 2x weekly 1/12/2024  Alcohol: None  Tobacco: None  OTC Pain Medication: APAP PRN     INR History:  Date 3/23 3/30 4/8 4/16 4/23 5/7 6/4 6/18 7/2 7/19 7/28 8/11   Total Weekly Dose 27.5mg 25mg 25mg 25mg 25mg 25mg 25mg 25mg 25mg 25mg 25mg 25mg   INR 3.2 2.7 3.2 2.0 2.4 2.7 1.8 1.8 2.3 1.9 1.9 2.6   Notes     LD doxy 4/8       incr GLV   decr GLV incr GLV  incr GLV decr GLV      Date 8/25 9/22 10/20 11/18 11/29 12/28   3/2/22 3/1/ 4/5/22 4/11 4/27   Total WeeklyDose 25mg 25mg 25mg 25mg 25mg 25mg non- compliant 25mg 25 mg 25 mg 25mg 25 mg   INR 2.1 2.8 2.8 3.1 2.4 2.0   2.9 3.2 3.5 2.5 2.8   Notes       decr GLV       APAP   decr GLV          Date 5/31 6/28 7/26 8/25 9/8 10/6 10/20 11/10 11/30 1/4 2/2   Total Weekly Dose 25 mg 25 mg 25 mg 25 mg 25 mg 25 mg 25mg 25mg 25 mg 25 mg 25mg   INR 2.9 2.8 2.6 3.5 2.9 3.5 2.5 2.6 2.7 3.0 3.6   Notes                            Date 2/17  3/16 4/17 5/1 5/30 6/23  7/25 8/8 08/22 9/21 10/06   Total Weekly Dose 25mg  25 mg 25 mg  25 mg  25 mg 25mg  25 mg 22.5mg 22.5 mg 22.5 mg 22.5 mg    INR 2.7 3.3 3.5 2.4 3.1 2.9  4.0 2.5 2.9 1.9 2.6   Notes                   Inc glv        Date 10/23 10/30 11/15  11/30  12/15  1/11/24   1/17/24  1/24  2/5           Total Weekly Dose 22.5 mg  21.25mg 22.5 mg  22.5 mg  22.5mg  22.5 mg   20 mg  22.5 mg  22.5mg           INR 3.4 2.6 2.4  3.2  2.4  3.9    3.4   2.2  2.6           Notes APAP  Rec'd 10/24 Spoke 10/31  Spoke 11/16  APAP  spoke 12/19  Augmentin; spoke 1/12/24  holdx1; augmentin                 Phone Interview:  Tablet Strength: 5mg; 2.5mg  Patient Contact Info: [phone] (Mobile)  Estimated OOP cost: will send if patient  comes to [place]   Verbal Release Auth:   Lab Contact Info: [facility]      Patient Findings  Negatives: Signs/symptoms of thrombosis, Signs/symptoms of bleeding, Laboratory test error suspected, Change in health, Change in alcohol use, Change in activity, Upcoming invasive procedure, Emergency department visit, Upcoming dental procedure, Missed doses, Extra doses, Change in medications, Change in diet/appetite, Hospital admission, Bruising, Other complaints   Comments: All findings negative per patient       Plan:  1. INR is therapeutic today at 2.6 (goal 2.0-3.0), Instructed Mr. Houser to continue maintenance regimen of warfarin 2.5 mg oral daily except 5 mg Wed&Sat until recheck    2. Repeat INR in two weeks 2/19/24  3. Verbal information provided over the phone. Kurt Houser RBV dosing instructions, expresses understanding by teach back, and has no further questions at this time.    Aren Muse, Pharmacy Technician  2/5/2024  13:52 Cielo PUGA, [facility], have reviewed the note in full and agree with the assessment and plan.  02/05/24  14:54 EST

## 2024-02-05 NOTE — PROGRESS NOTES
Capillary Blood Specimen Collection  Capillary blood collection performed in clinic by Ladi Dover MA. Patient tolerated the procedure well without complications.   02/05/24   Ladi Dover MA

## 2024-02-19 RX ORDER — ROSUVASTATIN CALCIUM 20 MG/1
20 TABLET, COATED ORAL DAILY
Qty: 30 TABLET | Refills: 0 | Status: SHIPPED | OUTPATIENT
Start: 2024-02-19

## 2024-02-21 ENCOUNTER — ANTICOAGULATION VISIT (OUTPATIENT)
Dept: PHARMACY | Facility: HOSPITAL | Age: 84
End: 2024-02-21
Payer: MEDICARE

## 2024-02-21 ENCOUNTER — CLINICAL SUPPORT (OUTPATIENT)
Dept: CARDIOLOGY | Facility: CLINIC | Age: 84
End: 2024-02-21
Payer: MEDICARE

## 2024-02-21 DIAGNOSIS — I48.20 ATRIAL FIBRILLATION, CHRONIC: Primary | ICD-10-CM

## 2024-02-21 LAB — INR PPP: 2.4 (ref 0.9–1.1)

## 2024-02-21 NOTE — PROGRESS NOTES
Capillary Blood Specimen Collection  Capillary blood collection performed in clinic by Ladi Dover MA. Patient tolerated the procedure well without complications.   02/21/24   Ladi Dover MA

## 2024-02-21 NOTE — PROGRESS NOTES
Anticoagulation Clinic - Remote Progress Note  Remote Lab     Indication: paroxysmal afib  Referring Provider: Buddy Javier  Goal INR: 2.0-3.0  Current Drug Interactions: omega three fatty acid; CoQ-10   [id number]: 3 (HTN, Age)     Diet: ~ 2 Tablespoon QOD mixed cooked greens; green beans 2x weekly 1/12/2024  Alcohol: None  Tobacco: None  OTC Pain Medication: APAP PRN     INR History:  Date 3/23 3/30 4/8 4/16 4/23 5/7 6/4 6/18 7/2 7/19 7/28 8/11   Total Weekly Dose 27.5mg 25mg 25mg 25mg 25mg 25mg 25mg 25mg 25mg 25mg 25mg 25mg   INR 3.2 2.7 3.2 2.0 2.4 2.7 1.8 1.8 2.3 1.9 1.9 2.6   Notes     LD doxy 4/8       incr GLV   decr GLV incr GLV  incr GLV decr GLV      Date 8/25 9/22 10/20 11/18 11/29 12/28   3/2/22 3/1/ 4/5/22 4/11 4/27   Total WeeklyDose 25mg 25mg 25mg 25mg 25mg 25mg non- compliant 25mg 25 mg 25 mg 25mg 25 mg   INR 2.1 2.8 2.8 3.1 2.4 2.0   2.9 3.2 3.5 2.5 2.8   Notes       decr GLV       APAP   decr GLV          Date 5/31 6/28 7/26 8/25 9/8 10/6 10/20 11/10 11/30 1/4 2/2   Total Weekly Dose 25 mg 25 mg 25 mg 25 mg 25 mg 25 mg 25mg 25mg 25 mg 25 mg 25mg   INR 2.9 2.8 2.6 3.5 2.9 3.5 2.5 2.6 2.7 3.0 3.6   Notes                            Date 2/17  3/16 4/17 5/1 5/30 6/23  7/25 8/8 08/22 9/21 10/06   Total Weekly Dose 25mg  25 mg 25 mg  25 mg  25 mg 25mg  25 mg 22.5mg 22.5 mg 22.5 mg 22.5 mg    INR 2.7 3.3 3.5 2.4 3.1 2.9  4.0 2.5 2.9 1.9 2.6   Notes                   Inc glv        Date 10/23 10/30 11/15  11/30  12/15  1/11/24   1/17/24  1/24  2/5  2/21         Total Weekly Dose 22.5 mg  21.25mg 22.5 mg  22.5 mg  22.5mg  22.5 mg   20 mg  22.5 mg  22.5mg           INR 3.4 2.6 2.4  3.2  2.4  3.9    3.4   2.2  2.6  2.4         Notes APAP  Rec'd 10/24 Spoke 10/31  Spoke 11/16  APAP  spoke 12/19  Augmentin; spoke 1/12/24  holdx1; augmentin                 Phone Interview:  Tablet Strength: 5mg; 2.5mg  Patient Contact Info: [phone] (Mobile)  Estimated OOP cost: will send if patient comes to [place]    Verbal Release Auth:   Lab Contact Info: [facility] POCT      Patient Findings        Plan:  1. INR is therapeutic today at 2.4 (goal 2.0-3.0). Instructed Mr. Houser to continue maintenance regimen of warfarin 2.5 mg oral daily except 5 mg Wed&Sat until recheck.  2. Repeat INR in four weeks, 3/20.  3. Verbal information provided over the phone. Kurt Houser RBV dosing instructions, expresses understanding by teach back, and has no further questions at this time.    Harman Salas, PharmD, BCPS  2/22/2024  10:06 EST

## 2024-02-27 DIAGNOSIS — I10 HYPERTENSION, ESSENTIAL: ICD-10-CM

## 2024-02-27 RX ORDER — LISINOPRIL 40 MG/1
40 TABLET ORAL DAILY
Qty: 90 TABLET | Refills: 0 | Status: SHIPPED | OUTPATIENT
Start: 2024-02-27

## 2024-02-28 RX ORDER — WARFARIN SODIUM 2.5 MG/1
TABLET ORAL
Qty: 45 TABLET | Refills: 0 | Status: SHIPPED | OUTPATIENT
Start: 2024-02-28

## 2024-02-28 NOTE — TELEPHONE ENCOUNTER
Duplicate encounter. Please disregard.     Madina Greco, [facility]  02/28/24

## 2024-02-29 ENCOUNTER — TELEPHONE (OUTPATIENT)
Dept: CARDIOLOGY | Facility: CLINIC | Age: 84
End: 2024-02-29
Payer: MEDICARE

## 2024-02-29 ENCOUNTER — TELEPHONE (OUTPATIENT)
Dept: PHARMACY | Facility: HOSPITAL | Age: 84
End: 2024-02-29
Payer: MEDICARE

## 2024-02-29 NOTE — TELEPHONE ENCOUNTER
Spoke with Mrs Houser and she advised MR. Houser not having any cardiac issues or complaints at present time.  I have sent a message to Dr. Javier for clearance and coumadin recommendation.

## 2024-02-29 NOTE — TELEPHONE ENCOUNTER
Spoke with Mrs Houser and advised her that Dr. Javier advised he is a low risk for the carpal tunnel sx, however he would like him to come over for an EKG.  She verbalized understanding. I advised that the coumadin clinic will work with him on when to come of the coumadin.

## 2024-02-29 NOTE — TELEPHONE ENCOUNTER
----- Message from Buddy Javier MD sent at 2/29/2024 12:10 PM EST -----  Regarding: RE: cardiac clearance and coumadin  Low risk, but ask him to come for EKG as he has PAF  ----- Message -----  From: Jossie Naqvi RN  Sent: 2/29/2024  10:41 AM EST  To: Buddy Javier MD  Subject: cardiac clearance and coumadin                   Having carpal tunnel surgery in several weeks and needs clearance for this and to come off coumadin.  I have sent a message to coumadin clinic as well.      Spoke with Mr. Houser and he is not having any cardiac issues or complaints at this time.      History of Present Illness:  Kurt Houser is a 83 y.o. male who presents to the clinic for Follow-up.Paroxysmal atrial fibrillation- He is asymptomatic today in sinus just anticoagulation Warfarin INR is 3,2    Diagnostic Data     ECG 12 Lead     Date/Time: 11/30/2023 1:47 PM  Performed by: Buddy Javier MD     Authorized by: Buddy Javier MD  Comparison: compared with previous ECG   Rhythm: sinus rhythm  Rate: normal  BPM: 74  QRS axis: normal     Clinical impression: normal ECG              Assessment and Plan  Diagnoses and all orders for this visit:     Atrial fibrillation, paroxysmal-  no complaints in sinus only warfarin  -     POC Protime / INR     Hypertension, essential- BP is 110.60 on Lisinopril 40 mg, Amlodipine 5 mg     Hyperlipidemia, mixed- On Crestor 20 mg, he is tolerating well , needs a Lipid           Return in about 6 months (around 5/30/2024) for Recheck with Dr. Javier.     Buddy Javier MD  11/30/2023

## 2024-02-29 NOTE — TELEPHONE ENCOUNTER
LVM at Dr. Galindo's office [phone] regarding Mr. Houser's upcoming carpal tunnel procedure and requested warfarin.    Amisha Santana, BeanD

## 2024-02-29 NOTE — TELEPHONE ENCOUNTER
Pt having Carpal Tunnel surgery in a few weeks with Dr Galindo. Pt needs clearance to go off his Warfarin. Mateo's office fax is [phone]

## 2024-03-04 ENCOUNTER — CLINICAL SUPPORT (OUTPATIENT)
Dept: CARDIOLOGY | Facility: CLINIC | Age: 84
End: 2024-03-04
Payer: MEDICARE

## 2024-03-04 DIAGNOSIS — I48.20 ATRIAL FIBRILLATION, CHRONIC: Primary | ICD-10-CM

## 2024-03-04 PROCEDURE — 93000 ELECTROCARDIOGRAM COMPLETE: CPT | Performed by: INTERNAL MEDICINE

## 2024-03-04 NOTE — TELEPHONE ENCOUNTER
Dr Javier,    I spoke with Dedra at Dr. Galindo's office. He has requested a 5 day warfarin hold for carpal tunnel release on 3/12, ok to resume POD#0. Given indication of afib, I recommend the following plan:    3/7: begin warfarin hold  3/12: operation, resume warfarin with 2.5mg dose  3/13: warfarin 5mg  3/14 warfarin 5mg  3/15: re-check INR    William Hutson, PharmD  3/4/2024  10:17 EST

## 2024-03-05 NOTE — PROGRESS NOTES
ECG 12 Lead    Date/Time: 3/4/2024 8:46 PM  Performed by: Buddy Javier MD    Authorized by: Buddy Javier MD  Comparison: compared with previous ECG from 11/30/2023  Similar to previous ECG  Rhythm: sinus rhythm  Rate: normal  BPM: 65  Conduction: non-specific intraventricular conduction delay  QRS axis: normal    Clinical impression: abnormal EKG

## 2024-03-15 ENCOUNTER — CLINICAL SUPPORT (OUTPATIENT)
Dept: CARDIOLOGY | Facility: CLINIC | Age: 84
End: 2024-03-15
Payer: MEDICARE

## 2024-03-15 ENCOUNTER — ANTICOAGULATION VISIT (OUTPATIENT)
Dept: PHARMACY | Facility: HOSPITAL | Age: 84
End: 2024-03-15
Payer: MEDICARE

## 2024-03-15 DIAGNOSIS — I48.20 ATRIAL FIBRILLATION, CHRONIC: Primary | ICD-10-CM

## 2024-03-15 LAB — INR PPP: 1.6 (ref 0.9–1.1)

## 2024-03-15 PROCEDURE — 36416 COLLJ CAPILLARY BLOOD SPEC: CPT | Performed by: INTERNAL MEDICINE

## 2024-03-15 PROCEDURE — 85610 PROTHROMBIN TIME: CPT | Performed by: INTERNAL MEDICINE

## 2024-03-15 NOTE — PROGRESS NOTES
Capillary Blood Specimen Collection  Capillary blood collection performed in clinic by Ladi Dover MA. Patient tolerated the procedure well without complications.   03/15/24   Ladi Dover MA

## 2024-03-15 NOTE — PROGRESS NOTES
Anticoagulation Clinic - Remote Progress Note  Remote Lab     Indication: paroxysmal afib  Referring Provider: Buddy Javier  Goal INR: 2.0-3.0  Current Drug Interactions: omega three fatty acid; CoQ-10   [id number]: 3 (HTN, Age)     Diet: ~ 2 Tablespoon QOD mixed cooked greens; green beans 2x weekly 1/12/2024  Alcohol: None  Tobacco: None  OTC Pain Medication: APAP PRN     INR History:  Date 3/23 3/30 4/8 4/16 4/23 5/7 6/4 6/18 7/2 7/19 7/28 8/11   Total Weekly Dose 27.5mg 25mg 25mg 25mg 25mg 25mg 25mg 25mg 25mg 25mg 25mg 25mg   INR 3.2 2.7 3.2 2.0 2.4 2.7 1.8 1.8 2.3 1.9 1.9 2.6   Notes     LD doxy 4/8       incr GLV   decr GLV incr GLV  incr GLV decr GLV      Date 8/25 9/22 10/20 11/18 11/29 12/28   3/2/22 3/1/ 4/5/22 4/11 4/27   Total WeeklyDose 25mg 25mg 25mg 25mg 25mg 25mg non- compliant 25mg 25 mg 25 mg 25mg 25 mg   INR 2.1 2.8 2.8 3.1 2.4 2.0   2.9 3.2 3.5 2.5 2.8   Notes       decr GLV       APAP   decr GLV          Date 5/31 6/28 7/26 8/25 9/8 10/6 10/20 11/10 11/30 1/4 2/2   Total Weekly Dose 25 mg 25 mg 25 mg 25 mg 25 mg 25 mg 25mg 25mg 25 mg 25 mg 25mg   INR 2.9 2.8 2.6 3.5 2.9 3.5 2.5 2.6 2.7 3.0 3.6   Notes                            Date 2/17  3/16 4/17 5/1 5/30 6/23  7/25 8/8 08/22 9/21 10/06   Total Weekly Dose 25mg  25 mg 25 mg  25 mg  25 mg 25mg  25 mg 22.5mg 22.5 mg 22.5 mg 22.5 mg    INR 2.7 3.3 3.5 2.4 3.1 2.9  4.0 2.5 2.9 1.9 2.6   Notes                   Inc glv        Date 10/23 10/30 11/15  11/30  12/15  1/11/24   1/17/24  1/24  2/5  2/21  3/15   Total Weekly Dose 22.5 mg  21.25mg 22.5 mg  22.5 mg  22.5mg  22.5 mg   20 mg  22.5 mg  22.5mg 22.5 mg  10 mg   INR 3.4 2.6 2.4  3.2  2.4  3.9    3.4   2.2  2.6  2.4  1.6   Notes APAP  Rec'd 10/24 Spoke 10/31  Spoke 11/16  APAP  spoke 12/19  Augmentin; spoke 1/12/24  holdx1; augmentin      hold x4, no GLV      Phone Interview:  Tablet Strength: 5mg; 2.5mg  Patient Contact Info: [phone] (Mobile)  Estimated OOP cost: will send if patient comes  to [place]   Verbal Release Auth:   Lab Contact Info: [facility] POCT      Patient Findings  Positives: Change in health, Missed doses, Change in diet/appetite   Negatives: Signs/symptoms of thrombosis, Signs/symptoms of bleeding, Laboratory test error suspected, Change in alcohol use, Change in activity, Upcoming invasive procedure, Emergency department visit, Upcoming dental procedure, Extra doses, Change in medications, Hospital admission, Bruising, Other complaints   Comments: Patient s/p carpal tunnel surgery last Tuesday, held warfarin for 5 days at instruction of MD and restarted night of procedure 3/12 at typical dose. Patient typically has a small amount of greens every other day but hadn't restarted this since his procedure since he knew INR was low. No other changes.       Plan:  1. INR is subtherapeutic Friday 3/15 at 1.6 (goal 2.0-3.0) following 5x hold for surgery. Unable to d/w patient until Monday 3/18. Instructed Mr. Houser to continue maintenance regimen of warfarin 2.5 mg oral daily except 5 mg Wed&Sat until recheck. Not boosting doses any as suspect INR would be improved by today as we get further from days held.  2. Repeat INR Friday 3/22.  3. Verbal information provided over the phone. Kurt Houser RBV dosing instructions, expresses understanding by teach back, and has no further questions at this time.    Harman Salas, PharmD, BCPS  3/18/2024  09:53 EDT

## 2024-03-18 ENCOUNTER — ANTICOAGULATION VISIT (OUTPATIENT)
Dept: PHARMACY | Facility: HOSPITAL | Age: 84
End: 2024-03-18
Payer: MEDICARE

## 2024-03-18 LAB — INR PPP: 1.6

## 2024-03-18 RX ORDER — ROSUVASTATIN CALCIUM 20 MG/1
20 TABLET, COATED ORAL DAILY
Qty: 90 TABLET | Refills: 1 | Status: SHIPPED | OUTPATIENT
Start: 2024-03-18

## 2024-03-22 ENCOUNTER — CLINICAL SUPPORT (OUTPATIENT)
Dept: CARDIOLOGY | Facility: CLINIC | Age: 84
End: 2024-03-22
Payer: MEDICARE

## 2024-03-22 ENCOUNTER — ANTICOAGULATION VISIT (OUTPATIENT)
Dept: PHARMACY | Facility: HOSPITAL | Age: 84
End: 2024-03-22
Payer: MEDICARE

## 2024-03-22 DIAGNOSIS — I48.20 ATRIAL FIBRILLATION, CHRONIC: Primary | ICD-10-CM

## 2024-03-22 LAB — INR PPP: 2.9 (ref 0.9–1.1)

## 2024-03-22 NOTE — PROGRESS NOTES
Capillary Blood Specimen Collection  Capillary blood collection performed in clinic by Arti Chin MA. Patient tolerated the procedure well without complications.   03/22/24   Arti Chin MA

## 2024-03-25 ENCOUNTER — ANTICOAGULATION VISIT (OUTPATIENT)
Dept: PHARMACY | Facility: HOSPITAL | Age: 84
End: 2024-03-25
Payer: MEDICARE

## 2024-03-25 NOTE — PROGRESS NOTES
Anticoagulation Clinic - Remote Progress Note  Remote Lab     Indication: paroxysmal afib  Referring Provider: Buddy Javier  Goal INR: 2.0-3.0  Current Drug Interactions: omega three fatty acid; CoQ-10   [id number]: 3 (HTN, Age)     Diet: ~ 2 Tablespoon QOD mixed cooked greens; green beans 2x weekly 1/12/2024  Alcohol: None  Tobacco: None  OTC Pain Medication: APAP PRN     INR History:  Date 3/23 3/30 4/8 4/16 4/23 5/7 6/4 6/18 7/2 7/19 7/28 8/11   Total Weekly Dose 27.5mg 25mg 25mg 25mg 25mg 25mg 25mg 25mg 25mg 25mg 25mg 25mg   INR 3.2 2.7 3.2 2.0 2.4 2.7 1.8 1.8 2.3 1.9 1.9 2.6   Notes     LD doxy 4/8       incr GLV   decr GLV incr GLV  incr GLV decr GLV      Date 8/25 9/22 10/20 11/18 11/29 12/28   3/2/22 3/1/ 4/5/22 4/11 4/27   Total WeeklyDose 25mg 25mg 25mg 25mg 25mg 25mg non- compliant 25mg 25 mg 25 mg 25mg 25 mg   INR 2.1 2.8 2.8 3.1 2.4 2.0   2.9 3.2 3.5 2.5 2.8   Notes       decr GLV       APAP   decr GLV          Date 5/31 6/28 7/26 8/25 9/8 10/6 10/20 11/10 11/30 1/4 2/2   Total Weekly Dose 25 mg 25 mg 25 mg 25 mg 25 mg 25 mg 25mg 25mg 25 mg 25 mg 25mg   INR 2.9 2.8 2.6 3.5 2.9 3.5 2.5 2.6 2.7 3.0 3.6   Notes                            Date 2/17  3/16 4/17 5/1 5/30 6/23  7/25 8/8 08/22 9/21 10/06   Total Weekly Dose 25mg  25 mg 25 mg  25 mg  25 mg 25mg  25 mg 22.5mg 22.5 mg 22.5 mg 22.5 mg    INR 2.7 3.3 3.5 2.4 3.1 2.9  4.0 2.5 2.9 1.9 2.6   Notes                   Inc glv        Date 10/23 10/30 11/15  11/30  12/15  1/11/24   1/17/24  1/24  2/5  2/21  3/15  3/22      Total Weekly Dose 22.5 mg  21.25mg 22.5 mg  22.5 mg  22.5mg  22.5 mg   20 mg  22.5 mg  22.5mg 22.5 mg  10 mg  22.5 mg      INR 3.4 2.6 2.4  3.2  2.4  3.9    3.4   2.2  2.6  2.4  1.6  2.9      Notes APAP  Rec'd 10/24 Spoke 10/31  Spoke 11/16  APAP  spoke 12/19  Augmentin; spoke 1/12/24  holdx1; augmentin      hold x5, no GLV          Date                   Total Weekly Dose                  INR                  Notes                     Phone Interview:  Tablet Strength: 5mg; 2.5mg  Patient Contact Info: [phone] (Mobile)  Estimated OOP cost: will send if patient comes to [place]   Verbal Release Auth:   Lab Contact Info: [facility] PO[facility]    Patient Findings  Negatives: Signs/symptoms of thrombosis, Signs/symptoms of bleeding, Laboratory test error suspected, Change in health, Change in alcohol use, Change in activity, Upcoming invasive procedure, Emergency department visit, Upcoming dental procedure, Missed doses, Extra doses, Change in medications, Change in diet/appetite, Hospital admission, Bruising, Other complaints   Comments: All findings negative per patient.       Plan:  1. INR was therapeutic Friday at 2.9 (goal 2.0-3.0).  Unable to reach Mr. Houser until today.  Instructed Mr. Houser to continue maintenance regimen of warfarin 2.5 mg oral daily except 5 mg WedSat until recheck.   2. Repeat INR Friday 4/5.  3. Verbal information provided over the phone. Kurt Houser RBV dosing instructions, expresses understanding by teach back, and has no further questions at this time.    Ana Mason CPhT   09:17 EDT 3/25/2024    IAmisha, PharmD, have reviewed the note in full and agree with the assessment and plan.  03/25/24  10:48 EDT

## 2024-04-09 ENCOUNTER — ANTICOAGULATION VISIT (OUTPATIENT)
Dept: PHARMACY | Facility: HOSPITAL | Age: 84
End: 2024-04-09
Payer: MEDICARE

## 2024-04-09 ENCOUNTER — CLINICAL SUPPORT (OUTPATIENT)
Dept: CARDIOLOGY | Facility: CLINIC | Age: 84
End: 2024-04-09
Payer: MEDICARE

## 2024-04-09 DIAGNOSIS — I48.20 ATRIAL FIBRILLATION, CHRONIC: Primary | ICD-10-CM

## 2024-04-09 DIAGNOSIS — I48.0 PAF (PAROXYSMAL ATRIAL FIBRILLATION): ICD-10-CM

## 2024-04-09 LAB — INR PPP: 3.3 (ref 0.9–1.1)

## 2024-04-09 PROCEDURE — 36416 COLLJ CAPILLARY BLOOD SPEC: CPT | Performed by: INTERNAL MEDICINE

## 2024-04-09 NOTE — PROGRESS NOTES
Anticoagulation Clinic - Remote Progress Note  Remote Lab     Indication: paroxysmal afib  Referring Provider: Buddy Javier  Goal INR: 2.0-3.0  Current Drug Interactions: omega three fatty acid; CoQ-10   [id number]: 3 (HTN, Age)     Diet: ~ 2 Tablespoon QOD mixed cooked greens; green beans 2x weekly 1/12/2024  Alcohol: None  Tobacco: None  OTC Pain Medication: APAP PRN     INR History:  Date 3/23 3/30 4/8 4/16 4/23 5/7 6/4 6/18 7/2 7/19 7/28 8/11   Total Weekly Dose 27.5mg 25mg 25mg 25mg 25mg 25mg 25mg 25mg 25mg 25mg 25mg 25mg   INR 3.2 2.7 3.2 2.0 2.4 2.7 1.8 1.8 2.3 1.9 1.9 2.6   Notes     LD doxy 4/8       incr GLV   decr GLV incr GLV  incr GLV decr GLV      Date 8/25 9/22 10/20 11/18 11/29 12/28   3/2/22 3/1/ 4/5/22 4/11 4/27   Total WeeklyDose 25mg 25mg 25mg 25mg 25mg 25mg non- compliant 25mg 25 mg 25 mg 25mg 25 mg   INR 2.1 2.8 2.8 3.1 2.4 2.0   2.9 3.2 3.5 2.5 2.8   Notes       decr GLV       APAP   decr GLV          Date 5/31 6/28 7/26 8/25 9/8 10/6 10/20 11/10 11/30 1/4 2/2   Total Weekly Dose 25 mg 25 mg 25 mg 25 mg 25 mg 25 mg 25mg 25mg 25 mg 25 mg 25mg   INR 2.9 2.8 2.6 3.5 2.9 3.5 2.5 2.6 2.7 3.0 3.6   Notes                            Date 2/17  3/16 4/17 5/1 5/30 6/23  7/25 8/8 08/22 9/21 10/06   Total Weekly Dose 25mg  25 mg 25 mg  25 mg  25 mg 25mg  25 mg 22.5mg 22.5 mg 22.5 mg 22.5 mg    INR 2.7 3.3 3.5 2.4 3.1 2.9  4.0 2.5 2.9 1.9 2.6   Notes                   Inc glv        Date 10/23 10/30 11/15  11/30  12/15  1/11/24   1/17/24  1/24  2/5  2/21  3/15  3/22 4/9     Total Weekly Dose 22.5 mg  21.25mg 22.5 mg  22.5 mg  22.5mg  22.5 mg   20 mg  22.5 mg  22.5mg 22.5 mg  10 mg  22.5 mg 22.5 mg     INR 3.4 2.6 2.4  3.2  2.4  3.9    3.4   2.2  2.6  2.4  1.6  2.9 3.3     Notes APAP  Rec'd 10/24 Spoke 10/31  Spoke 11/16  APAP  spoke 12/19  Augmentin; spoke 1/12/24  holdx1; augmentin      hold x5, no GLV  Talked to pt 4/10        Date                   Total Weekly Dose                  INR                   Notes                    Phone Interview:  Tablet Strength: 5mg; 2.5mg  Patient Contact Info: [phone] (Mobile)  Estimated OOP cost: will send if patient comes to [place]   Verbal Release Auth:   Lab Contact Info: [facility] PO[facility]    nt Findings    Positives: Missed doses (Self-adjusted for high INR)   Negatives: Signs/symptoms of thrombosis, Signs/symptoms of bleeding, Laboratory test error suspected, Change in health, Change in alcohol use, Change in activity, Upcoming invasive procedure, Emergency department visit, Upcoming dental procedure, Extra doses, Change in medications, Change in diet/appetite, Hospital admission, Bruising, Other complaints   Comments: Patient reports he self-adjusted and HELD his dose yesterday. He has added a magnesium supplement to his medications. All other findings negative.       Plan:  1. INR was supratherapeutic today at 3.3 (goal 2.0-3.0). Since patient already held yesterday's 2.5 mg dose, Instructed Mr. Houser to continue maintenance regimen of warfarin 2.5 mg oral daily except 5 mg WedSat until recheck. He will continue to eat GLV often, as he normally does.   2. Repeat INR 4/16/24.  3. Verbal information provided over the phone. Kurt Houser RBV dosing instructions, expresses understanding by teach back, and has no further questions at this time.    Madina Greco, PharmD  04/10/24   09:23 EDT

## 2024-04-09 NOTE — PROGRESS NOTES
Capillary Blood Specimen Collection  Capillary blood collection performed in clinic by Ladi Dover MA. Patient tolerated the procedure well without complications.   04/09/24   Ladi Dover MA

## 2024-04-11 DIAGNOSIS — E03.9 ACQUIRED HYPOTHYROIDISM: ICD-10-CM

## 2024-04-11 DIAGNOSIS — G62.9 NEUROPATHY: ICD-10-CM

## 2024-04-11 RX ORDER — LEVOTHYROXINE SODIUM 0.03 MG/1
25 TABLET ORAL DAILY
Qty: 90 TABLET | Refills: 0 | Status: SHIPPED | OUTPATIENT
Start: 2024-04-11

## 2024-04-11 RX ORDER — GABAPENTIN 600 MG/1
1200 TABLET ORAL 3 TIMES DAILY
Qty: 180 TABLET | Refills: 0 | Status: SHIPPED | OUTPATIENT
Start: 2024-04-11

## 2024-04-18 ENCOUNTER — CLINICAL SUPPORT (OUTPATIENT)
Dept: CARDIOLOGY | Facility: CLINIC | Age: 84
End: 2024-04-18
Payer: MEDICARE

## 2024-04-18 ENCOUNTER — ANTICOAGULATION VISIT (OUTPATIENT)
Dept: PHARMACY | Facility: HOSPITAL | Age: 84
End: 2024-04-18
Payer: MEDICARE

## 2024-04-18 DIAGNOSIS — I48.20 ATRIAL FIBRILLATION, CHRONIC: Primary | ICD-10-CM

## 2024-04-18 DIAGNOSIS — Z79.01 LONG TERM (CURRENT) USE OF ANTICOAGULANTS: ICD-10-CM

## 2024-04-18 DIAGNOSIS — I48.0 PAF (PAROXYSMAL ATRIAL FIBRILLATION): ICD-10-CM

## 2024-04-18 LAB
INR PPP: 2.8 (ref 0.9–1.1)
PROTHROMBIN TIME: 2.8 SECONDS

## 2024-04-18 NOTE — PROGRESS NOTES
Capillary Blood Specimen Collection  Capillary blood collection performed in clinic by Dinah العراقي MA. Patient tolerated the procedure well without complications.   04/18/24   Dinah العراقي MA

## 2024-04-18 NOTE — PROGRESS NOTES
Anticoagulation Clinic - Remote Progress Note  Remote Lab     Indication: paroxysmal afib  Referring Provider: Buddy Javier  Goal INR: 2.0-3.0  Current Drug Interactions: omega three fatty acid; CoQ-10   [id number]: 3 (HTN, Age)     Diet: ~ 2 Tablespoon QOD mixed cooked greens; green beans 2x weekly 1/12/2024  Alcohol: None  Tobacco: None  OTC Pain Medication: APAP PRN     INR History:  Date 3/23 3/30 4/8 4/16 4/23 5/7 6/4 6/18 7/2 7/19 7/28 8/11   Total Weekly Dose 27.5mg 25mg 25mg 25mg 25mg 25mg 25mg 25mg 25mg 25mg 25mg 25mg   INR 3.2 2.7 3.2 2.0 2.4 2.7 1.8 1.8 2.3 1.9 1.9 2.6   Notes     LD doxy 4/8       incr GLV   decr GLV incr GLV  incr GLV decr GLV      Date 8/25 9/22 10/20 11/18 11/29 12/28   3/2/22 3/1/ 4/5/22 4/11 4/27   Total WeeklyDose 25mg 25mg 25mg 25mg 25mg 25mg non- compliant 25mg 25 mg 25 mg 25mg 25 mg   INR 2.1 2.8 2.8 3.1 2.4 2.0   2.9 3.2 3.5 2.5 2.8   Notes       decr GLV       APAP   decr GLV          Date 5/31 6/28 7/26 8/25 9/8 10/6 10/20 11/10 11/30 1/4 2/2   Total Weekly Dose 25 mg 25 mg 25 mg 25 mg 25 mg 25 mg 25mg 25mg 25 mg 25 mg 25mg   INR 2.9 2.8 2.6 3.5 2.9 3.5 2.5 2.6 2.7 3.0 3.6   Notes                            Date 2/17  3/16 4/17 5/1 5/30 6/23  7/25 8/8 08/22 9/21 10/06   Total Weekly Dose 25mg  25 mg 25 mg  25 mg  25 mg 25mg  25 mg 22.5mg 22.5 mg 22.5 mg 22.5 mg    INR 2.7 3.3 3.5 2.4 3.1 2.9  4.0 2.5 2.9 1.9 2.6   Notes                   Inc glv        Date 10/23 10/30 11/15  11/30  12/15  1/11/24   1/17/24  1/24  2/5  2/21  3/15  3/22 4/9 4/18    Total Weekly Dose 22.5 mg  21.25mg 22.5 mg  22.5 mg  22.5mg  22.5 mg   20 mg  22.5 mg  22.5mg 22.5 mg  10 mg  22.5 mg 22.5 mg 20 mg    INR 3.4 2.6 2.4  3.2  2.4  3.9    3.4   2.2  2.6  2.4  1.6  2.9 3.3 2.8    Notes APAP  Rec'd 10/24 Spoke 10/31  Spoke 11/16  APAP  spoke 12/19  Augmentin; spoke 1/12/24  holdx1; augmentin      hold x5, no GLV  Talked to pt 4/10        Date                   Total Weekly Dose                  INR                   Notes                    Phone Interview:  Tablet Strength: 5mg; 2.5mg  Patient Contact Info: [phone] (Mobile)  Estimated OOP cost: will send if patient comes to [place]   Verbal Release Auth:   Lab Contact Info: [facility] PO[facility]    Patient Findings    Negatives: Signs/symptoms of thrombosis, Signs/symptoms of bleeding, Laboratory test error suspected, Change in health, Change in alcohol use, Change in activity, Upcoming invasive procedure, Emergency department visit, Upcoming dental procedure, Missed doses, Extra doses, Change in medications, Change in diet/appetite, Hospital admission, Bruising, Other complaints   Comments: All findings negative per patient.     Plan:  1. INR is therapeutic today at 2.8 (goal 2.0-3.0). Instructed Mr. Houser to continue warfarin 2.5 mg oral daily except 5 mg WedSat until recheck.   2. Repeat INR in 1 week, 4/25/24.  3. Verbal information provided over the phone. Kurt Houser RBV dosing instructions, expresses understanding by teach back, and has no further questions at this time.    Ana Mason CPhT   10:11 EDT 4/19/2024    IWilliam, PharmD, have reviewed the note in full and agree with the assessment and plan.  04/19/24  11:54 EDT

## 2024-04-24 ENCOUNTER — CLINICAL SUPPORT (OUTPATIENT)
Dept: CARDIOLOGY | Facility: CLINIC | Age: 84
End: 2024-04-24
Payer: MEDICARE

## 2024-04-24 ENCOUNTER — ANTICOAGULATION VISIT (OUTPATIENT)
Dept: PHARMACY | Facility: HOSPITAL | Age: 84
End: 2024-04-24
Payer: MEDICARE

## 2024-04-24 DIAGNOSIS — I48.20 ATRIAL FIBRILLATION, CHRONIC: Primary | ICD-10-CM

## 2024-04-24 LAB — INR PPP: 2.4 (ref 0.9–1.1)

## 2024-04-24 NOTE — PROGRESS NOTES
Capillary Blood Specimen Collection  Capillary blood collection performed in clinic by Arti Chin MA. Patient tolerated the procedure well without complications.   04/24/24   Arti Chin MA

## 2024-04-24 NOTE — PROGRESS NOTES
Anticoagulation Clinic - Remote Progress Note  Remote Lab     Indication: paroxysmal afib  Referring Provider: Buddy Javier  Goal INR: 2.0-3.0  Current Drug Interactions: omega three fatty acid; CoQ-10   [id number]: 3 (HTN, Age)     Diet: ~ 2 Tablespoon QOD mixed cooked greens; green beans 2x weekly 1/12/2024  Alcohol: None  Tobacco: None  OTC Pain Medication: APAP PRN     INR History:  Date 3/23 3/30 4/8 4/16 4/23 5/7 6/4 6/18 7/2 7/19 7/28 8/11   Total Weekly Dose 27.5mg 25mg 25mg 25mg 25mg 25mg 25mg 25mg 25mg 25mg 25mg 25mg   INR 3.2 2.7 3.2 2.0 2.4 2.7 1.8 1.8 2.3 1.9 1.9 2.6   Notes     LD doxy 4/8       incr GLV   decr GLV incr GLV  incr GLV decr GLV      Date 8/25 9/22 10/20 11/18 11/29 12/28   3/2/22 3/1/ 4/5/22 4/11 4/27   Total WeeklyDose 25mg 25mg 25mg 25mg 25mg 25mg non- compliant 25mg 25 mg 25 mg 25mg 25 mg   INR 2.1 2.8 2.8 3.1 2.4 2.0   2.9 3.2 3.5 2.5 2.8   Notes       decr GLV       APAP   decr GLV          Date 5/31 6/28 7/26 8/25 9/8 10/6 10/20 11/10 11/30 1/4 2/2   Total Weekly Dose 25 mg 25 mg 25 mg 25 mg 25 mg 25 mg 25mg 25mg 25 mg 25 mg 25mg   INR 2.9 2.8 2.6 3.5 2.9 3.5 2.5 2.6 2.7 3.0 3.6   Notes                            Date 2/17  3/16 4/17 5/1 5/30 6/23  7/25 8/8 08/22 9/21 10/06   Total Weekly Dose 25mg  25 mg 25 mg  25 mg  25 mg 25mg  25 mg 22.5mg 22.5 mg 22.5 mg 22.5 mg    INR 2.7 3.3 3.5 2.4 3.1 2.9  4.0 2.5 2.9 1.9 2.6   Notes                   Inc glv        Date 10/23 10/30 11/15  11/30  12/15  1/11/24   1/17/24  1/24  2/5  2/21  3/15  3/22 4/9 4/18 4/24   Total Weekly Dose 22.5 mg  21.25mg 22.5 mg  22.5 mg  22.5mg  22.5 mg   20 mg  22.5 mg  22.5mg 22.5 mg  10 mg  22.5 mg 22.5 mg 20 mg 22.5 mg   INR 3.4 2.6 2.4  3.2  2.4  3.9    3.4   2.2  2.6  2.4  1.6  2.9 3.3 2.8 2.4   Notes APAP  Rec'd 10/24 Spoke 10/31  Spoke 11/16  APAP  spoke 12/19  Augmentin; spoke 1/12/24  holdx1; augmentin      hold x5, no GLV  Talked to pt 4/10        Date                   Total Weekly Dose                   INR                  Notes                    Phone Interview:  Tablet Strength: 5mg; 2.5mg  Patient Contact Info: [phone] (Mobile)  Estimated OOP cost: will send if patient comes to [place]   Verbal Release Auth:   Lab Contact Info: [facility] PO[facility]          Patient Findings  Positives: Change in health, Change in medications   Negatives: Signs/symptoms of thrombosis, Signs/symptoms of bleeding, Laboratory test error suspected, Change in alcohol use, Change in activity, Upcoming invasive procedure, Emergency department visit, Upcoming dental procedure, Missed doses, Extra doses, Change in diet/appetite, Hospital admission, Bruising, Other complaints   Comments: Patient has shingles he's been prescribed Acyclovir 800 mg 5 times daily for seven days ( started 4/21/24 )      All other findings negative per patient       Plan:  1. INR is therapeutic 4/24/24 at 2.4 (goal 2.0-3.0). Instructed Mr. Houser to continue warfarin 2.5 mg oral daily except 5 mg WedSat until recheck.   2. Repeat INR in two weeks 5/8/24  3. Verbal information provided over the phone. Kurt Houser RBV dosing instructions, expresses understanding by teach back, and has no further questions at this time.    Aren Muse, Pharmacy Technician  4/24/2024  16:18 EDT    I, Madina Greco, [facility], have reviewed the note in full and agree with the assessment and plan.  04/25/24  10:51 EDT

## 2024-05-09 ENCOUNTER — ANTICOAGULATION VISIT (OUTPATIENT)
Dept: PHARMACY | Facility: HOSPITAL | Age: 84
End: 2024-05-09
Payer: MEDICARE

## 2024-05-09 ENCOUNTER — CLINICAL SUPPORT (OUTPATIENT)
Dept: CARDIOLOGY | Facility: CLINIC | Age: 84
End: 2024-05-09
Payer: MEDICARE

## 2024-05-09 DIAGNOSIS — I48.20 ATRIAL FIBRILLATION, CHRONIC: Primary | ICD-10-CM

## 2024-05-09 LAB — INR PPP: 2.7 (ref 0.9–1.1)

## 2024-05-15 RX ORDER — WARFARIN SODIUM 2.5 MG/1
TABLET ORAL
Qty: 45 TABLET | Refills: 1 | Status: SHIPPED | OUTPATIENT
Start: 2024-05-15

## 2024-05-28 DIAGNOSIS — I10 HYPERTENSION, ESSENTIAL: ICD-10-CM

## 2024-05-28 RX ORDER — AMLODIPINE BESYLATE 5 MG/1
5 TABLET ORAL DAILY
Qty: 90 TABLET | Refills: 0 | Status: SHIPPED | OUTPATIENT
Start: 2024-05-28

## 2024-05-28 RX ORDER — LISINOPRIL 40 MG/1
40 TABLET ORAL DAILY
Qty: 90 TABLET | Refills: 0 | Status: SHIPPED | OUTPATIENT
Start: 2024-05-28

## 2024-05-31 ENCOUNTER — ANTICOAGULATION VISIT (OUTPATIENT)
Dept: PHARMACY | Facility: HOSPITAL | Age: 84
End: 2024-05-31
Payer: MEDICARE

## 2024-05-31 ENCOUNTER — OFFICE VISIT (OUTPATIENT)
Dept: CARDIOLOGY | Facility: CLINIC | Age: 84
End: 2024-05-31
Payer: MEDICARE

## 2024-05-31 VITALS
BODY MASS INDEX: 27.77 KG/M2 | DIASTOLIC BLOOD PRESSURE: 66 MMHG | HEIGHT: 74 IN | OXYGEN SATURATION: 94 % | HEART RATE: 63 BPM | SYSTOLIC BLOOD PRESSURE: 132 MMHG | WEIGHT: 216.4 LBS

## 2024-05-31 DIAGNOSIS — I48.20 ATRIAL FIBRILLATION, CHRONIC: Primary | ICD-10-CM

## 2024-05-31 DIAGNOSIS — E78.2 HYPERLIPIDEMIA, MIXED: ICD-10-CM

## 2024-05-31 DIAGNOSIS — I10 HYPERTENSION, ESSENTIAL: ICD-10-CM

## 2024-05-31 LAB — INR PPP: 3.1 (ref 0.9–1.1)

## 2024-05-31 NOTE — PROGRESS NOTES
Anticoagulation Clinic - Remote Progress Note  Remote Lab     Indication: paroxysmal afib  Referring Provider: Buddy Javier  Goal INR: 2.0-3.0  Current Drug Interactions: omega three fatty acid; CoQ-10   [id number]: 3 (HTN, Age)     Diet: ~ 2 Tablespoon QOD mixed cooked greens; green beans 2x weekly 1/12/2024  Alcohol: None  Tobacco: None  OTC Pain Medication: APAP PRN     INR History:  Date 3/23 3/30 4/8 4/16 4/23 5/7 6/4 6/18 7/2 7/19 7/28 8/11   Total Weekly Dose 27.5mg 25mg 25mg 25mg 25mg 25mg 25mg 25mg 25mg 25mg 25mg 25mg   INR 3.2 2.7 3.2 2.0 2.4 2.7 1.8 1.8 2.3 1.9 1.9 2.6   Notes     LD doxy 4/8       incr GLV   decr GLV incr GLV  incr GLV decr GLV      Date 8/25 9/22 10/20 11/18 11/29 12/28   3/2/22 3/1/ 4/5/22 4/11 4/27   Total WeeklyDose 25mg 25mg 25mg 25mg 25mg 25mg non- compliant 25mg 25 mg 25 mg 25mg 25 mg   INR 2.1 2.8 2.8 3.1 2.4 2.0   2.9 3.2 3.5 2.5 2.8   Notes       decr GLV       APAP   decr GLV          Date 5/31 6/28 7/26 8/25 9/8 10/6 10/20 11/10 11/30 1/4 2/2   Total Weekly Dose 25 mg 25 mg 25 mg 25 mg 25 mg 25 mg 25mg 25mg 25 mg 25 mg 25mg   INR 2.9 2.8 2.6 3.5 2.9 3.5 2.5 2.6 2.7 3.0 3.6   Notes                            Date 2/17  3/16 4/17 5/1 5/30 6/23  7/25 8/8 08/22 9/21 10/06   Total Weekly Dose 25mg  25 mg 25 mg  25 mg  25 mg 25mg  25 mg 22.5mg 22.5 mg 22.5 mg 22.5 mg    INR 2.7 3.3 3.5 2.4 3.1 2.9  4.0 2.5 2.9 1.9 2.6   Notes                   Inc glv        Date 10/23 10/30 11/15  11/30  12/15  1/11/24   1/17/24  1/24  2/5  2/21  3/15  3/22 4/9 4/18 4/24   Total Weekly Dose 22.5 mg  21.25mg 22.5 mg  22.5 mg  22.5mg  22.5 mg   20 mg  22.5 mg  22.5mg 22.5 mg  10 mg  22.5 mg 22.5 mg 20 mg 22.5 mg   INR 3.4 2.6 2.4  3.2  2.4  3.9    3.4   2.2  2.6  2.4  1.6  2.9 3.3 2.8 2.4   Notes APAP  Rec'd 10/24 Spoke 10/31  Spoke 11/16  APAP  spoke 12/19  Augmentin; spoke 1/12/24  holdx1; augmentin      hold x5, no GLV  Talked to pt 4/10        Date  5/9 5/31                Total Weekly Dose  22.5 mg 22.5 mg                INR 2.7 3.1                Notes                    Phone Interview:  Tablet Strength: 5mg; 2.5mg  Patient Contact Info: [phone] (Mobile)  Estimated OOP cost: will send if patient comes to [place]   Verbal Release Auth:   Lab Contact Info: [facility] POCT      Patient Findings  Negatives: Signs/symptoms of thrombosis, Signs/symptoms of bleeding, Laboratory test error suspected, Change in health, Change in alcohol use, Change in activity, Upcoming invasive procedure, Emergency department visit, Upcoming dental procedure, Missed doses, Extra doses, Change in medications, Change in diet/appetite, Hospital admission, Bruising, Other complaints   Comments: No changes per patient. Dosing verified.       Plan:  1. INR is slightly supratherapeutic today at 3.1 (goal 2.0-3.0). Instructed Mr. Houser to continue warfarin 2.5 mg oral daily except 5 mg WedSat until recheck given previous stability.   2. Repeat INR in 3 weeks, 6/21.  3. Verbal information provided over the phone. Kurt Houser RBV dosing instructions, expresses understanding by teach back, and has no further questions at this time.    Harman Salas, PharmD, BCPS  5/31/2024  17:19 EDT

## 2024-05-31 NOTE — PROGRESS NOTES
[facility] [place]  [facility] CARDIOLOGY  [address] [place] [address]  Dept: [phone]  Dept Fax: [phone]    Kurt Houser  1940    Follow Up Office Visit Note    History of Present Illness:  Kurt Houser is a 84 y.o. male who presents to the clinic for Follow-up- atrial fibrillation-He is in sinus rhythm today, denies any major complaints, telling me that he had peripheral neuropathy and also has bilateral carpal tunnel surgery, He denies lower back pain or gI complaints, his BP is 140.80, EKG sinus HR 70. Will  get an echo to look for amyloidosis,    The following portions of the patient's history were reviewed and updated as appropriate: allergies, current medications, past family history, past medical history, past social history, past surgical history, and problem list.    Medications:  amLODIPine  amoxicillin-clavulanate  B-12 capsule  CoQ-10 capsule  fish oil capsule  gabapentin  levothyroxine  lisinopril  Magnesium tablet  rosuvastatin  vitamin D3 capsule  warfarin    Subjective  Allergies   Allergen Reactions   • Acetaminophen Mental Status Change   • Hydrocodone Bitart (Antituss) [Hydrocodone] Mental Status Change   • Loratadine Hives        Past Medical History:   Diagnosis Date   • Acute osteomyelitis of foot    • Amputated toe of left foot    • Anemia of decreased vitamin B12 absorption    • BMI 27.0-27.9,adult    • Degenerative joint disease involving multiple joints    • Diverticular disease of colon    • Drug therapy    • Dyslipidemia    • Essential hypertension    • Foot osteomyelitis    • Frequent PVCs    • Glaucoma    • High risk medication use    • History of adenomatous polyp of colon    • Long term current use of anticoagulant    • Low density lipoprotein (LDL) cholesterol level below 100mg/dL    • Mixed hyperlipidemia    • Neuropathy    • Osteoarthritis    • Paroxysmal atrial fibrillation    • Polyneuropathy    • Prediabetes    • Ventricular  "premature beats    • Vitamin B12 deficiency    • Vitamin D deficiency        Past Surgical History:   Procedure Laterality Date   • CATARACT EXTRACTION, BILATERAL     • FOOT SURGERY Left    • TRANS METATARSAL AMPUTATION  2020    5th metatarsal amputation/IV abx   • WRIST SURGERY Left        Family History   Problem Relation Age of Onset   • Colon cancer Father    • Liver cancer Brother         Social History     Socioeconomic History   • Marital status:    Tobacco Use   • Smoking status: Former     Current packs/day: 0.00     Average packs/day: 1 pack/day for 40.0 years (40.0 ttl pk-yrs)     Types: Cigarettes, Cigars     Start date:      Quit date:      Years since quittin.4     Passive exposure: Current   • Smokeless tobacco: Never   • Tobacco comments:     1 cigar/day, 2.40 years   Vaping Use   • Vaping status: Never Used   Substance and Sexual Activity   • Alcohol use: Not Currently     Comment: about every 1-2 years he mighst have a drink   • Drug use: Never   • Sexual activity: Defer       Review of Systems   Constitutional: Negative.    HENT: Negative.     Respiratory: Negative.     Cardiovascular: Negative.    Endocrine: Negative.    Genitourinary: Negative.    Musculoskeletal: Negative.    Skin: Negative.    Allergic/Immunologic: Negative.    Neurological: Negative.    Hematological: Negative.    Psychiatric/Behavioral: Negative.       Cardiovascular Procedures    ECHO/MUGA:  STRESS TESTS:   CARDIAC CATH:   DEVICES:   HOLTER:   CT/MRI:   VASCULAR:   CARDIOTHORACIC:     Objective  Vitals:    24 1505   BP: 132/66   BP Location: Right arm   Patient Position: Lying   Cuff Size: Large Adult   Pulse: 63   SpO2: 94%   Weight: 98.2 kg (216 lb 6.4 oz)   Height: 188 cm (74\")   PainSc:   8   PainLoc: Knee     Body mass index is 27.78 kg/m².     Physical Exam  Vitals reviewed.   Constitutional:       Appearance: Healthy appearance. Not in distress.   Eyes:      Pupils: Pupils are " equal, round, and reactive to light.   HENT:    Mouth/Throat:      Pharynx: Oropharynx is clear.   Neck:      Thyroid: Thyroid normal.      Vascular: No JVR. JVD normal.   Pulmonary:      Effort: Pulmonary effort is normal.      Breath sounds: Normal breath sounds. No wheezing. No rhonchi. No rales.   Chest:      Chest wall: Not tender to palpatation.   Cardiovascular:      PMI at left midclavicular line. Normal rate. Regular rhythm. Normal S1. Normal S2.       Murmurs: There is no murmur.      No gallop.  No click. No rub.   Pulses:     Intact distal pulses.      Carotid: 3+ bilaterally.     Radial: 3+ bilaterally.     Femoral: 3+ bilaterally.     Dorsalis pedis: 3+ bilaterally.     Posterior tibial: 3+ bilaterally.  Edema:     Peripheral edema absent.   Abdominal:      General: Bowel sounds are normal.      Palpations: Abdomen is soft.      Tenderness: There is no abdominal tenderness.   Musculoskeletal: Normal range of motion.         General: No tenderness.      Cervical back: Normal range of motion and neck supple. Skin:     General: Skin is warm and dry.   Neurological:      General: No focal deficit present.      Mental Status: Alert and oriented to person, place and time.        Diagnostic Data    ECG 12 Lead    Date/Time: 5/31/2024 4:33 PM  Performed by: Buddy Javier MD    Authorized by: Buddy Javier MD  Comparison: compared with previous ECG from 3/4/2024  Similar to previous ECG  Rhythm: sinus rhythm  Rate: normal  BPM: 70  Q waves: III, aVF and II    Other findings: non-specific ST-T wave changes    Clinical impression: abnormal EKG        Assessment and Plan  Diagnoses and all orders for this visit:    Atrial fibrillation,  in sinus , asymptomatic on Warfarin  -     POC Protime / INR  -     Adult Transthoracic Echo Complete W/ Cont if Necessary Per Protocol; Future    Hypertension, essential- BP is borderline 140.80 on Lisinopril 40 mg and also Amlodipine 5mg,  -     Adult  Transthoracic Echo Complete W/ Cont if Necessary Per Protocol; Future    Hyperlipidemia, mixed- On crestor 20 mg, tolerates well, Ldl 60         Return in about 6 months (around 11/30/2024) for Recheck with Dr. Javier.    Buddy Javier MD  05/31/2024  Capillary Blood Specimen Collection  Capillary blood collection performed in clinic by Arti Chin MA. Patient tolerated the procedure well without complications.   05/31/24   Arti Chin MA

## 2024-06-06 DIAGNOSIS — G62.9 NEUROPATHY: ICD-10-CM

## 2024-06-06 RX ORDER — GABAPENTIN 600 MG/1
1200 TABLET ORAL 3 TIMES DAILY
Qty: 180 TABLET | Refills: 0 | Status: SHIPPED | OUTPATIENT
Start: 2024-06-06

## 2024-06-06 NOTE — TELEPHONE ENCOUNTER
Rx Refill Note    Requested Prescriptions     Pending Prescriptions Disp Refills    gabapentin (NEURONTIN) 600 MG tablet [Pharmacy Med Name: Gabapentin 600 MG Oral Tablet] 180 tablet 0     Sig: TAKE 2 TABLETS BY MOUTH THREE TIMES DAILY        Last office visit with prescribing clinician: 1/5/2024      Next office visit with prescribing clinician: 6/26/2024   Last labs:   Last refill: 04/11/2024   Pharmacy (be sure to add in Epic). correct

## 2024-06-19 ENCOUNTER — LAB (OUTPATIENT)
Dept: FAMILY MEDICINE CLINIC | Facility: CLINIC | Age: 84
End: 2024-06-19
Payer: MEDICARE

## 2024-06-19 DIAGNOSIS — E55.9 VITAMIN D DEFICIENCY: ICD-10-CM

## 2024-06-19 DIAGNOSIS — N18.31 CHRONIC KIDNEY DISEASE, STAGE 3A: ICD-10-CM

## 2024-06-19 DIAGNOSIS — R73.9 HYPERGLYCEMIA: ICD-10-CM

## 2024-06-19 DIAGNOSIS — R97.20 ELEVATED PSA: Primary | ICD-10-CM

## 2024-06-19 DIAGNOSIS — E53.8 VITAMIN B12 DEFICIENCY: ICD-10-CM

## 2024-06-19 DIAGNOSIS — E78.2 HYPERLIPIDEMIA, MIXED: ICD-10-CM

## 2024-06-19 DIAGNOSIS — E03.9 ACQUIRED HYPOTHYROIDISM: ICD-10-CM

## 2024-06-19 DIAGNOSIS — G62.9 NEUROPATHY: ICD-10-CM

## 2024-06-19 DIAGNOSIS — I10 HYPERTENSION, ESSENTIAL: ICD-10-CM

## 2024-06-19 PROCEDURE — 36415 COLL VENOUS BLD VENIPUNCTURE: CPT | Performed by: FAMILY MEDICINE

## 2024-06-20 LAB
25(OH)D3+25(OH)D2 SERPL-MCNC: 68.2 NG/ML (ref 30–100)
ALBUMIN SERPL-MCNC: 4 G/DL (ref 3.7–4.7)
ALP SERPL-CCNC: 55 IU/L (ref 44–121)
ALT SERPL-CCNC: 9 IU/L (ref 0–44)
AST SERPL-CCNC: 15 IU/L (ref 0–40)
BASOPHILS # BLD AUTO: 0 X10E3/UL (ref 0–0.2)
BASOPHILS NFR BLD AUTO: 0 %
BILIRUB SERPL-MCNC: 0.5 MG/DL (ref 0–1.2)
BUN SERPL-MCNC: 17 MG/DL (ref 8–27)
BUN/CREAT SERPL: 14 (ref 10–24)
CALCIUM SERPL-MCNC: 9.4 MG/DL (ref 8.6–10.2)
CHLORIDE SERPL-SCNC: 103 MMOL/L (ref 96–106)
CHOLEST SERPL-MCNC: 109 MG/DL (ref 100–199)
CO2 SERPL-SCNC: 24 MMOL/L (ref 20–29)
CREAT SERPL-MCNC: 1.22 MG/DL (ref 0.76–1.27)
EGFRCR SERPLBLD CKD-EPI 2021: 58 ML/MIN/1.73
EOSINOPHIL # BLD AUTO: 0.1 X10E3/UL (ref 0–0.4)
EOSINOPHIL NFR BLD AUTO: 2 %
ERYTHROCYTE [DISTWIDTH] IN BLOOD BY AUTOMATED COUNT: 13.2 % (ref 11.6–15.4)
GLOBULIN SER CALC-MCNC: 3.4 G/DL (ref 1.5–4.5)
GLUCOSE SERPL-MCNC: 98 MG/DL (ref 70–99)
HBA1C MFR BLD: 6.1 % (ref 4.8–5.6)
HCT VFR BLD AUTO: 33.7 % (ref 37.5–51)
HDLC SERPL-MCNC: 32 MG/DL
HGB BLD-MCNC: 10.8 G/DL (ref 13–17.7)
IMM GRANULOCYTES # BLD AUTO: 0 X10E3/UL (ref 0–0.1)
IMM GRANULOCYTES NFR BLD AUTO: 0 %
LDLC SERPL CALC-MCNC: 51 MG/DL (ref 0–99)
LYMPHOCYTES # BLD AUTO: 1.5 X10E3/UL (ref 0.7–3.1)
LYMPHOCYTES NFR BLD AUTO: 28 %
MCH RBC QN AUTO: 30.4 PG (ref 26.6–33)
MCHC RBC AUTO-ENTMCNC: 32 G/DL (ref 31.5–35.7)
MCV RBC AUTO: 95 FL (ref 79–97)
MONOCYTES # BLD AUTO: 0.6 X10E3/UL (ref 0.1–0.9)
MONOCYTES NFR BLD AUTO: 11 %
NEUTROPHILS # BLD AUTO: 3 X10E3/UL (ref 1.4–7)
NEUTROPHILS NFR BLD AUTO: 59 %
PLATELET # BLD AUTO: 186 X10E3/UL (ref 150–450)
POTASSIUM SERPL-SCNC: 4.8 MMOL/L (ref 3.5–5.2)
PROT SERPL-MCNC: 7.4 G/DL (ref 6–8.5)
PSA SERPL-MCNC: 14 NG/ML (ref 0–4)
RBC # BLD AUTO: 3.55 X10E6/UL (ref 4.14–5.8)
SODIUM SERPL-SCNC: 141 MMOL/L (ref 134–144)
TRIGL SERPL-MCNC: 152 MG/DL (ref 0–149)
TSH SERPL DL<=0.005 MIU/L-ACNC: 3.42 UIU/ML (ref 0.45–4.5)
VIT B12 SERPL-MCNC: 330 PG/ML (ref 232–1245)
VLDLC SERPL CALC-MCNC: 26 MG/DL (ref 5–40)
WBC # BLD AUTO: 5.2 X10E3/UL (ref 3.4–10.8)

## 2024-06-24 ENCOUNTER — ANTICOAGULATION VISIT (OUTPATIENT)
Dept: PHARMACY | Facility: HOSPITAL | Age: 84
End: 2024-06-24
Payer: MEDICARE

## 2024-06-24 ENCOUNTER — CLINICAL SUPPORT (OUTPATIENT)
Dept: CARDIOLOGY | Facility: CLINIC | Age: 84
End: 2024-06-24
Payer: MEDICARE

## 2024-06-24 DIAGNOSIS — I48.20 ATRIAL FIBRILLATION, CHRONIC: Primary | ICD-10-CM

## 2024-06-24 LAB — INR PPP: 2.9 (ref 0.9–1.1)

## 2024-06-24 PROCEDURE — 36416 COLLJ CAPILLARY BLOOD SPEC: CPT | Performed by: INTERNAL MEDICINE

## 2024-06-24 NOTE — PROGRESS NOTES
Capillary Blood Specimen Collection  Capillary blood collection performed in clinic by Jossie Naqvi RN. Patient tolerated the procedure well without complications.   06/24/24   Jossie Naqvi RN

## 2024-06-24 NOTE — PROGRESS NOTES
Anticoagulation Clinic - Remote Progress Note  Remote Lab     Indication: paroxysmal afib  Referring Provider: Buddy Javier  Goal INR: 2.0-3.0  Current Drug Interactions: omega three fatty acid; CoQ-10   [id number]: 3 (HTN, Age)     Diet: ~ 2 Tablespoon QOD mixed cooked greens; green beans 2x weekly 1/12/2024  Alcohol: None  Tobacco: None  OTC Pain Medication: APAP PRN     INR History:  Date 3/23 3/30 4/8 4/16 4/23 5/7 6/4 6/18 7/2 7/19 7/28 8/11   Total Weekly Dose 27.5mg 25mg 25mg 25mg 25mg 25mg 25mg 25mg 25mg 25mg 25mg 25mg   INR 3.2 2.7 3.2 2.0 2.4 2.7 1.8 1.8 2.3 1.9 1.9 2.6   Notes     LD doxy 4/8       incr GLV   decr GLV incr GLV  incr GLV decr GLV      Date 8/25 9/22 10/20 11/18 11/29 12/28   3/2/22 3/1/ 4/5/22 4/11 4/27   Total WeeklyDose 25mg 25mg 25mg 25mg 25mg 25mg non- compliant 25mg 25 mg 25 mg 25mg 25 mg   INR 2.1 2.8 2.8 3.1 2.4 2.0   2.9 3.2 3.5 2.5 2.8   Notes       decr GLV       APAP   decr GLV          Date 5/31 6/28 7/26 8/25 9/8 10/6 10/20 11/10 11/30 1/4 2/2   Total Weekly Dose 25 mg 25 mg 25 mg 25 mg 25 mg 25 mg 25mg 25mg 25 mg 25 mg 25mg   INR 2.9 2.8 2.6 3.5 2.9 3.5 2.5 2.6 2.7 3.0 3.6   Notes                            Date 2/17  3/16 4/17 5/1 5/30 6/23  7/25 8/8 08/22 9/21 10/06   Total Weekly Dose 25mg  25 mg 25 mg  25 mg  25 mg 25mg  25 mg 22.5mg 22.5 mg 22.5 mg 22.5 mg    INR 2.7 3.3 3.5 2.4 3.1 2.9  4.0 2.5 2.9 1.9 2.6   Notes                   Inc glv        Date 10/23 10/30 11/15  11/30  12/15  1/11/24   1/17/24  1/24  2/5  2/21  3/15  3/22 4/9 4/18 4/24   Total Weekly Dose 22.5 mg  21.25mg 22.5 mg  22.5 mg  22.5mg  22.5 mg   20 mg  22.5 mg  22.5mg 22.5 mg  10 mg  22.5 mg 22.5 mg 20 mg 22.5 mg   INR 3.4 2.6 2.4  3.2  2.4  3.9    3.4   2.2  2.6  2.4  1.6  2.9 3.3 2.8 2.4   Notes APAP  Rec'd 10/24 Spoke 10/31  Spoke 11/16  APAP  spoke 12/19  Augmentin; spoke 1/12/24  holdx1; augmentin      hold x5, no GLV  Talked to pt 4/10        Date  5/9 5/31 6/24               Total Weekly Dose  22.5 mg 22.5 mg 22.5 mg               INR 2.7 3.1 2.9               Notes                    Phone Interview:  Tablet Strength: 5mg; 2.5mg  Patient Contact Info: [phone] (Mobile)  Estimated OOP cost: will send if patient comes to [place]   Verbal Release Auth:   Lab Contact Info: [facility] POCT    Patient Findings    Negatives: Signs/symptoms of thrombosis, Signs/symptoms of bleeding, Laboratory test error suspected, Change in health, Change in alcohol use, Change in activity, Upcoming invasive procedure, Emergency department visit, Upcoming dental procedure, Missed doses, Extra doses, Change in medications, Change in diet/appetite, Hospital admission, Bruising, Other complaints   Comments: All findings negative per patient.     Plan:  1. INR was therapeutic yesterday at 2.9 (goal 2.0-3.0). Patient contacted today. Instructed Mr. Houser to continue warfarin 2.5 mg oral daily except 5 mg WedSat until recheck.   2. Repeat INR in 3 weeks, 7/15/24  3. Verbal information provided over the phone. Kurt Houser RBV dosing instructions, expresses understanding by teach back, and has no further questions at this time.      Ana Mason CPhT   10:32 EDT 6/25/2024    I, Madina Greco [facility], have reviewed the note in full and agree with the assessment and plan.  06/25/24  13:08 EDT

## 2024-06-26 ENCOUNTER — OFFICE VISIT (OUTPATIENT)
Dept: FAMILY MEDICINE CLINIC | Facility: CLINIC | Age: 84
End: 2024-06-26
Payer: MEDICARE

## 2024-06-26 VITALS
WEIGHT: 217.4 LBS | OXYGEN SATURATION: 99 % | BODY MASS INDEX: 27.9 KG/M2 | DIASTOLIC BLOOD PRESSURE: 74 MMHG | RESPIRATION RATE: 16 BRPM | SYSTOLIC BLOOD PRESSURE: 128 MMHG | HEIGHT: 74 IN | HEART RATE: 68 BPM

## 2024-06-26 DIAGNOSIS — E53.8 VITAMIN B12 DEFICIENCY: ICD-10-CM

## 2024-06-26 DIAGNOSIS — I10 HYPERTENSION, ESSENTIAL: ICD-10-CM

## 2024-06-26 DIAGNOSIS — D64.9 ANEMIA, UNSPECIFIED TYPE: Primary | ICD-10-CM

## 2024-06-26 DIAGNOSIS — R73.9 HYPERGLYCEMIA: ICD-10-CM

## 2024-06-26 DIAGNOSIS — N18.31 CHRONIC KIDNEY DISEASE, STAGE 3A: ICD-10-CM

## 2024-06-26 DIAGNOSIS — E03.9 ACQUIRED HYPOTHYROIDISM: ICD-10-CM

## 2024-06-26 DIAGNOSIS — R97.20 INCREASED PROSTATE SPECIFIC ANTIGEN (PSA) VELOCITY: ICD-10-CM

## 2024-06-26 DIAGNOSIS — E78.2 HYPERLIPIDEMIA, MIXED: ICD-10-CM

## 2024-06-26 DIAGNOSIS — E55.9 VITAMIN D DEFICIENCY: ICD-10-CM

## 2024-06-26 PROCEDURE — 3078F DIAST BP <80 MM HG: CPT | Performed by: FAMILY MEDICINE

## 2024-06-26 PROCEDURE — 99214 OFFICE O/P EST MOD 30 MIN: CPT | Performed by: FAMILY MEDICINE

## 2024-06-26 PROCEDURE — 1126F AMNT PAIN NOTED NONE PRSNT: CPT | Performed by: FAMILY MEDICINE

## 2024-06-26 PROCEDURE — G2211 COMPLEX E/M VISIT ADD ON: HCPCS | Performed by: FAMILY MEDICINE

## 2024-06-26 PROCEDURE — 3074F SYST BP LT 130 MM HG: CPT | Performed by: FAMILY MEDICINE

## 2024-06-26 PROCEDURE — 36415 COLL VENOUS BLD VENIPUNCTURE: CPT | Performed by: FAMILY MEDICINE

## 2024-06-26 NOTE — ASSESSMENT & PLAN NOTE
This has been relatively stable.  Hemoglobin and hematocrit did get down to 10.8 33.7.  Going to check anemia studies.

## 2024-06-26 NOTE — PROGRESS NOTES
Patient Name: Kurt Houser  : 1940   MRN: [id number]     Chief Complaint:    Chief Complaint   Patient presents with    Follow-up       History of Present Illness: Kurt Houser is a 84 y.o. male who is here today for follow up.  HPI        Review of Systems:   Review of Systems   Constitutional: Negative.    HENT: Negative.     Eyes: Negative.    Respiratory: Negative.     Cardiovascular: Negative.    Gastrointestinal: Negative.    Neurological: Negative.         Past Medical History:   Past Medical History:   Diagnosis Date    Acute osteomyelitis of foot     Amputated toe of left foot     Anemia of decreased vitamin B12 absorption     BMI 27.0-27.9,adult     Degenerative joint disease involving multiple joints     Diverticular disease of colon     Drug therapy     Dyslipidemia     Essential hypertension     Foot osteomyelitis     Frequent PVCs     Glaucoma     High risk medication use     History of adenomatous polyp of colon     Long term current use of anticoagulant     Low density lipoprotein (LDL) cholesterol level below 100mg/dL     Mixed hyperlipidemia     Neuropathy     Osteoarthritis     Paroxysmal atrial fibrillation     Polyneuropathy     Prediabetes     Ventricular premature beats     Vitamin B12 deficiency     Vitamin D deficiency        Past Surgical History:   Past Surgical History:   Procedure Laterality Date    CATARACT EXTRACTION, BILATERAL      FOOT SURGERY Left     TRANS METATARSAL AMPUTATION  2020    5th metatarsal amputation/IV abx    WRIST SURGERY Left        Family History:   Family History   Problem Relation Age of Onset    Colon cancer Father     Liver cancer Brother        Social History:   Social History     Socioeconomic History    Marital status:    Tobacco Use    Smoking status: Former     Current packs/day: 0.00     Average packs/day: 1 pack/day for 40.0 years (40.0 ttl pk-yrs)     Types: Cigarettes, Cigars     Start date:      Quit date:       Years since quittin.5     Passive exposure: Current    Smokeless tobacco: Never    Tobacco comments:     1 cigar/day, 2.40 years   Vaping Use    Vaping status: Never Used   Substance and Sexual Activity    Alcohol use: Not Currently     Comment: about every 1-2 years he mighst have a drink    Drug use: Never    Sexual activity: Defer       Medications:     Current Outpatient Medications:     amLODIPine (NORVASC) 5 MG tablet, Take 1 tablet by mouth once daily, Disp: 90 tablet, Rfl: 0    Coenzyme Q10 (CoQ-10) 100 MG capsule, Take  by mouth Daily., Disp: , Rfl:     Cyanocobalamin (B-12) 1000 MCG capsule, Take 1,000 mg by mouth Daily., Disp: 90 capsule, Rfl: 1    gabapentin (NEURONTIN) 600 MG tablet, TAKE 2 TABLETS BY MOUTH THREE TIMES DAILY, Disp: 180 tablet, Rfl: 0    levothyroxine (SYNTHROID, LEVOTHROID) 25 MCG tablet, Take 1 tablet by mouth once daily, Disp: 90 tablet, Rfl: 0    lisinopril (PRINIVIL,ZESTRIL) 40 MG tablet, Take 1 tablet by mouth once daily, Disp: 90 tablet, Rfl: 0    Magnesium 250 MG tablet, Take 1 tablet by mouth Daily., Disp: , Rfl:     Omega-3 Fatty Acids (fish oil) 1000 MG capsule capsule, Take  by mouth Daily With Breakfast., Disp: , Rfl:     rosuvastatin (CRESTOR) 20 MG tablet, Take 1 tablet by mouth once daily, Disp: 90 tablet, Rfl: 1    vitamin D3 125 MCG (5000 UT) capsule capsule, Take 1 capsule by mouth Daily., Disp: 90 capsule, Rfl: 1    warfarin (COUMADIN) 2.5 MG tablet, TAKE 1 TO 2 TABLETS BY MOUTH ONCE DAILY OR AS DIRECTED BY  ANTICOAGULATION  CLINIC., Disp: 45 tablet, Rfl: 1    warfarin (COUMADIN) 5 MG tablet, Take 1 tablet by mouth once daily, Disp: 90 tablet, Rfl: 1    Allergies:   Allergies   Allergen Reactions    Acetaminophen Mental Status Change    Hydrocodone Bitart (Antituss) [Hydrocodone] Mental Status Change    Loratadine Hives         Physical Exam:  Vital Signs:   Vitals:    24 1351   BP: 128/74   BP Location: Right arm   Patient Position: Sitting   Cuff Size:  "Adult   Pulse: 68   Resp: 16   SpO2: 99%   Weight: 98.6 kg (217 lb 6.4 oz)   Height: 188 cm (74.02\")   PainSc: 0-No pain     Body mass index is 27.9 kg/m².     Physical Exam  Vitals and nursing note reviewed.   Constitutional:       Appearance: Normal appearance. He is normal weight.   HENT:      Head: Normocephalic and atraumatic.      Right Ear: Tympanic membrane, ear canal and external ear normal.      Left Ear: Tympanic membrane, ear canal and external ear normal.      Nose: Nose normal.      Mouth/Throat:      Mouth: Mucous membranes are dry.      Pharynx: Oropharynx is clear.   Eyes:      Extraocular Movements: Extraocular movements intact.      Conjunctiva/sclera: Conjunctivae normal.      Pupils: Pupils are equal, round, and reactive to light.   Cardiovascular:      Rate and Rhythm: Normal rate and regular rhythm.      Pulses: Normal pulses.      Heart sounds: Normal heart sounds.   Pulmonary:      Effort: Pulmonary effort is normal.      Breath sounds: Normal breath sounds.   Musculoskeletal:      Cervical back: Normal range of motion and neck supple.   Feet:      Comments:      Neurological:      Mental Status: He is alert.         Procedures      Assessment/Plan:   Diagnoses and all orders for this visit:    1. Anemia, unspecified type (Primary)  Assessment & Plan:  This has been relatively stable.  Hemoglobin and hematocrit did get down to 10.8 33.7.  Going to check anemia studies.    Orders:  -     Folate; Future  -     Ferritin; Future  -     Iron Profile; Future    2. Hypertension, essential  Assessment & Plan:  Discussed with patient to monitor their blood pressure and if systolic blood pressure goes above 140 or diastolic is above 90 to return to clinic.  Take medicines as directed, call for any problems, patient not having or any worrisome symptoms.        Orders:  -     Folate; Future  -     Ferritin; Future  -     Iron Profile; Future    3. Hyperlipidemia, mixed  Assessment & Plan:  HDL 32.  LDL " 51.  Recheck in 6 months.    Orders:  -     Folate; Future  -     Ferritin; Future  -     Iron Profile; Future    4. Acquired hypothyroidism  Assessment & Plan:  TSH is 3.420    Orders:  -     Folate; Future  -     Ferritin; Future  -     Iron Profile; Future    5. Hyperglycemia  Assessment & Plan:  A1c 6.1.    Orders:  -     Folate; Future  -     Ferritin; Future  -     Iron Profile; Future    6. Vitamin B12 deficiency  Assessment & Plan:  Vitamin B12 330.    Orders:  -     Folate; Future  -     Ferritin; Future  -     Iron Profile; Future    7. Vitamin D deficiency  Assessment & Plan:  Vitamin D is 68.2.    Orders:  -     Folate; Future  -     Ferritin; Future  -     Iron Profile; Future    8. Chronic kidney disease, stage 3a  Assessment & Plan:  EFR is 58.    Orders:  -     Folate; Future  -     Ferritin; Future  -     Iron Profile; Future    9. Increased prostate specific antigen (PSA) velocity  -     Ambulatory Referral to Urology    Other orders  -     vitamin D3 125 MCG (5000 UT) capsule capsule; Take 1 capsule by mouth Daily.  Dispense: 90 capsule; Refill: 1             Follow Up:   Return in about 3 months (around 9/26/2024) for Annual physical, Bloodwork 1 week prior to next appointment.      Leo Cordova MD  [facility]

## 2024-06-27 LAB
FERRITIN SERPL-MCNC: 325 NG/ML (ref 30–400)
FOLATE SERPL-MCNC: 4.8 NG/ML
IRON SERPL-MCNC: NORMAL UG/DL
SPECIMEN STATUS: NORMAL
UIBC SERPL-MCNC: NORMAL UG/DL

## 2024-06-28 LAB
IRON SATN MFR SERPL: 27 % (ref 15–55)
IRON SERPL-MCNC: 59 UG/DL (ref 38–169)
TIBC SERPL-MCNC: 220 UG/DL (ref 250–450)
UIBC SERPL-MCNC: 161 UG/DL (ref 111–343)

## 2024-07-03 ENCOUNTER — TELEPHONE (OUTPATIENT)
Dept: CARDIOLOGY | Facility: CLINIC | Age: 84
End: 2024-07-03
Payer: MEDICARE

## 2024-07-03 DIAGNOSIS — E85.9 AMYLOIDOSIS, UNSPECIFIED TYPE: Primary | ICD-10-CM

## 2024-07-03 NOTE — TELEPHONE ENCOUNTER
----- Message from Buddy Javier sent at 6/28/2024  5:00 PM EDT -----  Echo  indicates mild thickness of the heart  as he has atrial fibrillation, we might consider to look for amyloidosis, we can order a PYP if he agrees for it

## 2024-07-09 DIAGNOSIS — E03.9 ACQUIRED HYPOTHYROIDISM: ICD-10-CM

## 2024-07-09 RX ORDER — LEVOTHYROXINE SODIUM 0.03 MG/1
25 TABLET ORAL DAILY
Qty: 60 TABLET | Refills: 0 | Status: SHIPPED | OUTPATIENT
Start: 2024-07-09

## 2024-07-11 ENCOUNTER — TELEPHONE (OUTPATIENT)
Dept: CARDIOLOGY | Facility: CLINIC | Age: 84
End: 2024-07-11
Payer: MEDICARE

## 2024-07-11 NOTE — TELEPHONE ENCOUNTER
Spoke with MR. Houser and explained that Dr. Javier is looking for a condition called, amyloidosis, and explained what this is.  Explained the PYP test. He is agreeable to have it done.

## 2024-07-12 DIAGNOSIS — G62.9 NEUROPATHY: ICD-10-CM

## 2024-07-15 RX ORDER — GABAPENTIN 600 MG/1
1200 TABLET ORAL 3 TIMES DAILY
Qty: 180 TABLET | Refills: 0 | Status: SHIPPED | OUTPATIENT
Start: 2024-07-15

## 2024-07-16 ENCOUNTER — CLINICAL SUPPORT (OUTPATIENT)
Dept: CARDIOLOGY | Facility: CLINIC | Age: 84
End: 2024-07-16
Payer: MEDICARE

## 2024-07-16 ENCOUNTER — ANTICOAGULATION VISIT (OUTPATIENT)
Dept: PHARMACY | Facility: HOSPITAL | Age: 84
End: 2024-07-16
Payer: MEDICARE

## 2024-07-16 DIAGNOSIS — Z79.01 LONG TERM (CURRENT) USE OF ANTICOAGULANTS: Primary | ICD-10-CM

## 2024-07-16 LAB — INR PPP: 3.5 (ref 0.9–1.1)

## 2024-07-16 NOTE — PROGRESS NOTES
Anticoagulation Clinic - Remote Progress Note  Remote Lab     Indication: paroxysmal afib  Referring Provider: Buddy Javier  Goal INR: 2.0-3.0  Current Drug Interactions: omega three fatty acid; CoQ-10   [id number]: 3 (HTN, Age)     Diet: ~ 2 Tablespoon QOD mixed cooked greens; green beans 2x weekly 1/12/2024  Alcohol: None  Tobacco: None  OTC Pain Medication: APAP PRN     INR History:  Date 3/23 3/30 4/8 4/16 4/23 5/7 6/4 6/18 7/2 7/19 7/28 8/11   Total Weekly Dose 27.5mg 25mg 25mg 25mg 25mg 25mg 25mg 25mg 25mg 25mg 25mg 25mg   INR 3.2 2.7 3.2 2.0 2.4 2.7 1.8 1.8 2.3 1.9 1.9 2.6   Notes     LD doxy 4/8       incr GLV   decr GLV incr GLV  incr GLV decr GLV      Date 8/25 9/22 10/20 11/18 11/29 12/28   3/2/22 3/1/ 4/5/22 4/11 4/27   Total WeeklyDose 25mg 25mg 25mg 25mg 25mg 25mg non- compliant 25mg 25 mg 25 mg 25mg 25 mg   INR 2.1 2.8 2.8 3.1 2.4 2.0   2.9 3.2 3.5 2.5 2.8   Notes       decr GLV       APAP   decr GLV          Date 5/31 6/28 7/26 8/25 9/8 10/6 10/20 11/10 11/30 1/4 2/2   Total Weekly Dose 25 mg 25 mg 25 mg 25 mg 25 mg 25 mg 25mg 25mg 25 mg 25 mg 25mg   INR 2.9 2.8 2.6 3.5 2.9 3.5 2.5 2.6 2.7 3.0 3.6   Notes                            Date 2/17  3/16 4/17 5/1 5/30 6/23  7/25 8/8 08/22 9/21 10/06   Total Weekly Dose 25mg  25 mg 25 mg  25 mg  25 mg 25mg  25 mg 22.5mg 22.5 mg 22.5 mg 22.5 mg    INR 2.7 3.3 3.5 2.4 3.1 2.9  4.0 2.5 2.9 1.9 2.6   Notes                   Inc glv        Date 10/23 10/30 11/15  11/30  12/15  1/11/24   1/17/24  1/24  2/5  2/21  3/15  3/22 4/9 4/18 4/24   Total Weekly Dose 22.5 mg  21.25mg 22.5 mg  22.5 mg  22.5mg  22.5 mg   20 mg  22.5 mg  22.5mg 22.5 mg  10 mg  22.5 mg 22.5 mg 20 mg 22.5 mg   INR 3.4 2.6 2.4  3.2  2.4  3.9    3.4   2.2  2.6  2.4  1.6  2.9 3.3 2.8 2.4   Notes APAP  Rec'd 10/24 Spoke 10/31  Spoke 11/16  APAP  spoke 12/19  Augmentin; spoke 1/12/24  holdx1; augmentin      hold x5, no GLV  Talked to pt 4/10        Date  5/9 5/31 6/24 7/16              Total Weekly  Dose 22.5 mg 22.5 mg 22.5 mg 22.5mg              INR 2.7 3.1 2.9 3.5              Notes    Inc GLV  Unable to contact until 7/17                Phone Interview:  Tablet Strength: 5mg; 2.5mg  Patient Contact Info: [phone] (Mobile)  Estimated OOP cost: will send if patient comes to [place]   Verbal Release Auth:   Lab Contact Info: [facility] POCT    Patient Findings    Positives: Change in diet/appetite   Negatives: Signs/symptoms of thrombosis, Signs/symptoms of bleeding, Laboratory test error suspected, Change in health, Change in alcohol use, Change in activity, Upcoming invasive procedure, Emergency department visit, Upcoming dental procedure, Missed doses, Extra doses, Change in medications, Hospital admission, Bruising, Other complaints   Comments: Mr. Houser Forgot to eat his mixed greens the week prior to his INR check. He denies all the other above listed findings.       Plan:  1. INR was Supratherapeutic yesterday at 3.5 (goal 2.0-3.0). Instructed Mr. Houser to decrease this evenings dose to warfarin 3.75 mg and then resume warfarin 2.5 mg oral daily except warfarin 5 mg on Wednesday and Saturday until recheck.   2. Repeat INR in 2 weeks, 7/30/24  3. Refill of warfarin 2.5 mg tablets sent to [facility] this encounter.   4. Verbal information provided over the phone. Kurt Houser RBV dosing instructions, expresses understanding by teach back, and has no further questions at this time.      Romana Singletary, PharmD  7/17/2024   10:20 EDT

## 2024-07-16 NOTE — PROGRESS NOTES
Capillary Blood Specimen Collection  Capillary blood collection performed in clinic by Nallely Schwartz MA. Patient tolerated the procedure well without complications.   07/16/24   Nallely Schwartz MA

## 2024-07-17 RX ORDER — WARFARIN SODIUM 2.5 MG/1
TABLET ORAL
Qty: 90 TABLET | Refills: 0 | Status: SHIPPED | OUTPATIENT
Start: 2024-07-17

## 2024-07-31 ENCOUNTER — TELEPHONE (OUTPATIENT)
Dept: CARDIOLOGY | Facility: CLINIC | Age: 84
End: 2024-07-31

## 2024-07-31 ENCOUNTER — CLINICAL SUPPORT (OUTPATIENT)
Dept: CARDIOLOGY | Facility: CLINIC | Age: 84
End: 2024-07-31
Payer: MEDICARE

## 2024-07-31 ENCOUNTER — ANTICOAGULATION VISIT (OUTPATIENT)
Dept: PHARMACY | Facility: HOSPITAL | Age: 84
End: 2024-07-31
Payer: MEDICARE

## 2024-07-31 DIAGNOSIS — Z79.01 LONG TERM (CURRENT) USE OF ANTICOAGULANTS: Primary | ICD-10-CM

## 2024-07-31 LAB — INR PPP: 1.8 (ref 0.9–1.1)

## 2024-07-31 NOTE — TELEPHONE ENCOUNTER
Mr. Houser here for his INR and I spoke again with him about doing the PYP test and we discussed the amyloidosis disease process.  I gave him the number at [facility] [place] to call and schedule his nuclear test if he wants to proceed. He states he is going to call and schedule.

## 2024-07-31 NOTE — PROGRESS NOTES
Capillary Blood Specimen Collection  Capillary blood collection performed in clinic by Jossie Naqvi RN. Patient tolerated the procedure well without complications.   07/31/24   Jossie Naqvi RN

## 2024-08-01 NOTE — PROGRESS NOTES
Anticoagulation Clinic - Remote Progress Note  Remote Lab     Indication: paroxysmal afib  Referring Provider: Buddy Javier  Goal INR: 2.0-3.0  Current Drug Interactions: omega three fatty acid; CoQ-10   [id number]: 3 (HTN, Age)     Diet: ~ 2 Tablespoon QOD mixed cooked greens; green beans 2x weekly 1/12/2024  Alcohol: None  Tobacco: None  OTC Pain Medication: APAP PRN     INR History:  Date 3/23 3/30 4/8 4/16 4/23 5/7 6/4 6/18 7/2 7/19 7/28 8/11   Total Weekly Dose 27.5mg 25mg 25mg 25mg 25mg 25mg 25mg 25mg 25mg 25mg 25mg 25mg   INR 3.2 2.7 3.2 2.0 2.4 2.7 1.8 1.8 2.3 1.9 1.9 2.6   Notes     LD doxy 4/8       incr GLV   decr GLV incr GLV  incr GLV decr GLV      Date 8/25 9/22 10/20 11/18 11/29 12/28   3/2/22 3/1/ 4/5/22 4/11 4/27   Total WeeklyDose 25mg 25mg 25mg 25mg 25mg 25mg non- compliant 25mg 25 mg 25 mg 25mg 25 mg   INR 2.1 2.8 2.8 3.1 2.4 2.0   2.9 3.2 3.5 2.5 2.8   Notes       decr GLV       APAP   decr GLV          Date 5/31 6/28 7/26 8/25 9/8 10/6 10/20 11/10 11/30 1/4 2/2   Total Weekly Dose 25 mg 25 mg 25 mg 25 mg 25 mg 25 mg 25mg 25mg 25 mg 25 mg 25mg   INR 2.9 2.8 2.6 3.5 2.9 3.5 2.5 2.6 2.7 3.0 3.6   Notes                            Date 2/17  3/16 4/17 5/1 5/30 6/23  7/25 8/8 08/22 9/21 10/06   Total Weekly Dose 25mg  25 mg 25 mg  25 mg  25 mg 25mg  25 mg 22.5mg 22.5 mg 22.5 mg 22.5 mg    INR 2.7 3.3 3.5 2.4 3.1 2.9  4.0 2.5 2.9 1.9 2.6   Notes                   Inc glv        Date 10/23 10/30 11/15  11/30  12/15  1/11/24   1/17/24  1/24  2/5  2/21  3/15  3/22 4/9 4/18 4/24   Total Weekly Dose 22.5 mg  21.25mg 22.5 mg  22.5 mg  22.5mg  22.5 mg   20 mg  22.5 mg  22.5mg 22.5 mg  10 mg  22.5 mg 22.5 mg 20 mg 22.5 mg   INR 3.4 2.6 2.4  3.2  2.4  3.9    3.4   2.2  2.6  2.4  1.6  2.9 3.3 2.8 2.4   Notes APAP  Rec'd 10/24 Spoke 10/31  Spoke 11/16  APAP  spoke 12/19  Augmentin; spoke 1/12/24  holdx1; augmentin      hold x5, no GLV  Talked to pt 4/10        Date  5/9 5/31 6/24 7/16 7/31             Total  Weekly Dose 22.5 mg 22.5 mg 22.5 mg 22.5mg 21.25             INR 2.7 3.1 2.9 3.5 1.8             Notes    Inc GLV  Unable to contact until 7/17                Phone Interview:  Tablet Strength: 5mg; 2.5mg  Patient Contact Info: [phone] (Mobile)  Estimated OOP cost: will send if patient comes to [place]   Verbal Release Auth:   Lab Contact Info: [facility] POCT      Patient Findings  Positives: Change in diet/appetite   Negatives: Signs/symptoms of thrombosis, Signs/symptoms of bleeding, Laboratory test error suspected, Change in health, Change in alcohol use, Change in activity, Upcoming invasive procedure, Emergency department visit, Upcoming dental procedure, Missed doses, Extra doses, Change in medications, Hospital admission, Bruising, Other complaints   Comments: Pt had a little to much greens he said    All other findings negative per pt     Plan:  1. INR was slightly SUBtherapeutic yesterday at 1.8 (goal 2.0-3.0). Unable to contact until today. Patient took his higher dose of warfarin 5 mg yesterday; therefore, instructed Mr. Houser to resume warfarin 5 mg WedSat and warfarin 2.5 mg all other days until retest.  2. Repeat INR in 2 weeks, 8/14/24  3. Verbal information provided over the phone. Kurt Houser RBV dosing instructions, expresses understanding by teach back, and has no further questions at this time.    Sherlyn Bah  Pharmacy Technician   8/1/2024 10:01 EDT    I, Madina Greco, [facility], have reviewed the note in full and agree with the assessment and plan.  08/01/24  10:25 EDT

## 2024-08-15 DIAGNOSIS — G62.9 NEUROPATHY: ICD-10-CM

## 2024-08-15 RX ORDER — GABAPENTIN 600 MG/1
1200 TABLET ORAL 3 TIMES DAILY
Qty: 180 TABLET | Refills: 0 | Status: SHIPPED | OUTPATIENT
Start: 2024-08-15

## 2024-08-20 ENCOUNTER — TELEPHONE (OUTPATIENT)
Dept: CARDIOLOGY | Facility: CLINIC | Age: 84
End: 2024-08-20
Payer: MEDICARE

## 2024-08-20 NOTE — TELEPHONE ENCOUNTER
Pt is scheduled to have a prostate biopsy done with [facility] on 9/10. Pt needs clearance to be off of their warfarin. The office also needs guidance on how long to be off the warfarin. Pt was last seen 5/2024.    Phone: [phone]  Fax: [phone]

## 2024-08-21 ENCOUNTER — ANTICOAGULATION VISIT (OUTPATIENT)
Dept: PHARMACY | Facility: HOSPITAL | Age: 84
End: 2024-08-21
Payer: MEDICARE

## 2024-08-21 ENCOUNTER — CLINICAL SUPPORT (OUTPATIENT)
Dept: CARDIOLOGY | Facility: CLINIC | Age: 84
End: 2024-08-21
Payer: MEDICARE

## 2024-08-21 DIAGNOSIS — I48.20 ATRIAL FIBRILLATION, CHRONIC: Primary | ICD-10-CM

## 2024-08-21 LAB — INR PPP: 2.1 (ref 0.9–1.1)

## 2024-08-21 NOTE — PROGRESS NOTES
Capillary Blood Specimen Collection  Capillary blood collection performed in clinic by Jossie Naqvi RN. Patient tolerated the procedure well without complications.   08/21/24   Jossie Naqvi RN

## 2024-08-21 NOTE — PROGRESS NOTES
Anticoagulation Clinic - Remote Progress Note  Remote Lab     Indication: paroxysmal afib  Referring Provider: Buddy Javier  Goal INR: 2.0-3.0  Current Drug Interactions: omega three fatty acid; CoQ-10   [id number]: 3 (HTN, Age)     Diet: ~ 2 Tablespoon QOD mixed cooked greens; green beans 2x weekly 1/12/2024  Alcohol: None  Tobacco: None  OTC Pain Medication: APAP PRN     INR History:  Date 3/23 3/30 4/8 4/16 4/23 5/7 6/4 6/18 7/2 7/19 7/28 8/11   Total Weekly Dose 27.5mg 25mg 25mg 25mg 25mg 25mg 25mg 25mg 25mg 25mg 25mg 25mg   INR 3.2 2.7 3.2 2.0 2.4 2.7 1.8 1.8 2.3 1.9 1.9 2.6   Notes     LD doxy 4/8       incr GLV   decr GLV incr GLV  incr GLV decr GLV      Date 8/25 9/22 10/20 11/18 11/29 12/28   3/2/22 3/1/ 4/5/22 4/11 4/27   Total WeeklyDose 25mg 25mg 25mg 25mg 25mg 25mg non- compliant 25mg 25 mg 25 mg 25mg 25 mg   INR 2.1 2.8 2.8 3.1 2.4 2.0   2.9 3.2 3.5 2.5 2.8   Notes       decr GLV       APAP   decr GLV          Date 5/31 6/28 7/26 8/25 9/8 10/6 10/20 11/10 11/30 1/4 2/2   Total Weekly Dose 25 mg 25 mg 25 mg 25 mg 25 mg 25 mg 25mg 25mg 25 mg 25 mg 25mg   INR 2.9 2.8 2.6 3.5 2.9 3.5 2.5 2.6 2.7 3.0 3.6   Notes                            Date 2/17  3/16 4/17 5/1 5/30 6/23  7/25 8/8 08/22 9/21 10/06   Total Weekly Dose 25mg  25 mg 25 mg  25 mg  25 mg 25mg  25 mg 22.5mg 22.5 mg 22.5 mg 22.5 mg    INR 2.7 3.3 3.5 2.4 3.1 2.9  4.0 2.5 2.9 1.9 2.6   Notes                   Inc glv        Date 10/23 10/30 11/15  11/30  12/15  1/11/24   1/17/24  1/24  2/5  2/21  3/15  3/22 4/9 4/18 4/24   Total Weekly Dose 22.5 mg  21.25mg 22.5 mg  22.5 mg  22.5mg  22.5 mg   20 mg  22.5 mg  22.5mg 22.5 mg  10 mg  22.5 mg 22.5 mg 20 mg 22.5 mg   INR 3.4 2.6 2.4  3.2  2.4  3.9    3.4   2.2  2.6  2.4  1.6  2.9 3.3 2.8 2.4   Notes APAP  Rec'd 10/24 Spoke 10/31  Spoke 11/16  APAP  spoke 12/19  Augmentin; spoke 1/12/24  holdx1; augmentin      hold x5, no GLV  Talked to pt 4/10        Date  5/9 5/31 6/24 7/16 7/31 8/21            Total  Weekly Dose 22.5 mg 22.5 mg 22.5 mg 22.5mg 21.25 22.5 mg            INR 2.7 3.1 2.9 3.5 1.8 2.1            Notes    Inc GLV  Unable to contact until 7/17                Phone Interview:  Tablet Strength: 5mg; 2.5mg  Patient Contact Info: [phone] (Mobile)  Estimated OOP cost: will send if patient comes to [place]   Verbal Release Auth:   Lab Contact Info: [facility] POCT    Patient Findings    Negatives: Signs/symptoms of thrombosis, Signs/symptoms of bleeding, Laboratory test error suspected, Change in health, Change in alcohol use, Change in activity, Upcoming invasive procedure, Emergency department visit, Upcoming dental procedure, Missed doses, Extra doses, Change in medications, Change in diet/appetite, Hospital admission, Bruising, Other complaints   Comments: All findings negative per patient.     Plan:  1. INR was therapeutic yesterday at 2.1 (goal 2.0-3.0). Patient contacted today. Instructed Mr. Houser to continue warfarin 5 mg WedSat and warfarin 2.5 mg all other days until recheck.  2. Repeat INR in 2 weeks, 9.04.24  3. Verbal information provided over the phone. Kurt Houser RBV dosing instructions, expresses understanding by teach back, and has no further questions at this time.      Ana Mason CPhT   15:20 EDT 8/22/2024      IMahesh, [facility], have reviewed the note in full and agree with the assessment and plan.  08/22/24  15:24 EDT

## 2024-08-23 DIAGNOSIS — I10 HYPERTENSION, ESSENTIAL: ICD-10-CM

## 2024-08-23 RX ORDER — LISINOPRIL 40 MG/1
40 TABLET ORAL DAILY
Qty: 90 TABLET | Refills: 0 | Status: SHIPPED | OUTPATIENT
Start: 2024-08-23

## 2024-08-23 RX ORDER — AMLODIPINE BESYLATE 5 MG/1
5 TABLET ORAL DAILY
Qty: 90 TABLET | Refills: 0 | Status: SHIPPED | OUTPATIENT
Start: 2024-08-23

## 2024-08-27 ENCOUNTER — TELEPHONE (OUTPATIENT)
Dept: PHARMACY | Facility: HOSPITAL | Age: 84
End: 2024-08-27
Payer: MEDICARE

## 2024-08-27 NOTE — TELEPHONE ENCOUNTER
Dr Javier,     We were recently informed that Kurt Houser is undergoing prostate biopsy on 9/10/2024  [facility] ([phone]). Dr. Gresham requested that the patient hold warfarin for 5 days prior to procedure.     Kurt Houser is on warfarin for paroxysmal atrial fibrillation; therefore, bridge therapy is not recommended.        9/5/24: Begin warfarin hold  9/6/24: Continue warfarin hold  9/7/24: Continue warfarin hold  9/8/24: Continue warfarin hold  9/9/24: Continue warfarin hold  9/10/24: procedure,  Resume Warfarin ( boosted dose of 7.5 mg ) the evening of unless instructed otherwise by the surgeon   9/11/24: Warfarin 7.5 mg   9/12/24: Resume maintenance warfarin regimen    9/17: INR recheck        Please advise if you are agreeable to plan above or if you prefer an alternative approach to Kurt Houser anticoagulation plan for the upcoming procedure.      Britney Werner [facility]  8/27/2024   14:23 EDT    [facility]    [phone] phone  [phone] fax

## 2024-08-28 ENCOUNTER — TELEPHONE (OUTPATIENT)
Dept: PHARMACY | Facility: HOSPITAL | Age: 84
End: 2024-08-28
Payer: MEDICARE

## 2024-08-28 NOTE — TELEPHONE ENCOUNTER
I spoke with Mr. Houser and went over the plan below in regards to his prostate biopsy 9/10.         9/5/24: Begin warfarin hold  9/6/24: Continue warfarin hold  9/7/24: Continue warfarin hold  9/8/24: Continue warfarin hold  9/9/24: Continue warfarin hold  9/10/24: procedure,  Resume Warfarin ( boosted dose of 7.5 mg ) the evening of unless instructed otherwise by the surgeon   9/11/24: Warfarin 7.5 mg   9/12/24: Resume maintenance warfarin regimen     9/17: INR recheck      Britney Werner, PharmD  8/28/2024  09:48 EDT

## 2024-09-05 DIAGNOSIS — E03.9 ACQUIRED HYPOTHYROIDISM: ICD-10-CM

## 2024-09-05 RX ORDER — LEVOTHYROXINE SODIUM 25 UG/1
25 TABLET ORAL DAILY
Qty: 60 TABLET | Refills: 0 | Status: SHIPPED | OUTPATIENT
Start: 2024-09-05

## 2024-09-09 RX ORDER — ROSUVASTATIN CALCIUM 20 MG/1
20 TABLET, COATED ORAL DAILY
Qty: 90 TABLET | Refills: 0 | Status: SHIPPED | OUTPATIENT
Start: 2024-09-09

## 2024-09-14 DIAGNOSIS — G62.9 NEUROPATHY: ICD-10-CM

## 2024-09-16 ENCOUNTER — CLINICAL SUPPORT (OUTPATIENT)
Dept: CARDIOLOGY | Facility: CLINIC | Age: 84
End: 2024-09-16
Payer: MEDICARE

## 2024-09-16 ENCOUNTER — ANTICOAGULATION VISIT (OUTPATIENT)
Dept: PHARMACY | Facility: HOSPITAL | Age: 84
End: 2024-09-16
Payer: MEDICARE

## 2024-09-16 DIAGNOSIS — I48.20 ATRIAL FIBRILLATION, CHRONIC: Primary | ICD-10-CM

## 2024-09-16 LAB — INR PPP: 2 (ref 0.9–1.1)

## 2024-09-16 PROCEDURE — 36416 COLLJ CAPILLARY BLOOD SPEC: CPT | Performed by: INTERNAL MEDICINE

## 2024-09-16 PROCEDURE — 85610 PROTHROMBIN TIME: CPT | Performed by: INTERNAL MEDICINE

## 2024-09-17 RX ORDER — GABAPENTIN 600 MG/1
1200 TABLET ORAL 3 TIMES DAILY
Qty: 180 TABLET | Refills: 0 | Status: SHIPPED | OUTPATIENT
Start: 2024-09-17

## 2024-09-19 ENCOUNTER — LAB (OUTPATIENT)
Dept: FAMILY MEDICINE CLINIC | Facility: CLINIC | Age: 84
End: 2024-09-19
Payer: MEDICARE

## 2024-09-19 DIAGNOSIS — Z79.899 ENCOUNTER FOR LONG-TERM (CURRENT) USE OF OTHER MEDICATIONS: Primary | ICD-10-CM

## 2024-09-19 PROCEDURE — 36415 COLL VENOUS BLD VENIPUNCTURE: CPT | Performed by: FAMILY MEDICINE

## 2024-09-20 LAB
ALBUMIN SERPL-MCNC: 3.9 G/DL (ref 3.7–4.7)
ALP SERPL-CCNC: 58 IU/L (ref 44–121)
ALT SERPL-CCNC: 8 IU/L (ref 0–44)
AST SERPL-CCNC: 14 IU/L (ref 0–40)
BASOPHILS # BLD AUTO: 0 X10E3/UL (ref 0–0.2)
BASOPHILS NFR BLD AUTO: 0 %
BILIRUB SERPL-MCNC: 0.3 MG/DL (ref 0–1.2)
BUN SERPL-MCNC: 20 MG/DL (ref 8–27)
BUN/CREAT SERPL: 14 (ref 10–24)
CALCIUM SERPL-MCNC: 8.9 MG/DL (ref 8.6–10.2)
CHLORIDE SERPL-SCNC: 103 MMOL/L (ref 96–106)
CHOLEST SERPL-MCNC: 148 MG/DL (ref 100–199)
CK SERPL-CCNC: 66 U/L (ref 30–208)
CO2 SERPL-SCNC: 23 MMOL/L (ref 20–29)
CREAT SERPL-MCNC: 1.42 MG/DL (ref 0.76–1.27)
EGFRCR SERPLBLD CKD-EPI 2021: 49 ML/MIN/1.73
EOSINOPHIL # BLD AUTO: 0.1 X10E3/UL (ref 0–0.4)
EOSINOPHIL NFR BLD AUTO: 3 %
ERYTHROCYTE [DISTWIDTH] IN BLOOD BY AUTOMATED COUNT: 12.9 % (ref 11.6–15.4)
GLOBULIN SER CALC-MCNC: 3.1 G/DL (ref 1.5–4.5)
GLUCOSE SERPL-MCNC: 100 MG/DL (ref 70–99)
HBA1C MFR BLD: 6.3 % (ref 4.8–5.6)
HCT VFR BLD AUTO: 33.8 % (ref 37.5–51)
HDLC SERPL-MCNC: 35 MG/DL
HGB BLD-MCNC: 10.6 G/DL (ref 13–17.7)
IMM GRANULOCYTES # BLD AUTO: 0 X10E3/UL (ref 0–0.1)
IMM GRANULOCYTES NFR BLD AUTO: 0 %
LDLC SERPL CALC-MCNC: 90 MG/DL (ref 0–99)
LYMPHOCYTES # BLD AUTO: 1.4 X10E3/UL (ref 0.7–3.1)
LYMPHOCYTES NFR BLD AUTO: 28 %
MCH RBC QN AUTO: 30.1 PG (ref 26.6–33)
MCHC RBC AUTO-ENTMCNC: 31.4 G/DL (ref 31.5–35.7)
MCV RBC AUTO: 96 FL (ref 79–97)
MONOCYTES # BLD AUTO: 0.6 X10E3/UL (ref 0.1–0.9)
MONOCYTES NFR BLD AUTO: 12 %
NEUTROPHILS # BLD AUTO: 3 X10E3/UL (ref 1.4–7)
NEUTROPHILS NFR BLD AUTO: 57 %
PLATELET # BLD AUTO: 184 X10E3/UL (ref 150–450)
POTASSIUM SERPL-SCNC: 4.3 MMOL/L (ref 3.5–5.2)
PROT SERPL-MCNC: 7 G/DL (ref 6–8.5)
RBC # BLD AUTO: 3.52 X10E6/UL (ref 4.14–5.8)
SODIUM SERPL-SCNC: 140 MMOL/L (ref 134–144)
TRIGL SERPL-MCNC: 128 MG/DL (ref 0–149)
TSH SERPL DL<=0.005 MIU/L-ACNC: 2.72 UIU/ML (ref 0.45–4.5)
VLDLC SERPL CALC-MCNC: 23 MG/DL (ref 5–40)
WBC # BLD AUTO: 5.2 X10E3/UL (ref 3.4–10.8)

## 2024-09-23 ENCOUNTER — TELEPHONE (OUTPATIENT)
Dept: FAMILY MEDICINE CLINIC | Facility: CLINIC | Age: 84
End: 2024-09-23
Payer: MEDICARE

## 2024-09-24 ENCOUNTER — CLINICAL SUPPORT (OUTPATIENT)
Dept: CARDIOLOGY | Facility: CLINIC | Age: 84
End: 2024-09-24
Payer: MEDICARE

## 2024-09-24 DIAGNOSIS — I48.20 ATRIAL FIBRILLATION, CHRONIC: Primary | ICD-10-CM

## 2024-09-24 PROCEDURE — 36416 COLLJ CAPILLARY BLOOD SPEC: CPT | Performed by: INTERNAL MEDICINE

## 2024-09-24 PROCEDURE — 85610 PROTHROMBIN TIME: CPT | Performed by: INTERNAL MEDICINE

## 2024-09-25 ENCOUNTER — TELEPHONE (OUTPATIENT)
Dept: CARDIOLOGY | Facility: CLINIC | Age: 84
End: 2024-09-25

## 2024-09-25 ENCOUNTER — ANTICOAGULATION VISIT (OUTPATIENT)
Dept: PHARMACY | Facility: HOSPITAL | Age: 84
End: 2024-09-25
Payer: MEDICARE

## 2024-09-25 LAB — INR PPP: 1.4 (ref 0.9–1.1)

## 2024-09-25 NOTE — TELEPHONE ENCOUNTER
PROTON INR RESULT - NEEDS THAT     [phone] - PHONE     [phone] - FAX    [facility]

## 2024-10-16 ENCOUNTER — CLINICAL SUPPORT (OUTPATIENT)
Dept: CARDIOLOGY | Facility: CLINIC | Age: 84
End: 2024-10-16
Payer: MEDICARE

## 2024-10-16 ENCOUNTER — ANTICOAGULATION VISIT (OUTPATIENT)
Dept: PHARMACY | Facility: HOSPITAL | Age: 84
End: 2024-10-16
Payer: MEDICARE

## 2024-10-16 DIAGNOSIS — Z79.01 LONG TERM (CURRENT) USE OF ANTICOAGULANTS: Primary | ICD-10-CM

## 2024-10-16 LAB — INR PPP: 1.7 (ref 0.9–1.1)

## 2024-10-16 PROCEDURE — 36416 COLLJ CAPILLARY BLOOD SPEC: CPT | Performed by: INTERNAL MEDICINE

## 2024-10-16 PROCEDURE — 85610 PROTHROMBIN TIME: CPT | Performed by: INTERNAL MEDICINE

## 2024-10-16 NOTE — PROGRESS NOTES
Capillary Blood Specimen Collection  Capillary blood collection performed in clinic by Nallely Schwartz MA. Patient tolerated the procedure well without complications.   10/16/24   Nallely Schwartz MA

## 2024-10-16 NOTE — PROGRESS NOTES
Anticoagulation Clinic - Remote Progress Note  Remote Lab     Indication: paroxysmal afib  Referring Provider: Buddy Javier  Goal INR: 2.0-3.0  Current Drug Interactions: omega three fatty acid; CoQ-10   [id number]: 3 (HTN, Age)     Diet: ~ 2 Tablespoon QOD mixed cooked greens; green beans 2x weekly 1/12/2024  Alcohol: None  Tobacco: None  OTC Pain Medication: APAP PRN     INR History:  Date 3/23 3/30 4/8 4/16 4/23 5/7 6/4 6/18 7/2 7/19 7/28 8/11   Total Weekly Dose 27.5mg 25mg 25mg 25mg 25mg 25mg 25mg 25mg 25mg 25mg 25mg 25mg   INR 3.2 2.7 3.2 2.0 2.4 2.7 1.8 1.8 2.3 1.9 1.9 2.6   Notes     LD doxy 4/8       incr GLV   decr GLV incr GLV  incr GLV decr GLV      Date 8/25 9/22 10/20 11/18 11/29 12/28   3/2/22 3/1/ 4/5/22 4/11 4/27   Total WeeklyDose 25mg 25mg 25mg 25mg 25mg 25mg non- compliant 25mg 25 mg 25 mg 25mg 25 mg   INR 2.1 2.8 2.8 3.1 2.4 2.0   2.9 3.2 3.5 2.5 2.8   Notes       decr GLV       APAP   decr GLV          Date 5/31 6/28 7/26 8/25 9/8 10/6 10/20 11/10 11/30 1/4 2/2   Total Weekly Dose 25 mg 25 mg 25 mg 25 mg 25 mg 25 mg 25mg 25mg 25 mg 25 mg 25mg   INR 2.9 2.8 2.6 3.5 2.9 3.5 2.5 2.6 2.7 3.0 3.6   Notes                            Date 2/17  3/16 4/17 5/1 5/30 6/23  7/25 8/8 08/22 9/21 10/06   Total Weekly Dose 25mg  25 mg 25 mg  25 mg  25 mg 25mg  25 mg 22.5mg 22.5 mg 22.5 mg 22.5 mg    INR 2.7 3.3 3.5 2.4 3.1 2.9  4.0 2.5 2.9 1.9 2.6   Notes                   Inc glv        Date 10/23 10/30 11/15  11/30  12/15  1/11/24   1/17/24  1/24  2/5  2/21  3/15  3/22 4/9 4/18 4/24   Total Weekly Dose 22.5 mg  21.25mg 22.5 mg  22.5 mg  22.5mg  22.5 mg   20 mg  22.5 mg  22.5mg 22.5 mg  10 mg  22.5 mg 22.5 mg 20 mg 22.5 mg   INR 3.4 2.6 2.4  3.2  2.4  3.9    3.4   2.2  2.6  2.4  1.6  2.9 3.3 2.8 2.4   Notes APAP  Rec'd 10/24 Spoke 10/31  Spoke 11/16  APAP  spoke 12/19  Augmentin; spoke 1/12/24  holdx1; augmentin      hold x5, no GLV  Talked to pt 4/10        Date  5/9 5/31 6/24 7/16 7/31 8/21 9/16 9/24  (9/25  date on Epic lab inaccurate) 10/16         Total Weekly Dose 22.5 mg 22.5 mg 22.5 mg 22.5mg 21.25 22.5 mg 27.5 mg 22.5 mg 22.5 mg         INR 2.7 3.1 2.9 3.5 1.8 2.1 2.0 1.4 1.7         Notes    Inc GLV  Unable to contact until 7/17    Rec'd 9/25, INR from 9/24, verified with Seema at Javier's office  Inc GLV + new MVI            Phone Interview:  Tablet Strength: 5mg; 2.5mg  Patient Contact Info: [phone] (Mobile)  Estimated OOP cost: will send if patient comes to [place]   Verbal Release Auth:   Lab Contact Info: [facility] POCT      Patient Findings    Positives: Change in medications   Negatives: Signs/symptoms of thrombosis, Signs/symptoms of bleeding, Laboratory test error suspected, Change in health, Change in alcohol use, Change in activity, Upcoming invasive procedure, Emergency department visit, Upcoming dental procedure, Missed doses, Extra doses, Change in diet/appetite, Hospital admission, Bruising, Other complaints   Comments: Patient was started on Bicalutamide 50 mg QD on 10/3/24.  Mr Houser is starting radiation next week everyday for the next 8 weeks for prostate cancer.    All other findings negative per patient.         Plan:  1. INR subtherapeutic again today at 1.7 (goal 2.0-3.0). Instructed Mr. Houser to BOOST today's dose to warfarin 7.5 mg and take warfarin 2.5 mg daily except for 5 mg on Fri/Mon until recheck. Due to consistently low INR for past 2 encounters will temporarily increase TWD to 25 mg/week (10% increase). Will need to monitor more closely since the introduction of Bicalutamide can take days to weeks to have any effect on INR.   2. Repeat INR in 1 week, 10/23.  3. Verbal information provided over the phone. Kurt Houser RBV dosing instructions, expresses understanding by teach back, and has no further questions at this time.    Britney Werner, PharmD  10/16/2024  12:55 EDT

## 2024-10-25 ENCOUNTER — ANTICOAGULATION VISIT (OUTPATIENT)
Dept: PHARMACY | Facility: HOSPITAL | Age: 84
End: 2024-10-25
Payer: MEDICARE

## 2024-10-25 ENCOUNTER — CLINICAL SUPPORT (OUTPATIENT)
Dept: CARDIOLOGY | Facility: CLINIC | Age: 84
End: 2024-10-25
Payer: MEDICARE

## 2024-10-25 DIAGNOSIS — Z79.01 LONG TERM (CURRENT) USE OF ANTICOAGULANTS: Primary | ICD-10-CM

## 2024-10-25 LAB — INR PPP: 1.7 (ref 0.9–1.1)

## 2024-10-25 PROCEDURE — 36416 COLLJ CAPILLARY BLOOD SPEC: CPT | Performed by: INTERNAL MEDICINE

## 2024-10-25 PROCEDURE — 85610 PROTHROMBIN TIME: CPT | Performed by: INTERNAL MEDICINE

## 2024-10-25 NOTE — PROGRESS NOTES
Anticoagulation Clinic - Remote Progress Note  Remote Lab     Indication: paroxysmal afib  Referring Provider: Buddy Javier  Goal INR: 2.0-3.0  Current Drug Interactions: omega three fatty acid; CoQ-10   [id number]: 3 (HTN, Age)     Diet: ~ 2 Tablespoon QOD mixed cooked greens; green beans 2x weekly 1/12/2024  Alcohol: None  Tobacco: None  OTC Pain Medication: APAP PRN     INR History:  Date 3/23 3/30 4/8 4/16 4/23 5/7 6/4 6/18 7/2 7/19 7/28 8/11   Total Weekly Dose 27.5mg 25mg 25mg 25mg 25mg 25mg 25mg 25mg 25mg 25mg 25mg 25mg   INR 3.2 2.7 3.2 2.0 2.4 2.7 1.8 1.8 2.3 1.9 1.9 2.6   Notes     LD doxy 4/8       incr GLV   decr GLV incr GLV  incr GLV decr GLV      Date 8/25 9/22 10/20 11/18 11/29 12/28   3/2/22 3/1/ 4/5/22 4/11 4/27   Total WeeklyDose 25mg 25mg 25mg 25mg 25mg 25mg non- compliant 25mg 25 mg 25 mg 25mg 25 mg   INR 2.1 2.8 2.8 3.1 2.4 2.0   2.9 3.2 3.5 2.5 2.8   Notes       decr GLV       APAP   decr GLV          Date 5/31 6/28 7/26 8/25 9/8 10/6 10/20 11/10 11/30 1/4 2/2   Total Weekly Dose 25 mg 25 mg 25 mg 25 mg 25 mg 25 mg 25mg 25mg 25 mg 25 mg 25mg   INR 2.9 2.8 2.6 3.5 2.9 3.5 2.5 2.6 2.7 3.0 3.6   Notes                            Date 2/17  3/16 4/17 5/1 5/30 6/23  7/25 8/8 08/22 9/21 10/06   Total Weekly Dose 25mg  25 mg 25 mg  25 mg  25 mg 25mg  25 mg 22.5mg 22.5 mg 22.5 mg 22.5 mg    INR 2.7 3.3 3.5 2.4 3.1 2.9  4.0 2.5 2.9 1.9 2.6   Notes                   Inc glv        Date 10/23 10/30 11/15  11/30  12/15  1/11/24   1/17/24  1/24  2/5  2/21  3/15  3/22 4/9 4/18 4/24   Total Weekly Dose 22.5 mg  21.25mg 22.5 mg  22.5 mg  22.5mg  22.5 mg   20 mg  22.5 mg  22.5mg 22.5 mg  10 mg  22.5 mg 22.5 mg 20 mg 22.5 mg   INR 3.4 2.6 2.4  3.2  2.4  3.9    3.4   2.2  2.6  2.4  1.6  2.9 3.3 2.8 2.4   Notes APAP  Rec'd 10/24 Spoke 10/31  Spoke 11/16  APAP  spoke 12/19  Augmentin; spoke 1/12/24  holdx1; augmentin      hold x5, no GLV  Talked to pt 4/10        Date  5/9 5/31 6/24 7/16 7/31 8/21 9/16 9/24  (9/25  date on Epic lab inaccurate) 10/16 10/25        Total Weekly Dose 22.5 mg 22.5 mg 22.5 mg 22.5mg 21.25 22.5 mg 27.5 mg 22.5 mg 22.5 mg 25 mg?         INR 2.7 3.1 2.9 3.5 1.8 2.1 2.0 1.4 1.7 1.7        Notes    Inc GLV  Unable to contact until 7/17    Rec'd 9/25, INR from 9/24, verified with Seema at Javier's office  Inc GLV + new MVI            Phone Interview:  Tablet Strength: 5mg; 2.5mg  Patient Contact Info: [phone] (Mobile)  Estimated OOP cost: will send if patient comes to [place]   Verbal Release Auth:   Lab Contact Info: [facility] POCT    UNABLE TO GET IN CONTACT WITH THE PATIENT. PLEASE DISREGARD THE FOLLOWING PLAN UNTIL ABLE TO GET IN CONTACT WITH PATIENT/ PATIENT REPRESENTATIVE.  [facility] 10/28      Plan:  1. INR subtherapeutic again today at 1.7 (goal 2.0-3.0). Instructed Mr. Houser to BOOST today's dose to warfarin 7.5 mg and take warfarin 2.5 mg daily except for 5 mg on Fri/Mon until recheck. Due to consistently low INR for past 2 encounters will temporarily increase TWD to 25 mg/week (10% increase). Will need to monitor more closely since the introduction of Bicalutamide can take days to weeks to have any effect on INR.   2. Repeat INR in 1 week, 10/23.  3. Verbal information provided over the phone. Kurt Mik RBV dosing instructions, expresses understanding by teach back, and has no further questions at this time.

## 2024-10-28 NOTE — PROGRESS NOTES
Anticoagulation Clinic - Remote Progress Note  Remote Lab     Indication: paroxysmal afib  Referring Provider: Buddy Javier  Goal INR: 2.0-3.0  Current Drug Interactions: omega three fatty acid; CoQ-10   [id number]: 3 (HTN, Age)     Diet: ~ 2 Tablespoon QOD mixed cooked greens; green beans 2x weekly 1/12/2024  Alcohol: None  Tobacco: None  OTC Pain Medication: APAP PRN     INR History:  Date 3/23 3/30 4/8 4/16 4/23 5/7 6/4 6/18 7/2 7/19 7/28 8/11   Total Weekly Dose 27.5mg 25mg 25mg 25mg 25mg 25mg 25mg 25mg 25mg 25mg 25mg 25mg   INR 3.2 2.7 3.2 2.0 2.4 2.7 1.8 1.8 2.3 1.9 1.9 2.6   Notes     LD doxy 4/8       incr GLV   decr GLV incr GLV  incr GLV decr GLV      Date 8/25 9/22 10/20 11/18 11/29 12/28   3/2/22 3/1/ 4/5/22 4/11 4/27   Total WeeklyDose 25mg 25mg 25mg 25mg 25mg 25mg non- compliant 25mg 25 mg 25 mg 25mg 25 mg   INR 2.1 2.8 2.8 3.1 2.4 2.0   2.9 3.2 3.5 2.5 2.8   Notes       decr GLV       APAP   decr GLV          Date 5/31 6/28 7/26 8/25 9/8 10/6 10/20 11/10 11/30 1/4 2/2   Total Weekly Dose 25 mg 25 mg 25 mg 25 mg 25 mg 25 mg 25mg 25mg 25 mg 25 mg 25mg   INR 2.9 2.8 2.6 3.5 2.9 3.5 2.5 2.6 2.7 3.0 3.6   Notes                            Date 2/17  3/16 4/17 5/1 5/30 6/23  7/25 8/8 08/22 9/21 10/06   Total Weekly Dose 25mg  25 mg 25 mg  25 mg  25 mg 25mg  25 mg 22.5mg 22.5 mg 22.5 mg 22.5 mg    INR 2.7 3.3 3.5 2.4 3.1 2.9  4.0 2.5 2.9 1.9 2.6   Notes                   Inc glv        Date 10/23 10/30 11/15  11/30  12/15  1/11/24   1/17/24  1/24  2/5  2/21  3/15  3/22 4/9 4/18 4/24   Total Weekly Dose 22.5 mg  21.25mg 22.5 mg  22.5 mg  22.5mg  22.5 mg   20 mg  22.5 mg  22.5mg 22.5 mg  10 mg  22.5 mg 22.5 mg 20 mg 22.5 mg   INR 3.4 2.6 2.4  3.2  2.4  3.9    3.4   2.2  2.6  2.4  1.6  2.9 3.3 2.8 2.4   Notes APAP  Rec'd 10/24 Spoke 10/31  Spoke 11/16  APAP  spoke 12/19  Augmentin; spoke 1/12/24  holdx1; augmentin       hold x5, no GLV   Talked to pt 4/10          Date  5/9 5/31 6/24 7/16 7/31 8/21 9/16  9/24  (9/25 date on Epic lab inaccurate) 10/16 10/25             Total Weekly Dose 22.5 mg 22.5 mg 22.5 mg 22.5mg 21.25 22.5 mg 27.5 mg 22.5 mg 22.5 mg 25 mg?              INR 2.7 3.1 2.9 3.5 1.8 2.1 2.0 1.4 1.7 1.7             Notes       Inc GLV  Unable to contact until 7/17       Rec'd 9/25, INR from 9/24, verified with Seema at Javier's office  Inc GLV + new MVI   Contacted 10/28                Phone Interview:  Tablet Strength: 5mg; 2.5mg  Patient Contact Info: [phone] (Mobile)  Estimated OOP cost: will send if patient comes to [place]   Verbal Release Auth:   Lab Contact Info: [facility] PO[facility]    Patient Findings    Positives: Change in medications   Negatives: Signs/symptoms of thrombosis, Signs/symptoms of bleeding, Laboratory test error suspected, Change in health, Change in alcohol use, Change in activity, Upcoming invasive procedure, Emergency department visit, Upcoming dental procedure, Missed doses, Extra doses, Change in diet/appetite, Hospital admission, Bruising, Other complaints   Comments: Patient reports he is now taking Bicalutamide ([facility] He has been taking it for 25 days. All other findings negative per patient.     Plan:  1. INR was subtherapeutic again 10/25 at 1.7 (goal 2.0-3.0). Patient contacted today. Per Devi Lindsay, PharmD instructed Mr. Houser to take warfarin 5 mg daily except warfarin 2.5 mg SunTuesThurs until recheck.  2. Repeat INR 11.04.24. Patient says he is unable to test Nov 1st because of radiation treatments.  3. Verbal information provided over the phone. Kurt Houser RBV dosing instructions, expresses understanding by teach back, and has no further questions at this time.           Ana Mason CPhT   11:00 EDT 10/28/2024    [facility], have reviewed the note in full and agree with the assessment and plan.  10/28/24  11:26 EDT

## 2024-10-29 DIAGNOSIS — E03.9 ACQUIRED HYPOTHYROIDISM: ICD-10-CM

## 2024-10-29 RX ORDER — LEVOTHYROXINE SODIUM 25 UG/1
25 TABLET ORAL DAILY
Qty: 30 TABLET | Refills: 0 | Status: SHIPPED | OUTPATIENT
Start: 2024-10-29

## 2024-10-31 DIAGNOSIS — E03.9 ACQUIRED HYPOTHYROIDISM: ICD-10-CM

## 2024-10-31 RX ORDER — LEVOTHYROXINE SODIUM 25 UG/1
25 TABLET ORAL DAILY
Qty: 60 TABLET | Refills: 0 | OUTPATIENT
Start: 2024-10-31

## 2024-11-03 DIAGNOSIS — G62.9 NEUROPATHY: ICD-10-CM

## 2024-11-04 RX ORDER — GABAPENTIN 600 MG/1
1200 TABLET ORAL 3 TIMES DAILY
Qty: 180 TABLET | Refills: 0 | Status: SHIPPED | OUTPATIENT
Start: 2024-11-04

## 2024-11-04 NOTE — TELEPHONE ENCOUNTER
Rx Refill Note    Requested Prescriptions     Pending Prescriptions Disp Refills    gabapentin (NEURONTIN) 600 MG tablet [Pharmacy Med Name: Gabapentin 600 MG Oral Tablet] 180 tablet 0     Sig: TAKE 2 TABLETS BY MOUTH THREE TIMES DAILY        Last office visit with prescribing clinician: 6/26/2024      Next office visit with prescribing clinician: Visit date not found   Last labs:   Last refill: 09/17/2024   Pharmacy (be sure to add in Epic). correct

## 2024-11-05 ENCOUNTER — CLINICAL SUPPORT (OUTPATIENT)
Dept: CARDIOLOGY | Facility: CLINIC | Age: 84
End: 2024-11-05
Payer: MEDICARE

## 2024-11-05 ENCOUNTER — ANTICOAGULATION VISIT (OUTPATIENT)
Dept: PHARMACY | Facility: HOSPITAL | Age: 84
End: 2024-11-05
Payer: MEDICARE

## 2024-11-05 DIAGNOSIS — Z79.01 LONG TERM (CURRENT) USE OF ANTICOAGULANTS: Primary | ICD-10-CM

## 2024-11-05 LAB — INR PPP: 1.5 (ref 0.9–1.1)

## 2024-11-05 PROCEDURE — 36416 COLLJ CAPILLARY BLOOD SPEC: CPT | Performed by: INTERNAL MEDICINE

## 2024-11-05 PROCEDURE — 85610 PROTHROMBIN TIME: CPT | Performed by: INTERNAL MEDICINE

## 2024-11-05 NOTE — PROGRESS NOTES
Capillary Blood Specimen Collection  Capillary blood collection performed in clinic by Nallely Schwartz MA. Patient tolerated the procedure well without complications.   11/05/24   Nallely Schwartz MA

## 2024-11-05 NOTE — PROGRESS NOTES
Anticoagulation Clinic - Remote Progress Note  Remote Lab     Indication: paroxysmal afib  Referring Provider: Buddy Javier  Goal INR: 2.0-3.0  Current Drug Interactions: omega three fatty acid; CoQ-10   [id number]: 3 (HTN, Age)     Diet: ~ 2 Tablespoon QOD mixed cooked greens; green beans 2x weekly 1/12/2024  Alcohol: None  Tobacco: None  OTC Pain Medication: APAP PRN     INR History:  Date 3/23 3/30 4/8 4/16 4/23 5/7 6/4 6/18 7/2 7/19 7/28 8/11   Total Weekly Dose 27.5mg 25mg 25mg 25mg 25mg 25mg 25mg 25mg 25mg 25mg 25mg 25mg   INR 3.2 2.7 3.2 2.0 2.4 2.7 1.8 1.8 2.3 1.9 1.9 2.6   Notes     LD doxy 4/8       incr GLV   decr GLV incr GLV  incr GLV decr GLV      Date 8/25 9/22 10/20 11/18 11/29 12/28   3/2/22 3/1/ 4/5/22 4/11 4/27   Total WeeklyDose 25mg 25mg 25mg 25mg 25mg 25mg non- compliant 25mg 25 mg 25 mg 25mg 25 mg   INR 2.1 2.8 2.8 3.1 2.4 2.0   2.9 3.2 3.5 2.5 2.8   Notes       decr GLV       APAP   decr GLV          Date 5/31 6/28 7/26 8/25 9/8 10/6 10/20 11/10 11/30 1/4 2/2   Total Weekly Dose 25 mg 25 mg 25 mg 25 mg 25 mg 25 mg 25mg 25mg 25 mg 25 mg 25mg   INR 2.9 2.8 2.6 3.5 2.9 3.5 2.5 2.6 2.7 3.0 3.6   Notes                            Date 2/17  3/16 4/17 5/1 5/30 6/23  7/25 8/8 08/22 9/21 10/06   Total Weekly Dose 25mg  25 mg 25 mg  25 mg  25 mg 25mg  25 mg 22.5mg 22.5 mg 22.5 mg 22.5 mg    INR 2.7 3.3 3.5 2.4 3.1 2.9  4.0 2.5 2.9 1.9 2.6   Notes                   Inc glv        Date 10/23 10/30 11/15  11/30  12/15  1/11/24   1/17/24  1/24  2/5  2/21  3/15  3/22 4/9 4/18 4/24   Total Weekly Dose 22.5 mg  21.25mg 22.5 mg  22.5 mg  22.5mg  22.5 mg   20 mg  22.5 mg  22.5mg 22.5 mg  10 mg  22.5 mg 22.5 mg 20 mg 22.5 mg   INR 3.4 2.6 2.4  3.2  2.4  3.9    3.4   2.2  2.6  2.4  1.6  2.9 3.3 2.8 2.4   Notes APAP  Rec'd 10/24 Spoke 10/31  Spoke 11/16  APAP  spoke 12/19  Augmentin; spoke 1/12/24  holdx1; augmentin       hold x5, no GLV   Talked to pt 4/10          Date  5/9 5/31 6/24 7/16 7/31 8/21 9/16  9/24  (9/25 date on Epic lab inaccurate) 10/16 10/25  11/5           Total Weekly Dose 22.5 mg 22.5 mg 22.5 mg 22.5mg 21.25 22.5 mg 27.5 mg 22.5 mg 22.5 mg 25 mg?   27.5 mg           INR 2.7 3.1 2.9 3.5 1.8 2.1 2.0 1.4 1.7 1.7  1.5           Notes       Inc GLV  Unable to contact until 7/17       Rec'd 9/25, INR from 9/24, verified with Seema at [facility]'s office  Inc GLV + new MVI   Contacted 10/28                Phone Interview:  Tablet Strength: 5mg; 2.5mg  Patient Contact Info: [phone] (Mobile)  Estimated OOP cost: will send if patient comes to [place]   Verbal Release Auth:   Lab Contact Info: [facility]    [facility]    Negatives: Signs/symptoms of thrombosis, Signs/symptoms of bleeding, Laboratory test error suspected, Change in health, Change in alcohol use, Change in activity, Upcoming invasive procedure, Emergency department visit, Upcoming dental procedure, Missed doses, Extra doses, Change in medications, Change in diet/appetite, Hospital admission, Bruising, Other complaints   Comments: Patient read back dosing instructions from last encounter. Denies any missed doses  He continues to get radiation 5x/week through December 10th    All other findings negative per patient       Plan:  1. INR was subtherapeutic again 11/5 at 1.5 (goal 2.0-3.0) despite several dose increases (10% increases each time). Instructed Mr. Houser to take warfarin 5 mg daily until recheck (~18% increase)  2. Repeat INR 11/12/24--unable to test earlier due to treatments   3. Verbal information provided over the phone. Kurt Houser RBV dosing instructions, expresses understanding by teach back, and has no further questions at this time.           Britney Werner, PharmD  11/5/2024  15:47 EST

## 2024-11-08 ENCOUNTER — TELEPHONE (OUTPATIENT)
Dept: CARDIOLOGY | Facility: CLINIC | Age: 84
End: 2024-11-08
Payer: MEDICARE

## 2024-11-08 RX ORDER — WARFARIN SODIUM 5 MG/1
5 TABLET ORAL DAILY
Qty: 90 TABLET | Refills: 1 | Status: SHIPPED | OUTPATIENT
Start: 2024-11-08

## 2024-11-12 ENCOUNTER — ANTICOAGULATION VISIT (OUTPATIENT)
Dept: PHARMACY | Facility: HOSPITAL | Age: 84
End: 2024-11-12
Payer: MEDICARE

## 2024-11-12 ENCOUNTER — CLINICAL SUPPORT (OUTPATIENT)
Dept: CARDIOLOGY | Facility: CLINIC | Age: 84
End: 2024-11-12
Payer: MEDICARE

## 2024-11-12 DIAGNOSIS — I48.20 ATRIAL FIBRILLATION, CHRONIC: Primary | ICD-10-CM

## 2024-11-12 LAB — INR PPP: 2.6 (ref 0.9–1.1)

## 2024-11-12 PROCEDURE — 36416 COLLJ CAPILLARY BLOOD SPEC: CPT | Performed by: INTERNAL MEDICINE

## 2024-11-12 PROCEDURE — 85610 PROTHROMBIN TIME: CPT | Performed by: INTERNAL MEDICINE

## 2024-11-12 NOTE — PROGRESS NOTES
Anticoagulation Clinic - Remote Progress Note  Remote Lab     Indication: paroxysmal afib  Referring Provider: Buddy Javier  Goal INR: 2.0-3.0  Current Drug Interactions: omega three fatty acid; CoQ-10   [id number]: 3 (HTN, Age)     Diet: ~ 2 Tablespoon QOD mixed cooked greens; green beans 2x weekly 1/12/2024  Alcohol: None  Tobacco: None  OTC Pain Medication: APAP PRN     INR History:  Date 3/23 3/30 4/8 4/16 4/23 5/7 6/4 6/18 7/2 7/19 7/28 8/11   Total Weekly Dose 27.5mg 25mg 25mg 25mg 25mg 25mg 25mg 25mg 25mg 25mg 25mg 25mg   INR 3.2 2.7 3.2 2.0 2.4 2.7 1.8 1.8 2.3 1.9 1.9 2.6   Notes     LD doxy 4/8       incr GLV   decr GLV incr GLV  incr GLV decr GLV      Date 8/25 9/22 10/20 11/18 11/29 12/28   3/2/22 3/1/ 4/5/22 4/11 4/27   Total WeeklyDose 25mg 25mg 25mg 25mg 25mg 25mg non- compliant 25mg 25 mg 25 mg 25mg 25 mg   INR 2.1 2.8 2.8 3.1 2.4 2.0   2.9 3.2 3.5 2.5 2.8   Notes       decr GLV       APAP   decr GLV          Date 5/31 6/28 7/26 8/25 9/8 10/6 10/20 11/10 11/30 1/4 2/2   Total Weekly Dose 25 mg 25 mg 25 mg 25 mg 25 mg 25 mg 25mg 25mg 25 mg 25 mg 25mg   INR 2.9 2.8 2.6 3.5 2.9 3.5 2.5 2.6 2.7 3.0 3.6   Notes                            Date 2/17  3/16 4/17 5/1 5/30 6/23  7/25 8/8 08/22 9/21 10/06   Total Weekly Dose 25mg  25 mg 25 mg  25 mg  25 mg 25mg  25 mg 22.5mg 22.5 mg 22.5 mg 22.5 mg    INR 2.7 3.3 3.5 2.4 3.1 2.9  4.0 2.5 2.9 1.9 2.6   Notes                   Inc glv        Date 10/23 10/30 11/15  11/30  12/15  1/11/24   1/17/24  1/24  2/5  2/21  3/15  3/22 4/9 4/18 4/24   Total Weekly Dose 22.5 mg  21.25mg 22.5 mg  22.5 mg  22.5mg  22.5 mg   20 mg  22.5 mg  22.5mg 22.5 mg  10 mg  22.5 mg 22.5 mg 20 mg 22.5 mg   INR 3.4 2.6 2.4  3.2  2.4  3.9    3.4   2.2  2.6  2.4  1.6  2.9 3.3 2.8 2.4   Notes APAP  Rec'd 10/24 Spoke 10/31  Spoke 11/16  APAP  spoke 12/19  Augmentin; spoke 1/12/24  holdx1; augmentin       hold x5, no GLV   Talked to pt 4/10          Date  5/9 5/31 6/24 7/16 7/31 8/21 9/16  9/24  (9/25 date on Epic lab inaccurate) 10/16 10/25  11/5  11/12         Total Weekly Dose 22.5 mg 22.5 mg 22.5 mg 22.5mg 21.25 22.5 mg 27.5 mg 22.5 mg 22.5 mg 25 mg?   27.5 mg  35mg          INR 2.7 3.1 2.9 3.5 1.8 2.1 2.0 1.4 1.7 1.7  1.5  2.6         Notes       Inc GLV  Unable to contact until 7/17       Rec'd 9/25, INR from 9/24, verified with Seema at [facility]'s office  Inc GLV + new MVI   Contacted 10/28                Phone Interview:  Tablet Strength: 5mg; 2.5mg  Patient Contact Info: [phone] (Mobile)  Estimated OOP cost: will send if patient comes to [place]   Verbal Release Auth:   Lab Contact Info: [facility] PO[facility]      Patient Findings:  Negatives: Signs/symptoms of thrombosis, Signs/symptoms of bleeding, Laboratory test error suspected, Change in health, Change in alcohol use, Change in activity, Upcoming invasive procedure, Emergency department visit, Upcoming dental procedure, Missed doses, Extra doses, Change in medications, Change in diet/appetite, Hospital admission, Bruising, Other complaints   Comments: All findings negative per patient.       Plan:  1. INR was therapeutic at 2.6 (goal 2.0-3.0). Instructed Mr. Houser to take warfarin 5 mg daily until recheck. Patients INR was subtherapeutic x 4 encounters, increased dose gradually each time. Will continue current dosing regimen and recheck in 1 week.   2. Repeat INR 11/19/24  3. Verbal information provided over the phone. Kurt Houser RBV dosing instructions, expresses understanding by teach back, and has no further questions at this time.       Devi Lindsay, PharmD   11/12/2024  15:18 EST

## 2024-11-12 NOTE — PROGRESS NOTES
Capillary Blood Specimen Collection  Capillary blood collection performed in clinic by Arti Chin MA. Patient tolerated the procedure well without complications.   11/12/24   Arti Chin MA

## 2024-11-14 DIAGNOSIS — I10 HYPERTENSION, ESSENTIAL: ICD-10-CM

## 2024-11-15 RX ORDER — LISINOPRIL 40 MG/1
40 TABLET ORAL DAILY
Qty: 90 TABLET | Refills: 0 | Status: SHIPPED | OUTPATIENT
Start: 2024-11-15

## 2024-11-15 RX ORDER — AMLODIPINE BESYLATE 5 MG/1
5 TABLET ORAL DAILY
Qty: 90 TABLET | Refills: 0 | Status: SHIPPED | OUTPATIENT
Start: 2024-11-15

## 2024-11-26 ENCOUNTER — ANTICOAGULATION VISIT (OUTPATIENT)
Dept: PHARMACY | Facility: HOSPITAL | Age: 84
End: 2024-11-26
Payer: MEDICARE

## 2024-11-26 ENCOUNTER — CLINICAL SUPPORT (OUTPATIENT)
Dept: CARDIOLOGY | Facility: CLINIC | Age: 84
End: 2024-11-26
Payer: MEDICARE

## 2024-11-26 ENCOUNTER — TELEPHONE (OUTPATIENT)
Dept: CARDIOLOGY | Facility: CLINIC | Age: 84
End: 2024-11-26

## 2024-11-26 DIAGNOSIS — Z79.01 LONG TERM (CURRENT) USE OF ANTICOAGULANTS: ICD-10-CM

## 2024-11-26 DIAGNOSIS — I48.20 ATRIAL FIBRILLATION, CHRONIC: Primary | ICD-10-CM

## 2024-11-26 LAB — INR PPP: 3.3 (ref 0.9–1.1)

## 2024-11-26 PROCEDURE — 36416 COLLJ CAPILLARY BLOOD SPEC: CPT | Performed by: INTERNAL MEDICINE

## 2024-11-26 PROCEDURE — 85610 PROTHROMBIN TIME: CPT | Performed by: INTERNAL MEDICINE

## 2024-11-26 NOTE — PROGRESS NOTES
Anticoagulation Clinic - Remote Progress Note  Remote Lab     Indication: paroxysmal afib  Referring Provider: Buddy Javier  Goal INR: 2.0-3.0  Current Drug Interactions: omega three fatty acid; CoQ-10   [id number]: 3 (HTN, Age)     Diet: ~ 2 Tablespoon QOD mixed cooked greens; green beans 2x weekly 1/12/2024  Alcohol: None  Tobacco: None  OTC Pain Medication: APAP PRN     INR History:  Date 3/23 3/30 4/8 4/16 4/23 5/7 6/4 6/18 7/2 7/19 7/28 8/11   Total Weekly Dose 27.5mg 25mg 25mg 25mg 25mg 25mg 25mg 25mg 25mg 25mg 25mg 25mg   INR 3.2 2.7 3.2 2.0 2.4 2.7 1.8 1.8 2.3 1.9 1.9 2.6   Notes     LD doxy 4/8       incr GLV   decr GLV incr GLV  incr GLV decr GLV      Date 8/25 9/22 10/20 11/18 11/29 12/28   3/2/22 3/1/ 4/5/22 4/11 4/27   Total WeeklyDose 25mg 25mg 25mg 25mg 25mg 25mg non- compliant 25mg 25 mg 25 mg 25mg 25 mg   INR 2.1 2.8 2.8 3.1 2.4 2.0   2.9 3.2 3.5 2.5 2.8   Notes       decr GLV       APAP   decr GLV          Date 5/31 6/28 7/26 8/25 9/8 10/6 10/20 11/10 11/30 1/4 2/2   Total Weekly Dose 25 mg 25 mg 25 mg 25 mg 25 mg 25 mg 25mg 25mg 25 mg 25 mg 25mg   INR 2.9 2.8 2.6 3.5 2.9 3.5 2.5 2.6 2.7 3.0 3.6   Notes                            Date 2/17  3/16 4/17 5/1 5/30 6/23  7/25 8/8 08/22 9/21 10/06   Total Weekly Dose 25mg  25 mg 25 mg  25 mg  25 mg 25mg  25 mg 22.5mg 22.5 mg 22.5 mg 22.5 mg    INR 2.7 3.3 3.5 2.4 3.1 2.9  4.0 2.5 2.9 1.9 2.6   Notes                   Inc glv        Date 10/23 10/30 11/15  11/30  12/15  1/11/24   1/17/24  1/24  2/5  2/21  3/15  3/22 4/9 4/18 4/24   Total Weekly Dose 22.5 mg  21.25mg 22.5 mg  22.5 mg  22.5mg  22.5 mg   20 mg  22.5 mg  22.5mg 22.5 mg  10 mg  22.5 mg 22.5 mg 20 mg 22.5 mg   INR 3.4 2.6 2.4  3.2  2.4  3.9    3.4   2.2  2.6  2.4  1.6  2.9 3.3 2.8 2.4   Notes APAP  Rec'd 10/24 Spoke 10/31  Spoke 11/16  APAP  spoke 12/19  Augmentin; spoke 1/12/24  holdx1; augmentin       hold x5, no GLV   Talked to pt 4/10          Date  5/9 5/31 6/24 7/16 7/31 8/21 9/16  9/24  (9/25 date on Epic lab inaccurate) 10/16 10/25  11/5  11/12  11/26       Total Weekly Dose 22.5 mg 22.5 mg 22.5 mg 22.5mg 21.25 22.5 mg 27.5 mg 22.5 mg 22.5 mg 25 mg?   27.5 mg  35mg   35 mg       INR 2.7 3.1 2.9 3.5 1.8 2.1 2.0 1.4 1.7 1.7  1.5  2.6  3.3       Notes       Inc GLV  Unable to contact until 7/17       Rec'd 9/25, INR from 9/24, verified with Seema at [facility]'s office  Inc GLV + new MVI   Contacted 10/28                Phone Interview:  Tablet Strength: 5mg; 2.5mg  Patient Contact Info: [phone] (Mobile)  Estimated OOP cost: will send if patient comes to [place]   Verbal Release Auth:   Lab Contact Info: [facility] PO[facility]      Patient Findings    Negatives: Signs/symptoms of thrombosis, Signs/symptoms of bleeding, Laboratory test error suspected, Change in health, Change in alcohol use, Change in activity, Upcoming invasive procedure, Emergency department visit, Upcoming dental procedure, Missed doses, Extra doses, Change in medications, Change in diet/appetite, Hospital admission, Bruising, Other complaints   Comments: Patient continues to undergo radiation treatments. Says it hasn't affected his appetite much but he is very weak.  They dont have any GLV at home and doesn't plan on eating any at [facility], so will decrease today's dose only.    All other findings negative per patient         Plan:  1. INR was SUPRAtherapeutic at 3.3 (goal 2.0-3.0). Instructed Mr. Houser to decrease today's dose to warfarin 2.5 mg and take warfarin 5 mg daily until recheck.   2. Repeat INR 12/4/24  3. Verbal information provided over the phone. Kurt Houser RBV dosing instructions, expresses understanding by teach back, and has no further questions at this time.       Britney Werner, PharmD  11/26/2024  15:43 EST

## 2024-11-26 NOTE — PROGRESS NOTES
Capillary Blood Specimen Collection  Capillary blood collection performed in clinic by Nallely Schwartz MA. Patient tolerated the procedure well without complications.   11/26/24   Nallely Schwartz MA

## 2024-12-04 ENCOUNTER — ANTICOAGULATION VISIT (OUTPATIENT)
Dept: PHARMACY | Facility: HOSPITAL | Age: 84
End: 2024-12-04
Payer: MEDICARE

## 2024-12-04 ENCOUNTER — CLINICAL SUPPORT (OUTPATIENT)
Dept: CARDIOLOGY | Facility: CLINIC | Age: 84
End: 2024-12-04
Payer: MEDICARE

## 2024-12-04 DIAGNOSIS — I48.20 ATRIAL FIBRILLATION, CHRONIC: Primary | ICD-10-CM

## 2024-12-04 DIAGNOSIS — G62.9 NEUROPATHY: ICD-10-CM

## 2024-12-04 LAB — INR PPP: 1.5 (ref 0.9–1.1)

## 2024-12-04 PROCEDURE — 36416 COLLJ CAPILLARY BLOOD SPEC: CPT | Performed by: INTERNAL MEDICINE

## 2024-12-04 PROCEDURE — 85610 PROTHROMBIN TIME: CPT | Performed by: INTERNAL MEDICINE

## 2024-12-04 NOTE — PROGRESS NOTES
Anticoagulation Clinic - Remote Progress Note  Remote Lab     Indication: paroxysmal afib  Referring Provider: Buddy Javier  Goal INR: 2.0-3.0  Current Drug Interactions: omega three fatty acid; CoQ-10   [id number]: 3 (HTN, Age)     Diet: ~ 2 Tablespoon QOD mixed cooked greens; green beans 2x weekly 1/12/2024  Alcohol: None  Tobacco: None  OTC Pain Medication: APAP PRN     INR History:  Date 3/23 3/30 4/8 4/16 4/23 5/7 6/4 6/18 7/2 7/19 7/28 8/11   Total Weekly Dose 27.5mg 25mg 25mg 25mg 25mg 25mg 25mg 25mg 25mg 25mg 25mg 25mg   INR 3.2 2.7 3.2 2.0 2.4 2.7 1.8 1.8 2.3 1.9 1.9 2.6   Notes     LD doxy 4/8       incr GLV   decr GLV incr GLV  incr GLV decr GLV      Date 8/25 9/22 10/20 11/18 11/29 12/28   3/2/22 3/1/ 4/5/22 4/11 4/27   Total WeeklyDose 25mg 25mg 25mg 25mg 25mg 25mg non- compliant 25mg 25 mg 25 mg 25mg 25 mg   INR 2.1 2.8 2.8 3.1 2.4 2.0   2.9 3.2 3.5 2.5 2.8   Notes       decr GLV       APAP   decr GLV          Date 5/31 6/28 7/26 8/25 9/8 10/6 10/20 11/10 11/30 1/4 2/2   Total Weekly Dose 25 mg 25 mg 25 mg 25 mg 25 mg 25 mg 25mg 25mg 25 mg 25 mg 25mg   INR 2.9 2.8 2.6 3.5 2.9 3.5 2.5 2.6 2.7 3.0 3.6   Notes                            Date 2/17  3/16 4/17 5/1 5/30 6/23  7/25 8/8 08/22 9/21 10/06   Total Weekly Dose 25mg  25 mg 25 mg  25 mg  25 mg 25mg  25 mg 22.5mg 22.5 mg 22.5 mg 22.5 mg    INR 2.7 3.3 3.5 2.4 3.1 2.9  4.0 2.5 2.9 1.9 2.6   Notes                   Inc glv        Date 10/23 10/30 11/15  11/30  12/15  1/11/24   1/17/24  1/24  2/5  2/21  3/15  3/22 4/9 4/18 4/24   Total Weekly Dose 22.5 mg  21.25mg 22.5 mg  22.5 mg  22.5mg  22.5 mg   20 mg  22.5 mg  22.5mg 22.5 mg  10 mg  22.5 mg 22.5 mg 20 mg 22.5 mg   INR 3.4 2.6 2.4  3.2  2.4  3.9    3.4   2.2  2.6  2.4  1.6  2.9 3.3 2.8 2.4   Notes APAP  Rec'd 10/24 Spoke 10/31  Spoke 11/16  APAP  spoke 12/19  Augmentin; spoke 1/12/24  holdx1; augmentin       hold x5, no GLV   Talked to pt 4/10          Date  5/9 5/31 6/24 7/16 7/31 8/21 9/16  9/24  (9/25 date on Epic lab inaccurate) 10/16 10/25  11/5  11/12  11/26  12/4     Total Weekly Dose 22.5 mg 22.5 mg 22.5 mg 22.5mg 21.25 22.5 mg 27.5 mg 22.5 mg 22.5 mg 25 mg?   27.5 mg  35mg   35 mg  35 mg     INR 2.7 3.1 2.9 3.5 1.8 2.1 2.0 1.4 1.7 1.7  1.5  2.6  3.3  1.5     Notes       Inc GLV  Unable to contact until 7/17       Rec'd 9/25, INR from 9/24, verified with Seema at [facility]'s office  Inc GLV + new MVI   Contacted 10/28                Phone Interview:  Tablet Strength: 5mg; 2.5mg  Patient Contact Info: [phone] (Mobile)  Estimated OOP cost: will send if patient comes to [place]   Verbal Release Auth:   Lab Contact Info: [facility] PO[facility]      Patient Findings    Positives: Change in diet/appetite   Negatives: Signs/symptoms of thrombosis, Signs/symptoms of bleeding, Laboratory test error suspected, Change in health, Change in alcohol use, Change in activity, Upcoming invasive procedure, Emergency department visit, Upcoming dental procedure, Missed doses, Extra doses, Change in medications, Hospital admission, Bruising, Other complaints   Comments: Patient ate more GLV over the past week.  He is still undergoing daily radiation treatments    All other findings negative per patient             Plan:  1. INR was SUBtherapeutic yesterday at 1.5 (goal 2.0-3.0). Spoke with patient today. Instructed Mr. Houser to BOOST  today's dose to warfarin 7.5 mg and take warfarin 5 mg daily until recheck.   2. Repeat INR 12/11/24  3. Verbal information provided over the phone. Kurt Houser RBV dosing instructions, expresses understanding by teach back, and has no further questions at this time.       Britney Werner, PharmD  12/5/2024  09:20 EST

## 2024-12-04 NOTE — PROGRESS NOTES
Capillary Blood Specimen Collection  Capillary blood collection performed in clinic by Jossie Naqvi RN. Patient tolerated the procedure well without complications.   12/04/24   Jossie Naqvi RN

## 2024-12-05 RX ORDER — GABAPENTIN 600 MG/1
1200 TABLET ORAL 3 TIMES DAILY
Qty: 90 TABLET | Refills: 0 | Status: SHIPPED | OUTPATIENT
Start: 2024-12-05

## 2024-12-05 NOTE — TELEPHONE ENCOUNTER
Rx Refill Note    Requested Prescriptions     Pending Prescriptions Disp Refills    gabapentin (NEURONTIN) 600 MG tablet [Pharmacy Med Name: Gabapentin 600 MG Oral Tablet] 180 tablet 0     Sig: TAKE 2 TABLETS BY MOUTH THREE TIMES DAILY        Last office visit with prescribing clinician: 6/26/2024      Next office visit with prescribing clinician: Visit date not found   Last labs:   Last refill: 11/04/2024   Pharmacy (be sure to add in Epic). correct

## 2024-12-10 ENCOUNTER — ANTICOAGULATION VISIT (OUTPATIENT)
Dept: PHARMACY | Facility: HOSPITAL | Age: 84
End: 2024-12-10
Payer: MEDICARE

## 2024-12-10 ENCOUNTER — CLINICAL SUPPORT (OUTPATIENT)
Dept: CARDIOLOGY | Facility: CLINIC | Age: 84
End: 2024-12-10
Payer: MEDICARE

## 2024-12-10 DIAGNOSIS — Z79.01 LONG TERM (CURRENT) USE OF ANTICOAGULANTS: Primary | ICD-10-CM

## 2024-12-10 LAB — INR PPP: 3.5 (ref 0.9–1.1)

## 2024-12-10 PROCEDURE — 36416 COLLJ CAPILLARY BLOOD SPEC: CPT | Performed by: INTERNAL MEDICINE

## 2024-12-10 PROCEDURE — 85610 PROTHROMBIN TIME: CPT | Performed by: INTERNAL MEDICINE

## 2024-12-10 RX ORDER — ROSUVASTATIN CALCIUM 20 MG/1
20 TABLET, COATED ORAL DAILY
Qty: 90 TABLET | Refills: 0 | Status: SHIPPED | OUTPATIENT
Start: 2024-12-10

## 2024-12-10 NOTE — PROGRESS NOTES
Capillary Blood Specimen Collection  Capillary blood collection performed in clinic by Nallely Schwartz MA. Patient tolerated the procedure well without complications.   12/10/24   Nallely Schwartz MA

## 2024-12-10 NOTE — PROGRESS NOTES
Anticoagulation Clinic - Remote Progress Note  Remote Lab     Indication: paroxysmal afib  Referring Provider: Buddy Javier  Goal INR: 2.0-3.0  Current Drug Interactions: omega three fatty acid; CoQ-10   [id number]: 3 (HTN, Age)     Diet: ~ 2 Tablespoon QOD mixed cooked greens; green beans 2x weekly 1/12/2024  Alcohol: None  Tobacco: None  OTC Pain Medication: APAP PRN     INR History:  Date 3/23 3/30 4/8 4/16 4/23 5/7 6/4 6/18 7/2 7/19 7/28 8/11   Total Weekly Dose 27.5mg 25mg 25mg 25mg 25mg 25mg 25mg 25mg 25mg 25mg 25mg 25mg   INR 3.2 2.7 3.2 2.0 2.4 2.7 1.8 1.8 2.3 1.9 1.9 2.6   Notes     LD doxy 4/8       incr GLV   decr GLV incr GLV  incr GLV decr GLV      Date 8/25 9/22 10/20 11/18 11/29 12/28   3/2/22 3/1/ 4/5/22 4/11 4/27   Total WeeklyDose 25mg 25mg 25mg 25mg 25mg 25mg non- compliant 25mg 25 mg 25 mg 25mg 25 mg   INR 2.1 2.8 2.8 3.1 2.4 2.0   2.9 3.2 3.5 2.5 2.8   Notes       decr GLV       APAP   decr GLV          Date 5/31 6/28 7/26 8/25 9/8 10/6 10/20 11/10 11/30 1/4 2/2   Total Weekly Dose 25 mg 25 mg 25 mg 25 mg 25 mg 25 mg 25mg 25mg 25 mg 25 mg 25mg   INR 2.9 2.8 2.6 3.5 2.9 3.5 2.5 2.6 2.7 3.0 3.6   Notes                            Date 2/17  3/16 4/17 5/1 5/30 6/23  7/25 8/8 08/22 9/21 10/06   Total Weekly Dose 25mg  25 mg 25 mg  25 mg  25 mg 25mg  25 mg 22.5mg 22.5 mg 22.5 mg 22.5 mg    INR 2.7 3.3 3.5 2.4 3.1 2.9  4.0 2.5 2.9 1.9 2.6   Notes                   Inc glv        Date 10/23 10/30 11/15  11/30  12/15  1/11/24   1/17/24  1/24  2/5  2/21  3/15  3/22 4/9 4/18 4/24   Total Weekly Dose 22.5 mg  21.25mg 22.5 mg  22.5 mg  22.5mg  22.5 mg   20 mg  22.5 mg  22.5mg 22.5 mg  10 mg  22.5 mg 22.5 mg 20 mg 22.5 mg   INR 3.4 2.6 2.4  3.2  2.4  3.9    3.4   2.2  2.6  2.4  1.6  2.9 3.3 2.8 2.4   Notes APAP  Rec'd 10/24 Spoke 10/31  Spoke 11/16  APAP  spoke 12/19  Augmentin; spoke 1/12/24  holdx1; augmentin       hold x5, no GLV   Talked to pt 4/10          Date  5/9 5/31 6/24 7/16 7/31 8/21 9/16  9/24  (9/25 date on Epic lab inaccurate) 10/16 10/25  11/5  11/12  11/26  12/4  12/10   Total Weekly Dose 22.5 mg 22.5 mg 22.5 mg 22.5mg 21.25 22.5 mg 27.5 mg 22.5 mg 22.5 mg 25 mg?   27.5 mg  35mg   35 mg  35 mg  37.5mg   INR 2.7 3.1 2.9 3.5 1.8 2.1 2.0 1.4 1.7 1.7  1.5  2.6  3.3  1.5  3.5   Notes       Inc GLV  Unable to contact until 7/17       Rec'd 9/25, INR from 9/24, verified with Seema at [facility]'s office  Inc GLV + new MVI   Contacted 10/28                Phone Interview:  Tablet Strength: 5mg; 2.5mg  Patient Contact Info: [phone] (Mobile)  Estimated OOP cost: will send if patient comes to [place]   Verbal Release Auth:   Lab Contact Info: [facility] POCT      Patient Findings        Plan:  1. INR is Supratherapeutic at 3.5 (goal 2.0-3.0). Spoke with patient today. Instructed Mr. Houser to BOOST  today's dose to warfarin 7.5 mg and take warfarin 5 mg daily until recheck.   2. Repeat INR 12/11/24  3. Verbal information provided over the phone. Kurt Mik RBV dosing instructions, expresses understanding by teach back, and has no further questions at this time.

## 2024-12-11 NOTE — PROGRESS NOTES
Anticoagulation Clinic - Remote Progress Note  Remote Lab     Indication: paroxysmal afib  Referring Provider: Buddy Javier  Goal INR: 2.0-3.0  Current Drug Interactions: omega three fatty acid; CoQ-10   [id number]: 3 (HTN, Age)     Diet: ~ 2 Tablespoon QOD mixed cooked greens; green beans 2x weekly 1/12/2024  Alcohol: None  Tobacco: None  OTC Pain Medication: APAP PRN     INR History:  Date 3/23 3/30 4/8 4/16 4/23 5/7 6/4 6/18 7/2 7/19 7/28 8/11   Total Weekly Dose 27.5mg 25mg 25mg 25mg 25mg 25mg 25mg 25mg 25mg 25mg 25mg 25mg   INR 3.2 2.7 3.2 2.0 2.4 2.7 1.8 1.8 2.3 1.9 1.9 2.6   Notes     LD doxy 4/8       incr GLV   decr GLV incr GLV  incr GLV decr GLV      Date 8/25 9/22 10/20 11/18 11/29 12/28   3/2/22 3/1/ 4/5/22 4/11 4/27   Total WeeklyDose 25mg 25mg 25mg 25mg 25mg 25mg non- compliant 25mg 25 mg 25 mg 25mg 25 mg   INR 2.1 2.8 2.8 3.1 2.4 2.0   2.9 3.2 3.5 2.5 2.8   Notes       decr GLV       APAP   decr GLV          Date 5/31 6/28 7/26 8/25 9/8 10/6 10/20 11/10 11/30 1/4 2/2   Total Weekly Dose 25 mg 25 mg 25 mg 25 mg 25 mg 25 mg 25mg 25mg 25 mg 25 mg 25mg   INR 2.9 2.8 2.6 3.5 2.9 3.5 2.5 2.6 2.7 3.0 3.6   Notes                            Date 2/17  3/16 4/17 5/1 5/30 6/23  7/25 8/8 08/22 9/21 10/06   Total Weekly Dose 25mg  25 mg 25 mg  25 mg  25 mg 25mg  25 mg 22.5mg 22.5 mg 22.5 mg 22.5 mg    INR 2.7 3.3 3.5 2.4 3.1 2.9  4.0 2.5 2.9 1.9 2.6   Notes                   Inc glv        Date 10/23 10/30 11/15  11/30  12/15  1/11/24   1/17/24  1/24  2/5  2/21  3/15  3/22 4/9 4/18 4/24   Total Weekly Dose 22.5 mg  21.25mg 22.5 mg  22.5 mg  22.5mg  22.5 mg   20 mg  22.5 mg  22.5mg 22.5 mg  10 mg  22.5 mg 22.5 mg 20 mg 22.5 mg   INR 3.4 2.6 2.4  3.2  2.4  3.9    3.4   2.2  2.6  2.4  1.6  2.9 3.3 2.8 2.4   Notes APAP  Rec'd 10/24 Spoke 10/31 Spoke 11/16  APAP  spoke 12/19 Augmentinspoke 1/12/24 holdx1; Augmentin       hold x5, no GLV   Talked to pt 4/10          Date  5/9 5/31 6/24 7/16 7/31 8/21 9/16  9/24  (9/25 date on Epic lab inaccurate) 10/16 10/25 11/5  1/12 11/26 12/4 12/10   Total Weekly Dose 22.5 mg 22.5 mg 22.5 mg 22.5mg 21.25 22.5 mg 27.5 mg 22.5 mg 22.5 mg 25 mg?  27.5 mg 35mg  35 mg 35 mg 37.5mg   INR 2.7 3.1 2.9 3.5 1.8 2.1 2.0 1.4 1.7 1.7  1.5  2.6  3.3  1.5  3.5   Notes       Inc GLV  Unable to contact until 7/17       Rec'd 9/25, INR from 9/24, verified with Seema at [email or web address]'s office  Inc GLV + new MVI   Contacted 10/28         Contacted 12/11      Phone Interview:  Tablet Strength: 5mg; 2.5 mg  Patient Contact Info: [phone] (Mobile)  Estimated OOP cost: will send if patient comes to [facility]   Verbal Release Auth:   Lab Contact Info: [facility] POCT    Patient Findings    Negatives: Signs/symptoms of thrombosis, Signs/symptoms of bleeding, Laboratory test error suspected, Change in health, Change in alcohol use, Change in activity, Upcoming invasive procedure, Emergency department visit, Upcoming dental procedure, Missed doses, Extra doses, Change in medications, Change in diet/appetite, Hospital admission, Bruising, Other complaints   Comments: All findings negative per patient.     Plan:  1. INR was supratherapeutic yesterday at 3.5 (goal 2.0-3.0). Spoke with patient today. Per Devi Lindsay, PharmD instructed Mr. Houser to REDUCE today's dose to 2.5 mg, then take warfarin 5 mg daily until recheck.   2. Repeat INR in 1 week, 12.17.24  3. Verbal information provided over the phone. Kurt Houser RBV dosing instructions, expresses understanding by teach back, and has no further questions at this time.        Ana Mason CPhT   10:37 EST 12/11/2024    I, Britney Werner [facility], have reviewed the note in full and agree with the assessment and plan.  12/11/24  12:21 EST

## 2024-12-17 ENCOUNTER — CLINICAL SUPPORT (OUTPATIENT)
Dept: CARDIOLOGY | Facility: CLINIC | Age: 84
End: 2024-12-17
Payer: MEDICARE

## 2024-12-17 ENCOUNTER — ANTICOAGULATION VISIT (OUTPATIENT)
Dept: PHARMACY | Facility: HOSPITAL | Age: 84
End: 2024-12-17
Payer: MEDICARE

## 2024-12-17 DIAGNOSIS — I48.20 ATRIAL FIBRILLATION, CHRONIC: Primary | ICD-10-CM

## 2024-12-17 LAB — INR PPP: 2 (ref 0.9–1.1)

## 2024-12-17 PROCEDURE — 85610 PROTHROMBIN TIME: CPT | Performed by: INTERNAL MEDICINE

## 2024-12-17 PROCEDURE — 36416 COLLJ CAPILLARY BLOOD SPEC: CPT | Performed by: INTERNAL MEDICINE

## 2024-12-17 NOTE — PROGRESS NOTES
Anticoagulation Clinic - Remote Progress Note  Remote Lab     Indication: paroxysmal afib  Referring Provider: Buddy Javier  Goal INR: 2.0-3.0  Current Drug Interactions: omega three fatty acid; CoQ-10   [id number]: 3 (HTN, Age)     Diet: ~ 2 Tablespoon QOD mixed cooked greens; green beans 2x weekly 1/12/2024  Alcohol: None  Tobacco: None  OTC Pain Medication: APAP PRN     INR History:  Date 3/23 3/30 4/8 4/16 4/23 5/7 6/4 6/18 7/2 7/19 7/28 8/11   Total Weekly Dose 27.5mg 25mg 25mg 25mg 25mg 25mg 25mg 25mg 25mg 25mg 25mg 25mg   INR 3.2 2.7 3.2 2.0 2.4 2.7 1.8 1.8 2.3 1.9 1.9 2.6   Notes     LD doxy 4/8       incr GLV   decr GLV incr GLV  incr GLV decr GLV      Date 8/25 9/22 10/20 11/18 11/29 12/28   3/2/22 3/1/ 4/5/22 4/11 4/27   Total WeeklyDose 25mg 25mg 25mg 25mg 25mg 25mg non- compliant 25mg 25 mg 25 mg 25mg 25 mg   INR 2.1 2.8 2.8 3.1 2.4 2.0   2.9 3.2 3.5 2.5 2.8   Notes       decr GLV       APAP   decr GLV          Date 5/31 6/28 7/26 8/25 9/8 10/6 10/20 11/10 11/30 1/4 2/2   Total Weekly Dose 25 mg 25 mg 25 mg 25 mg 25 mg 25 mg 25mg 25mg 25 mg 25 mg 25mg   INR 2.9 2.8 2.6 3.5 2.9 3.5 2.5 2.6 2.7 3.0 3.6   Notes                            Date 2/17  3/16 4/17 5/1 5/30 6/23  7/25 8/8 08/22 9/21 10/06   Total Weekly Dose 25mg  25 mg 25 mg  25 mg  25 mg 25mg  25 mg 22.5mg 22.5 mg 22.5 mg 22.5 mg    INR 2.7 3.3 3.5 2.4 3.1 2.9  4.0 2.5 2.9 1.9 2.6   Notes                   Inc glv        Date 10/23 10/30 11/15  11/30  12/15  1/11/24   1/17/24  1/24  2/5  2/21  3/15  3/22 4/9 4/18 4/24   Total Weekly Dose 22.5 mg  21.25mg 22.5 mg  22.5 mg  22.5mg  22.5 mg   20 mg  22.5 mg  22.5mg 22.5 mg  10 mg  22.5 mg 22.5 mg 20 mg 22.5 mg   INR 3.4 2.6 2.4  3.2  2.4  3.9    3.4   2.2  2.6  2.4  1.6  2.9 3.3 2.8 2.4   Notes APAP  Rec'd 10/24 Spoke 10/31 Spoke 11/16  APAP  spoke 12/19 Augmentinspoke 1/12/24 holdx1; Augmentin       hold x5, no GLV   Talked to pt 4/10          Date  5/9 5/31 6/24 7/16 7/31 8/21 9/16  9/24  (9/25 date on Epic lab inaccurate) 10/16 10/25 11/5  1/12 11/26 12/4 12/10   Total Weekly Dose 22.5 mg 22.5 mg 22.5 mg 22.5mg 21.25 22.5 mg 27.5 mg 22.5 mg 22.5 mg 25 mg?  27.5 mg 35mg  35 mg 35 mg 37.5mg   INR 2.7 3.1 2.9 3.5 1.8 2.1 2.0 1.4 1.7 1.7  1.5  2.6  3.3  1.5  3.5   Notes       Inc GLV  Unable to contact until 7/17       Rec'd 9/25, INR from 9/24, verified with Seema at [facility]'s office  Inc GLV + new MVI   Contacted 10/28         Contacted 12/11      Date 12/17                 Total Weekly Dose 32.5mg                 INR 2.0                 Notes                      Phone Interview:  Tablet Strength: 5mg; 2.5 mg  Patient Contact Info: [phone] (Mobile)  Estimated OOP cost: will send if patient comes to [place]   Verbal Release Auth:   Lab Contact Info: [facility] PO[facility]    Patient Findings:  Positives: Change in medications   Negatives: Signs/symptoms of thrombosis, Signs/symptoms of bleeding, Laboratory test error suspected, Change in health, Change in alcohol use, Change in activity, Upcoming invasive procedure, Emergency department visit, Upcoming dental procedure, Missed doses, Extra doses, Change in diet/appetite, Hospital admission, Bruising, Other complaints   Comments: Started taking a Centrum Silver vitamin (contains Vit K)  All other new findings negative         Plan:  1. INR was therapeutic yesterday at 2.0 (goal 2.0-3.0). Spoke with patient today. Instructed Mr. Houser to take warfarin 5mg daily, except warfarin 7.5mg on Wednesdays until recheck. INR back in range today after 2 out of range values, but patient started a Centrum Silver vitamin last week that contains Vit K, so INR will likely decrease. Slightly increasing dose to hopefully offset this increase in Vit K.   2. Repeat INR in 1 week, 12.26.24  3. Verbal information provided over the phone. Kurt Houser RBV dosing instructions, expresses understanding by teach back, and has no further questions at this  time.      Devi Lindsay, PharmD   12/18/2024  09:26 EST

## 2024-12-17 NOTE — PROGRESS NOTES
Venipuncture Blood Specimen Collection  Venipuncture performed in clinic by Arti Chin MA with good hemostasis. Patient tolerated the procedure well without complications.   12/17/24   Arti Chin MA

## 2024-12-23 DIAGNOSIS — G62.9 NEUROPATHY: ICD-10-CM

## 2024-12-23 RX ORDER — GABAPENTIN 600 MG/1
1200 TABLET ORAL 3 TIMES DAILY
Qty: 60 TABLET | Refills: 0 | Status: SHIPPED | OUTPATIENT
Start: 2024-12-23

## 2024-12-23 NOTE — TELEPHONE ENCOUNTER
Rx Refill Note    Requested Prescriptions     Pending Prescriptions Disp Refills    gabapentin (NEURONTIN) 600 MG tablet [Pharmacy Med Name: Gabapentin 600 MG Oral Tablet] 180 tablet 0     Sig: TAKE 2 TABLETS BY MOUTH THREE TIMES DAILY        Last office visit with prescribing clinician: 6/26/2024      Next office visit with prescribing clinician: Visit date not found   Last labs:   Last refill: 12/05/2024   Pharmacy (be sure to add in Epic). correct

## 2024-12-27 ENCOUNTER — OFFICE VISIT (OUTPATIENT)
Dept: CARDIOLOGY | Facility: CLINIC | Age: 84
End: 2024-12-27
Payer: MEDICARE

## 2024-12-27 ENCOUNTER — ANTICOAGULATION VISIT (OUTPATIENT)
Dept: PHARMACY | Facility: HOSPITAL | Age: 84
End: 2024-12-27
Payer: MEDICARE

## 2024-12-27 VITALS
OXYGEN SATURATION: 98 % | HEIGHT: 74 IN | BODY MASS INDEX: 26.82 KG/M2 | WEIGHT: 209 LBS | HEART RATE: 61 BPM | DIASTOLIC BLOOD PRESSURE: 70 MMHG | TEMPERATURE: 97 F | RESPIRATION RATE: 16 BRPM | SYSTOLIC BLOOD PRESSURE: 134 MMHG

## 2024-12-27 DIAGNOSIS — E78.2 HYPERLIPIDEMIA, MIXED: ICD-10-CM

## 2024-12-27 DIAGNOSIS — I10 HYPERTENSION, ESSENTIAL: ICD-10-CM

## 2024-12-27 DIAGNOSIS — I48.20 ATRIAL FIBRILLATION, CHRONIC: Primary | ICD-10-CM

## 2024-12-27 LAB — INR PPP: 3.4 (ref 0.9–1.1)

## 2024-12-27 NOTE — PROGRESS NOTES
Capillary Blood Specimen Collection  Capillary blood collection performed in clinic by Arti Chin MA. Patient tolerated the procedure well without complications.   12/27/24   Arti Chin MA

## 2024-12-27 NOTE — PROGRESS NOTES
[facility] [place]  [facility]  [address] [place] [address]  Dept: [phone]  Dept Fax: [phone]    Kurt Houser  1940    Follow Up Office Visit Note    History of Present Illness:  Kurt Houser is a 84 y.o. male who presents to the clinic for Follow-up.PAF - asymptomatic, no complaints only on warfarin, no AV blockers EKG SR HR 59    The following portions of the patient's history were reviewed and updated as appropriate: allergies, current medications, past family history, past medical history, past social history, past surgical history, and problem list.    Medications:  amLODIPine  B-12 capsule  CoQ-10 capsule  fish oil capsule  gabapentin  levothyroxine  lisinopril  Magnesium tablet  rosuvastatin  vitamin D3 capsule  warfarin    Subjective  Allergies   Allergen Reactions   • Acetaminophen Mental Status Change   • Hydrocodone Bitart (Antituss) [Hydrocodone] Mental Status Change   • Loratadine Hives        Past Medical History:   Diagnosis Date   • Acute osteomyelitis of foot    • Amputated toe of left foot    • Anemia of decreased vitamin B12 absorption    • BMI 27.0-27.9,adult    • Degenerative joint disease involving multiple joints    • Diverticular disease of colon    • Drug therapy    • Dyslipidemia    • Essential hypertension    • Foot osteomyelitis    • Frequent PVCs    • Glaucoma    • High risk medication use    • History of adenomatous polyp of colon    • Long term current use of anticoagulant    • Low density lipoprotein (LDL) cholesterol level below 100mg/dL    • Mixed hyperlipidemia    • Neuropathy    • Osteoarthritis    • Paroxysmal atrial fibrillation    • Polyneuropathy    • Prediabetes    • Ventricular premature beats    • Vitamin B12 deficiency    • Vitamin D deficiency        Past Surgical History:   Procedure Laterality Date   • CATARACT EXTRACTION, BILATERAL  2010   • FOOT SURGERY Left    • TRANS METATARSAL AMPUTATION  06/03/2020    [facility]  "metatarsal amputation/IV abx   • WRIST SURGERY Left        Family History   Problem Relation Age of Onset   • Colon cancer Father    • Liver cancer Brother         Social History     Socioeconomic History   • Marital status:    Tobacco Use   • Smoking status: Former     Current packs/day: 0.00     Average packs/day: 1 pack/day for 40.0 years (40.0 ttl pk-yrs)     Types: Cigarettes, Cigars     Start date:      Quit date: 2000     Years since quittin.0     Passive exposure: Current   • Smokeless tobacco: Never   • Tobacco comments:     1 cigar/day, 2.40 years   Vaping Use   • Vaping status: Never Used   Substance and Sexual Activity   • Alcohol use: Not Currently     Comment: about every 1-2 years he mighst have a drink   • Drug use: Never   • Sexual activity: Defer       Review of Systems   Constitutional: Negative.    HENT: Negative.     Respiratory: Negative.     Cardiovascular: Negative.    Endocrine: Negative.    Genitourinary: Negative.    Musculoskeletal: Negative.    Skin: Negative.    Allergic/Immunologic: Negative.    Neurological: Negative.    Hematological: Negative.    Psychiatric/Behavioral: Negative.       Cardiovascular Procedures    ECHO/MUGA:  STRESS TESTS:   CARDIAC CATH:   DEVICES:   HOLTER:   CT/MRI:   VASCULAR:   CARDIOTHORACIC:     Objective  Vitals:    24 1410   BP: 134/70   BP Location: Right arm   Patient Position: Lying   Cuff Size: Adult   Pulse: 61   Resp: 16   Temp: 97 °F (36.1 °C)   TempSrc: Infrared   SpO2: 98%   Weight: 94.8 kg (209 lb)   Height: 188 cm (74\")   PainSc: 0-No pain     Body mass index is 26.83 kg/m².     Physical Exam  Vitals reviewed.   Constitutional:       Appearance: Healthy appearance. Not in distress.   Eyes:      Pupils: Pupils are equal, round, and reactive to light.   HENT:    Mouth/Throat:      Pharynx: Oropharynx is clear.   Neck:      Thyroid: Thyroid normal.      Vascular: No JVR. JVD normal.   Pulmonary:      Effort: Pulmonary effort is " normal.      Breath sounds: Normal breath sounds. No wheezing. No rhonchi. No rales.   Chest:      Chest wall: Not tender to palpatation.   Cardiovascular:      PMI at left midclavicular line. Normal rate. Regular rhythm. Normal S1. Normal S2.       Murmurs: There is no murmur.      No gallop.  No click. No rub.   Pulses:     Intact distal pulses.      Carotid: 3+ bilaterally.     Radial: 3+ bilaterally.     Femoral: 3+ bilaterally.     Dorsalis pedis: 3+ bilaterally.     Posterior tibial: 3+ bilaterally.  Edema:     Peripheral edema absent.   Abdominal:      General: Bowel sounds are normal.      Palpations: Abdomen is soft.      Tenderness: There is no abdominal tenderness.   Musculoskeletal: Normal range of motion.         General: No tenderness.      Cervical back: Normal range of motion and neck supple. Skin:     General: Skin is warm and dry.   Neurological:      General: No focal deficit present.      Mental Status: Alert and oriented to person, place and time.        Diagnostic Data    ECG 12 Lead    Date/Time: 12/27/2024 2:34 PM  Performed by: Buddy Javier MD    Authorized by: Buddy Javier MD  Comparison: compared with previous ECG from 5/31/2024  Similar to previous ECG  Rhythm: sinus rhythm  Rate: normal  BPM: 59  Conduction: 1st degree AV block  QRS axis: normal    Clinical impression: abnormal EKG        Assessment and Plan  Diagnoses and all orders for this visit:    Atrial fibrillation,- in sinus, today never has complaints on warfarin,   -     POC Protime / INR    Hypertension, essential- BP is 120.60, on Lisinopril 40 mg, and Amlodipine 5 mg    Hyperlipidemia, mixed- on Crestor 20 mg,Ldl is 90         Return in about 6 months (around 6/27/2025) for Recheck with Dr. Javier.    Buddy Javier MD  12/27/2024

## 2024-12-27 NOTE — PROGRESS NOTES
Anticoagulation Clinic - Remote Progress Note  Remote Lab     Indication: paroxysmal afib  Referring Provider: Buddy Javier  Goal INR: 2.0-3.0  Current Drug Interactions: omega three fatty acid; CoQ-10   [id number]: 3 (HTN, Age)     Diet: ~ 2 Tablespoon QOD mixed cooked greens; green beans 2x weekly 1/12/2024  Alcohol: None  Tobacco: None  OTC Pain Medication: APAP PRN     INR History:  Date 3/23 3/30 4/8 4/16 4/23 5/7 6/4 6/18 7/2 7/19 7/28 8/11   Total Weekly Dose 27.5mg 25mg 25mg 25mg 25mg 25mg 25mg 25mg 25mg 25mg 25mg 25mg   INR 3.2 2.7 3.2 2.0 2.4 2.7 1.8 1.8 2.3 1.9 1.9 2.6   Notes     LD doxy 4/8       incr GLV   decr GLV incr GLV  incr GLV decr GLV      Date 8/25 9/22 10/20 11/18 11/29 12/28   3/2/22 3/1/ 4/5/22 4/11 4/27   Total WeeklyDose 25mg 25mg 25mg 25mg 25mg 25mg non- compliant 25mg 25 mg 25 mg 25mg 25 mg   INR 2.1 2.8 2.8 3.1 2.4 2.0   2.9 3.2 3.5 2.5 2.8   Notes       decr GLV       APAP   decr GLV          Date 5/31 6/28 7/26 8/25 9/8 10/6 10/20 11/10 11/30 1/4 2/2   Total Weekly Dose 25 mg 25 mg 25 mg 25 mg 25 mg 25 mg 25mg 25mg 25 mg 25 mg 25mg   INR 2.9 2.8 2.6 3.5 2.9 3.5 2.5 2.6 2.7 3.0 3.6   Notes                            Date 2/17  3/16 4/17 5/1 5/30 6/23  7/25 8/8 08/22 9/21 10/06   Total Weekly Dose 25mg  25 mg 25 mg  25 mg  25 mg 25mg  25 mg 22.5mg 22.5 mg 22.5 mg 22.5 mg    INR 2.7 3.3 3.5 2.4 3.1 2.9  4.0 2.5 2.9 1.9 2.6   Notes                   Inc glv        Date 10/23 10/30 11/15  11/30  12/15  1/11/24   1/17/24  1/24  2/5  2/21  3/15  3/22 4/9 4/18 4/24   Total Weekly Dose 22.5 mg  21.25mg 22.5 mg  22.5 mg  22.5mg  22.5 mg   20 mg  22.5 mg  22.5mg 22.5 mg  10 mg  22.5 mg 22.5 mg 20 mg 22.5 mg   INR 3.4 2.6 2.4  3.2  2.4  3.9    3.4   2.2  2.6  2.4  1.6  2.9 3.3 2.8 2.4   Notes APAP  Rec'd 10/24 Spoke 10/31 Spoke 11/16  APAP  spoke 12/19 Augmentinspoke 1/12/24 holdx1; Augmentin       hold x5, no GLV   Talked to pt 4/10          Date  5/9 5/31 6/24 7/16 7/31 8/21 9/16  9/24  (9/25 date on Epic lab inaccurate) 10/16 10/25 11/5  1/12 11/26 12/4 12/10   Total Weekly Dose 22.5 mg 22.5 mg 22.5 mg 22.5mg 21.25 22.5 mg 27.5 mg 22.5 mg 22.5 mg 25 mg?  27.5 mg 35mg  35 mg 35 mg 37.5mg   INR 2.7 3.1 2.9 3.5 1.8 2.1 2.0 1.4 1.7 1.7  1.5  2.6  3.3  1.5  3.5   Notes       Inc GLV  Unable to contact until 7/17       Rec'd 9/25, INR from 9/24, verified with Seema at [facility]'s office  Inc GLV + new MVI   Contacted 10/28         Contacted 12/11      Date 12/17 12/27              Total Weekly Dose 32.5mg 35mg              INR 2.0 3.4              Notes                    Phone Interview:  Tablet Strength: 5mg; 2.5 mg  Patient Contact Info: [phone] (Mobile)  Estimated OOP cost: will send if patient comes to [place]   Verbal Release Auth:   Lab Contact Info: [facility] PO[facility]      Patient Findings    Positives: Missed doses, Change in diet/appetite   Negatives: Signs/symptoms of thrombosis, Signs/symptoms of bleeding, Laboratory test error suspected, Change in health, Change in alcohol use, Change in activity, Upcoming invasive procedure, Emergency department visit, Upcoming dental procedure, Extra doses, Change in medications, Hospital admission, Bruising, Other complaints   Comments: Patient took 5mg daily despite instructions to take 7.5mg on Wednesday. He also had no greens. He plans to incorporate greens every other day again this week         Plan:  1. INR is supratherapeutic at 3.4 (goal 2.0-3.0). nstructed Mr. Houser to take warfarin 5mg daily. He will start having greens every other day again.  2. Repeat INR in 1 week, 1/3/2025  3. Verbal information provided over the phone. Kurt Houser RBV dosing instructions, expresses understanding by teach back, and has no further questions at this time.      Thank you,    David Del AngelD, MARIIA, BCPS  12/27/2024  16:23 EST

## 2025-01-13 ENCOUNTER — ANTICOAGULATION VISIT (OUTPATIENT)
Dept: PHARMACY | Facility: HOSPITAL | Age: 85
End: 2025-01-13
Payer: MEDICARE

## 2025-01-13 ENCOUNTER — CLINICAL SUPPORT (OUTPATIENT)
Dept: CARDIOLOGY | Facility: CLINIC | Age: 85
End: 2025-01-13
Payer: MEDICARE

## 2025-01-13 DIAGNOSIS — I48.20 ATRIAL FIBRILLATION, CHRONIC: Primary | ICD-10-CM

## 2025-01-13 LAB — INR PPP: 4.6 (ref 0.9–1.1)

## 2025-01-13 PROCEDURE — 85610 PROTHROMBIN TIME: CPT | Performed by: INTERNAL MEDICINE

## 2025-01-13 PROCEDURE — 36415 COLL VENOUS BLD VENIPUNCTURE: CPT | Performed by: INTERNAL MEDICINE

## 2025-01-13 PROCEDURE — 36416 COLLJ CAPILLARY BLOOD SPEC: CPT | Performed by: INTERNAL MEDICINE

## 2025-01-13 NOTE — PROGRESS NOTES
Capillary Blood Specimen Collection  Capillary blood collection performed in clinic by Nallely Schwartz MA. Patient tolerated the procedure well without complications.   01/13/25   Nallely Schwartz MA

## 2025-01-13 NOTE — PROGRESS NOTES
Venipuncture Blood Specimen Collection  Venipuncture performed in clinic by Bhumika Montalvo RN with good hemostasis. Patient tolerated the procedure well without complications.   01/13/25   Bhumika Montalvo RN

## 2025-01-13 NOTE — PROGRESS NOTES
Anticoagulation Clinic - Remote Progress Note  Remote Lab     Indication: paroxysmal afib  Referring Provider: Buddy Javier  Goal INR: 2.0-3.0  Current Drug Interactions: omega three fatty acid; CoQ-10   [id number]: 3 (HTN, Age)     Diet: ~ 2 Tablespoon QOD mixed cooked greens; green beans 2x weekly 1/12/2024  Alcohol: None  Tobacco: None  OTC Pain Medication: APAP PRN     INR History:  Date 3/23 3/30 4/8 4/16 4/23 5/7 6/4 6/18 7/2 7/19 7/28 8/11   Total Weekly Dose 27.5mg 25mg 25mg 25mg 25mg 25mg 25mg 25mg 25mg 25mg 25mg 25mg   INR 3.2 2.7 3.2 2.0 2.4 2.7 1.8 1.8 2.3 1.9 1.9 2.6   Notes     LD doxy 4/8       incr GLV   decr GLV incr GLV  incr GLV decr GLV      Date 8/25 9/22 10/20 11/18 11/29 12/28   3/2/22 3/1/ 4/5/22 4/11 4/27   Total WeeklyDose 25mg 25mg 25mg 25mg 25mg 25mg non- compliant 25mg 25 mg 25 mg 25mg 25 mg   INR 2.1 2.8 2.8 3.1 2.4 2.0   2.9 3.2 3.5 2.5 2.8   Notes       decr GLV       APAP   decr GLV          Date 5/31 6/28 7/26 8/25 9/8 10/6 10/20 11/10 11/30 1/4 2/2   Total Weekly Dose 25 mg 25 mg 25 mg 25 mg 25 mg 25 mg 25mg 25mg 25 mg 25 mg 25mg   INR 2.9 2.8 2.6 3.5 2.9 3.5 2.5 2.6 2.7 3.0 3.6   Notes                            Date 2/17  3/16 4/17 5/1 5/30 6/23  7/25 8/8 08/22 9/21 10/06   Total Weekly Dose 25mg  25 mg 25 mg  25 mg  25 mg 25mg  25 mg 22.5mg 22.5 mg 22.5 mg 22.5 mg    INR 2.7 3.3 3.5 2.4 3.1 2.9  4.0 2.5 2.9 1.9 2.6   Notes                   Inc glv        Date 10/23 10/30 11/15  11/30  12/15  1/11/24   1/17/24  1/24  2/5  2/21  3/15  3/22 4/9 4/18 4/24   Total Weekly Dose 22.5 mg  21.25mg 22.5 mg  22.5 mg  22.5mg  22.5 mg   20 mg  22.5 mg  22.5mg 22.5 mg  10 mg  22.5 mg 22.5 mg 20 mg 22.5 mg   INR 3.4 2.6 2.4  3.2  2.4  3.9    3.4   2.2  2.6  2.4  1.6  2.9 3.3 2.8 2.4   Notes APAP  Rec'd 10/24 Spoke 10/31 Spoke 11/16  APAP  spoke 12/19 Augmentinspoke 1/12/24 holdx1; Augmentin       hold x5, no GLV   Talked to pt 4/10          Date  5/9 5/31 6/24 7/16 7/31 8/21 9/16  9/24  (9/25 date on Epic lab inaccurate) 10/16 10/25 11/5  1/12 11/26 12/4 12/10   Total Weekly Dose 22.5 mg 22.5 mg 22.5 mg 22.5mg 21.25 22.5 mg 27.5 mg 22.5 mg 22.5 mg 25 mg?  27.5 mg 35mg  35 mg 35 mg 37.5mg   INR 2.7 3.1 2.9 3.5 1.8 2.1 2.0 1.4 1.7 1.7  1.5  2.6  3.3  1.5  3.5   Notes       Inc GLV  Unable to reach until 7/17       Rec'd 9/25, verified with Seema @[facility] GLV + new MVI   Contacted 10/28         Contacted 12/11      Date 12/17 12/27   1/3 1/13/25          Total Weekly Dose 32.5mg 35mg DUE  35 mg          INR 2.0 3.4    4.6          Notes  Misdosed, less GLV              Phone Interview:  Tablet Strength: 5mg; 2.5 mg  Patient Contact Info: [phone] (Mobile)  Estimated OOP cost: will send if patient comes to [place]   Verbal Release Auth:   Lab Contact Info: [facility] PO[facility]      Patient Findings  Positives: Change in medications   Negatives: Signs/symptoms of thrombosis, Signs/symptoms of bleeding, Laboratory test error suspected, Change in health, Change in alcohol use, Change in activity, Upcoming invasive procedure, Emergency department visit, Upcoming dental procedure, Missed doses, Extra doses, Change in diet/appetite, Hospital admission, Bruising, Other complaints   Comments: He has been eating GLV every other day (increased intake).  He continues to drink Ensure daily.  He has been taking more acetaminophen than usual.  He has been taking one-a-day vitamins, vitamin K 60 mcg/ tablet.  His INR appears to be labile.    He was due to recheck his INR on 1/3/25.  Discussed the importance of timely INR checks.  Discussed to monitor for s/sx of bleeding including epistaxis, hematuria, unusual bruising, hemoptysis, hematochezia as well as s/sx of stroke including impaired speech, unilateral paralysis, blurry vision and when to seek medical attention      Otherwise, above findings negative     Plan:    1. INR is supratherapeutic today at 4.6 (goal 2.0-3.0). nstructed Mr. Houser to  HOLD warfarin today, then take warfarin 5mg on Tuesday and Wednesday until he rechecks his INR on Thursday. He will continue having greens every other day again.  2. Repeat INR on Thursday, 1/16  3. Verbal information provided over the phone. Kurt Houser RBV dosing instructions, expresses understanding by teach back, and has no further questions at this time.     Amisha Santana, PharmD  1/13/2025  15:22 EST

## 2025-01-14 LAB
INR PPP: NORMAL
PROTHROMBIN TIME: NORMAL S
SPECIMEN STATUS: NORMAL

## 2025-01-16 ENCOUNTER — ANTICOAGULATION VISIT (OUTPATIENT)
Dept: PHARMACY | Facility: HOSPITAL | Age: 85
End: 2025-01-16
Payer: MEDICARE

## 2025-01-16 ENCOUNTER — CLINICAL SUPPORT (OUTPATIENT)
Dept: CARDIOLOGY | Facility: CLINIC | Age: 85
End: 2025-01-16
Payer: MEDICARE

## 2025-01-16 DIAGNOSIS — I48.20 ATRIAL FIBRILLATION, CHRONIC: Primary | ICD-10-CM

## 2025-01-16 LAB — INR PPP: 2.4 (ref 0.9–1.1)

## 2025-01-16 PROCEDURE — 36415 COLL VENOUS BLD VENIPUNCTURE: CPT | Performed by: INTERNAL MEDICINE

## 2025-01-16 PROCEDURE — 36416 COLLJ CAPILLARY BLOOD SPEC: CPT | Performed by: INTERNAL MEDICINE

## 2025-01-16 PROCEDURE — 85610 PROTHROMBIN TIME: CPT | Performed by: INTERNAL MEDICINE

## 2025-01-16 NOTE — PROGRESS NOTES
Anticoagulation Clinic - Remote Progress Note  Remote Lab     Indication: paroxysmal afib  Referring Provider: Buddy Javier  Goal INR: 2.0-3.0  Current Drug Interactions: omega three fatty acid; CoQ-10   [id number]: 3 (HTN, Age)     Diet: ~ 2 Tablespoon QOD mixed cooked greens; green beans 2x weekly 1/12/2024  Alcohol: None  Tobacco: None  OTC Pain Medication: APAP PRN     INR History:  Date 3/23 3/30 4/8 4/16 4/23 5/7 6/4 6/18 7/2 7/19 7/28 8/11   Total Weekly Dose 27.5mg 25mg 25mg 25mg 25mg 25mg 25mg 25mg 25mg 25mg 25mg 25mg   INR 3.2 2.7 3.2 2.0 2.4 2.7 1.8 1.8 2.3 1.9 1.9 2.6   Notes     LD doxy 4/8       incr GLV   decr GLV incr GLV  incr GLV decr GLV      Date 8/25 9/22 10/20 11/18 11/29 12/28   3/2/22 3/1/ 4/5/22 4/11 4/27   Total WeeklyDose 25mg 25mg 25mg 25mg 25mg 25mg non- compliant 25mg 25 mg 25 mg 25mg 25 mg   INR 2.1 2.8 2.8 3.1 2.4 2.0   2.9 3.2 3.5 2.5 2.8   Notes       decr GLV       APAP   decr GLV          Date 5/31 6/28 7/26 8/25 9/8 10/6 10/20 11/10 11/30 1/4 2/2   Total Weekly Dose 25 mg 25 mg 25 mg 25 mg 25 mg 25 mg 25mg 25mg 25 mg 25 mg 25mg   INR 2.9 2.8 2.6 3.5 2.9 3.5 2.5 2.6 2.7 3.0 3.6   Notes                            Date 2/17  3/16 4/17 5/1 5/30 6/23  7/25 8/8 08/22 9/21 10/06   Total Weekly Dose 25mg  25 mg 25 mg  25 mg  25 mg 25mg  25 mg 22.5mg 22.5 mg 22.5 mg 22.5 mg    INR 2.7 3.3 3.5 2.4 3.1 2.9  4.0 2.5 2.9 1.9 2.6   Notes                   Inc glv        Date 10/23 10/30 11/15  11/30  12/15  1/11/24   1/17/24  1/24  2/5  2/21  3/15  3/22 4/9 4/18 4/24   Total Weekly Dose 22.5 mg  21.25mg 22.5 mg  22.5 mg  22.5mg  22.5 mg   20 mg  22.5 mg  22.5mg 22.5 mg  10 mg  22.5 mg 22.5 mg 20 mg 22.5 mg   INR 3.4 2.6 2.4  3.2  2.4  3.9    3.4   2.2  2.6  2.4  1.6  2.9 3.3 2.8 2.4   Notes APAP  Rec'd 10/24 Spoke 10/31 Spoke 11/16  APAP  spoke 12/19 Augmentinspoke 1/12/24 holdx1; Augmentin       hold x5, no GLV   Talked to pt 4/10          Date  5/9 5/31 6/24 7/16 7/31 8/21 9/16  9/24  (9/25 date on Epic lab inaccurate) 10/16 10/25 11/5  1/12 11/26 12/4 12/10   Total Weekly Dose 22.5 mg 22.5 mg 22.5 mg 22.5mg 21.25 22.5 mg 27.5 mg 22.5 mg 22.5 mg 25 mg?  27.5 mg 35mg  35 mg 35 mg 37.5mg   INR 2.7 3.1 2.9 3.5 1.8 2.1 2.0 1.4 1.7 1.7  1.5  2.6  3.3  1.5  3.5   Notes       Inc GLV  Unable to reach until 7/17       Rec'd 9/25, verified with Seema @[facility] GLV + new MVI   Contacted 10/28         Contacted 12/11      Date 12/17 12/27   1/3 1/13/25 1/16         Total Weekly Dose 32.5mg 35mg DUE  35 mg 30 mg         INR 2.0 3.4    4.6 2.4         Notes  Misdosed, less GLV              Phone Interview:  Tablet Strength: 5mg; 2.5 mg  Patient Contact Info: [phone] (Mobile)  Estimated OOP cost: will send if patient comes to [place]   Verbal Release Auth:   Lab Contact Info: [facility] POCT      UNABLE TO GET IN CONTACT WITH THE PATIENT. PLEASE DISREGARD THE FOLLOWING PLAN UNTIL ABLE TO GET IN CONTACT WITH PATIENT/ PATIENT REPRESENTATIVE.  LVM 1/16      Plan:    1. INR is therapeutic today at 2.4 (goal 2.0-3.0). nstructed Mr. Houser to take warfarin 5 mg daily except for 2.5 mg Fri/Tues until recheck   2. Repeat INR 1/23  3. Verbal information provided over the phone. Kurt Houser RBV dosing instructions, expresses understanding by teach back, and has no further questions at this time.     Britney Werner, PharmD  1/16/2025  13:53 EST

## 2025-01-16 NOTE — PROGRESS NOTES
Anticoagulation Clinic - Remote Progress Note  Remote Lab     Indication: paroxysmal afib  Referring Provider: Buddy Javier  Goal INR: 2.0-3.0  Current Drug Interactions: omega three fatty acid; CoQ-10   [id number]: 3 (HTN, Age)     Diet: ~ 2 Tablespoon QOD mixed cooked greens; green beans 2x weekly 1/12/2024  Alcohol: None  Tobacco: None  OTC Pain Medication: APAP PRN     INR History:  Date 3/23 3/30 4/8 4/16 4/23 5/7 6/4 6/18 7/2 7/19 7/28 8/11   Total Weekly Dose 27.5mg 25mg 25mg 25mg 25mg 25mg 25mg 25mg 25mg 25mg 25mg 25mg   INR 3.2 2.7 3.2 2.0 2.4 2.7 1.8 1.8 2.3 1.9 1.9 2.6   Notes     LD doxy 4/8       incr GLV   decr GLV incr GLV  incr GLV decr GLV      Date 8/25 9/22 10/20 11/18 11/29 12/28   3/2/22 3/1/ 4/5/22 4/11 4/27   Total WeeklyDose 25mg 25mg 25mg 25mg 25mg 25mg non- compliant 25mg 25 mg 25 mg 25mg 25 mg   INR 2.1 2.8 2.8 3.1 2.4 2.0   2.9 3.2 3.5 2.5 2.8   Notes       decr GLV       APAP   decr GLV          Date 5/31 6/28 7/26 8/25 9/8 10/6 10/20 11/10 11/30 1/4 2/2   Total Weekly Dose 25 mg 25 mg 25 mg 25 mg 25 mg 25 mg 25mg 25mg 25 mg 25 mg 25mg   INR 2.9 2.8 2.6 3.5 2.9 3.5 2.5 2.6 2.7 3.0 3.6   Notes                            Date 2/17  3/16 4/17 5/1 5/30 6/23  7/25 8/8 08/22 9/21 10/06   Total Weekly Dose 25mg  25 mg 25 mg  25 mg  25 mg 25mg  25 mg 22.5mg 22.5 mg 22.5 mg 22.5 mg    INR 2.7 3.3 3.5 2.4 3.1 2.9  4.0 2.5 2.9 1.9 2.6   Notes                   Inc glv        Date 10/23 10/30 11/15  11/30  12/15  1/11/24   1/17/24  1/24  2/5  2/21  3/15  3/22 4/9 4/18 4/24   Total Weekly Dose 22.5 mg  21.25mg 22.5 mg  22.5 mg  22.5mg  22.5 mg   20 mg  22.5 mg  22.5mg 22.5 mg  10 mg  22.5 mg 22.5 mg 20 mg 22.5 mg   INR 3.4 2.6 2.4  3.2  2.4  3.9    3.4   2.2  2.6  2.4  1.6  2.9 3.3 2.8 2.4   Notes APAP  Rec'd 10/24 Spoke 10/31 Spoke 11/16  APAP  spoke 12/19 Augmentinspoke 1/12/24 holdx1; Augmentin       hold x5, no GLV   Talked to pt 4/10          Date  5/9 5/31 6/24 7/16 7/31 8/21 9/16  9/24  (9/25 date on Epic lab inaccurate) 10/16 10/25 11/5  1/12 11/26 12/4 12/10   Total Weekly Dose 22.5 mg 22.5 mg 22.5 mg 22.5mg 21.25 22.5 mg 27.5 mg 22.5 mg 22.5 mg 25 mg?  27.5 mg 35mg  35 mg 35 mg 37.5mg   INR 2.7 3.1 2.9 3.5 1.8 2.1 2.0 1.4 1.7 1.7  1.5  2.6  3.3  1.5  3.5   Notes       Inc GLV  Unable to reach until 7/17       Rec'd 9/25, verified with Seema @[facility] GLV + new MVI   Contacted 10/28         Contacted 12/11      Date 12/17 12/27   1/3 1/13/25 1/16         Total Weekly Dose 32.5mg 35mg DUE  35 mg 30 mg         INR 2.0 3.4    4.6 2.4         Notes  Misdosed, less GLV              Phone Interview:  Tablet Strength: 5mg; 2.5 mg  Patient Contact Info: [phone] (Mobile)  Estimated OOP cost: will send if patient comes to [facility]   Verbal Release Auth:   Lab Contact Info: [facility] POCT      Patient Findings  Positives: Change in medications   Negatives: Signs/symptoms of thrombosis, Signs/symptoms of bleeding, Laboratory test error suspected, Change in health, Change in alcohol use, Change in activity, Upcoming invasive procedure, Emergency department visit, Upcoming dental procedure, Missed doses, Extra doses, Change in diet/appetite, Hospital admission, Bruising, Other complaints   Comments: Mr. Houser plans to start taking Osteo Bi-Flex    All other findings negative per patient       1. INR is therapeutic today at 2.4 (goal 2.0-3.0). Per Britney Werner, PharmD Instructed Mr. Houser to take Warfarin 5 mg oral daily except Warfarin 2.5 mg Friday and Tuesday until recheck   2. Repeat INR 1/23/26  3. Verbal information provided over the phone. Kurt Houser RBV dosing instructions, expresses understanding by teach back, and has no further questions at this time.     Aren Muse, Pharmacy Technician  1/16/2025  15:47 EST    I, Britney Werner, PharmD, have reviewed the note in full and agree with the assessment and plan.  01/17/25  08:20 EST

## 2025-01-16 NOTE — PROGRESS NOTES
Capillary Blood Specimen Collection  Capillary blood collection performed in clinic by Jossie Naqvi RN. Patient tolerated the procedure well without complications.   01/16/25   Jossie Naqvi RN

## 2025-01-17 ENCOUNTER — OUTSIDE FACILITY SERVICE (OUTPATIENT)
Dept: CARDIOLOGY | Facility: CLINIC | Age: 85
End: 2025-01-17
Payer: MEDICARE

## 2025-01-17 PROCEDURE — 93010 ELECTROCARDIOGRAM REPORT: CPT | Performed by: INTERNAL MEDICINE

## 2025-01-18 ENCOUNTER — OUTSIDE FACILITY SERVICE (OUTPATIENT)
Dept: CARDIOLOGY | Facility: CLINIC | Age: 85
End: 2025-01-18
Payer: MEDICARE

## 2025-01-18 PROCEDURE — 93306 TTE W/DOPPLER COMPLETE: CPT | Performed by: INTERNAL MEDICINE

## 2025-01-18 PROCEDURE — 99222 1ST HOSP IP/OBS MODERATE 55: CPT | Performed by: INTERNAL MEDICINE

## 2025-01-19 ENCOUNTER — OUTSIDE FACILITY SERVICE (OUTPATIENT)
Dept: CARDIOLOGY | Facility: CLINIC | Age: 85
End: 2025-01-19
Payer: MEDICARE

## 2025-01-19 PROCEDURE — 99232 SBSQ HOSP IP/OBS MODERATE 35: CPT | Performed by: INTERNAL MEDICINE

## 2025-01-20 ENCOUNTER — OUTSIDE FACILITY SERVICE (OUTPATIENT)
Dept: CARDIOLOGY | Facility: CLINIC | Age: 85
End: 2025-01-20
Payer: MEDICARE

## 2025-01-20 PROCEDURE — 99232 SBSQ HOSP IP/OBS MODERATE 35: CPT | Performed by: INTERNAL MEDICINE

## 2025-01-21 ENCOUNTER — OUTSIDE FACILITY SERVICE (OUTPATIENT)
Dept: CARDIOLOGY | Facility: CLINIC | Age: 85
End: 2025-01-21
Payer: MEDICARE

## 2025-01-21 PROCEDURE — 93312 ECHO TRANSESOPHAGEAL: CPT | Performed by: INTERNAL MEDICINE

## 2025-01-21 PROCEDURE — 99232 SBSQ HOSP IP/OBS MODERATE 35: CPT | Performed by: INTERNAL MEDICINE

## 2025-01-21 PROCEDURE — 93321 DOPPLER ECHO F-UP/LMTD STD: CPT | Performed by: INTERNAL MEDICINE

## 2025-01-21 PROCEDURE — 93325 DOPPLER ECHO COLOR FLOW MAPG: CPT | Performed by: INTERNAL MEDICINE

## 2025-01-22 ENCOUNTER — OUTSIDE FACILITY SERVICE (OUTPATIENT)
Dept: CARDIOLOGY | Facility: CLINIC | Age: 85
End: 2025-01-22
Payer: MEDICARE

## 2025-01-22 PROCEDURE — 99232 SBSQ HOSP IP/OBS MODERATE 35: CPT | Performed by: INTERNAL MEDICINE

## 2025-01-23 ENCOUNTER — OUTSIDE FACILITY SERVICE (OUTPATIENT)
Dept: CARDIOLOGY | Facility: CLINIC | Age: 85
End: 2025-01-23
Payer: MEDICARE

## 2025-01-23 DIAGNOSIS — E03.9 ACQUIRED HYPOTHYROIDISM: ICD-10-CM

## 2025-01-23 PROCEDURE — 99232 SBSQ HOSP IP/OBS MODERATE 35: CPT | Performed by: INTERNAL MEDICINE

## 2025-01-23 RX ORDER — LEVOTHYROXINE SODIUM 25 UG/1
25 TABLET ORAL DAILY
Qty: 90 TABLET | Refills: 0 | Status: SHIPPED | OUTPATIENT
Start: 2025-01-23

## 2025-01-24 ENCOUNTER — OUTSIDE FACILITY SERVICE (OUTPATIENT)
Dept: CARDIOLOGY | Facility: CLINIC | Age: 85
End: 2025-01-24
Payer: MEDICARE

## 2025-01-24 PROCEDURE — 93018 CV STRESS TEST I&R ONLY: CPT | Performed by: INTERNAL MEDICINE

## 2025-01-24 PROCEDURE — 99232 SBSQ HOSP IP/OBS MODERATE 35: CPT | Performed by: INTERNAL MEDICINE

## 2025-01-24 PROCEDURE — 78452 HT MUSCLE IMAGE SPECT MULT: CPT | Performed by: INTERNAL MEDICINE

## 2025-01-27 ENCOUNTER — OUTSIDE FACILITY SERVICE (OUTPATIENT)
Dept: CARDIOLOGY | Facility: CLINIC | Age: 85
End: 2025-01-27
Payer: MEDICARE

## 2025-01-27 PROCEDURE — 99232 SBSQ HOSP IP/OBS MODERATE 35: CPT | Performed by: INTERNAL MEDICINE

## 2025-01-28 ENCOUNTER — OUTSIDE FACILITY SERVICE (OUTPATIENT)
Dept: CARDIOLOGY | Facility: CLINIC | Age: 85
End: 2025-01-28
Payer: MEDICARE

## 2025-01-28 PROCEDURE — 99232 SBSQ HOSP IP/OBS MODERATE 35: CPT | Performed by: INTERNAL MEDICINE

## 2025-01-29 ENCOUNTER — OUTSIDE FACILITY SERVICE (OUTPATIENT)
Dept: CARDIOLOGY | Facility: CLINIC | Age: 85
End: 2025-01-29
Payer: MEDICARE

## 2025-01-29 PROCEDURE — 99232 SBSQ HOSP IP/OBS MODERATE 35: CPT | Performed by: INTERNAL MEDICINE

## 2025-01-30 ENCOUNTER — OUTSIDE FACILITY SERVICE (OUTPATIENT)
Dept: CARDIOLOGY | Facility: CLINIC | Age: 85
End: 2025-01-30
Payer: MEDICARE

## 2025-01-30 PROCEDURE — 99232 SBSQ HOSP IP/OBS MODERATE 35: CPT | Performed by: INTERNAL MEDICINE

## 2025-02-10 ENCOUNTER — TELEPHONE (OUTPATIENT)
Dept: CARDIOLOGY | Facility: CLINIC | Age: 85
End: 2025-02-10
Payer: MEDICARE

## 2025-02-10 NOTE — TELEPHONE ENCOUNTER
Pt's daughter, Emily, called on behalf of the patient. After going septic on 1/17 and being in the hospital for about 2 weeks, pt has been discharged to the [facility]. They are concerned about his INR. They are stating pt went from 2.1 to 7.1 in a matter of 4 days.  The clinical staff at [facility] are not liking the Coumadin for the pt at the moment and are requesting to switch the pt to Eliquis 5mg BID. Also stated his hemoglobin is running low but they are more concerned about his INR. He is only taking his daily medications now, and no antibiotics.  She requested we reach out to both her and the [facility] to discuss options. When calling [facility], ask for Deloris at .   Emily can be reached at

## 2025-02-11 NOTE — TELEPHONE ENCOUNTER
[facility] and advised that Mr. Houser's INR's are all over the place and their doctor is wondering if Javier would be ok with changing from coumadin to Eliquis or Xarelto.  Explained that we have tried in past and the cost is too expensive so that is why he is on coumadin.  She states his INR's are today is 6.3 but last Friday was 1.7. She is worried when he goes home it will not get regulated.  She also states his hgb is running low, 6.7 but he is not having any s/s of bleeding, sob.  They are trying to send a stool sample for occult blood.  Discussed with them he should maybe see a hematologist.  I advised I would check the pricing of eliquis and xarelto and call back.

## 2025-02-13 NOTE — TELEPHONE ENCOUNTER
Spoke with [facility] and advised them that I am trying to work with his pharmacy insurance to get Eliquis approved and to a cheaper tier so we can d/c the coumadin for safety reasons.  She advised that Mr. Houser's hgb today was 6.6 and they have placed the coumadin on hold for now (INR was 2.0).  His stool was negative for occult blood and he refused to go to ER but has agreed to go out tomorrow for a blood transfusion 1 unit.  They will continue to monitor his INR restart the coumadin if needed.  Advised her that unfortunately Dr. Javier is out of office til Monday.  I will continue to work on the Eliquis price and call them hopefully Monday with an answer.

## 2025-02-14 DIAGNOSIS — I10 HYPERTENSION, ESSENTIAL: ICD-10-CM

## 2025-02-14 RX ORDER — LISINOPRIL 40 MG/1
40 TABLET ORAL DAILY
Qty: 90 TABLET | Refills: 0 | Status: SHIPPED | OUTPATIENT
Start: 2025-02-14

## 2025-02-14 RX ORDER — AMLODIPINE BESYLATE 5 MG/1
5 TABLET ORAL DAILY
Qty: 90 TABLET | Refills: 0 | Status: SHIPPED | OUTPATIENT
Start: 2025-02-14

## 2025-02-19 ENCOUNTER — TELEPHONE (OUTPATIENT)
Dept: CARDIOLOGY | Facility: CLINIC | Age: 85
End: 2025-02-19
Payer: MEDICARE

## 2025-02-19 RX ORDER — DABIGATRAN ETEXILATE 150 MG/1
150 CAPSULE ORAL 2 TIMES DAILY
COMMUNITY
End: 2025-02-19 | Stop reason: SDUPTHER

## 2025-02-19 RX ORDER — DABIGATRAN ETEXILATE 150 MG/1
150 CAPSULE ORAL 2 TIMES DAILY
Qty: 60 CAPSULE | Refills: 6 | Status: SHIPPED | OUTPATIENT
Start: 2025-02-19

## 2025-02-19 NOTE — TELEPHONE ENCOUNTER
----- Message from Josise LUNDBERG sent at 2/13/2025  2:43 PM EST -----  Waiting on [employer] reply for eliquis

## 2025-02-26 ENCOUNTER — TELEPHONE (OUTPATIENT)
Dept: CARDIOLOGY | Facility: CLINIC | Age: 85
End: 2025-02-26
Payer: MEDICARE

## 2025-02-26 NOTE — TELEPHONE ENCOUNTER
Dabigatran 150mg capsules  Outcome  Approved on February 19 by [employer] NCPDP 2017  PA Case: [id number], Status: Approved, Coverage Starts on: 1/1/2025 12:00:00 AM, Coverage Ends on: 12/31/2025 12:00:00 AM. Questions? Contact [phone].  Authorization Expiration Date: 12/30/2025

## 2025-03-20 RX ORDER — DABIGATRAN ETEXILATE 150 MG/1
150 CAPSULE ORAL 2 TIMES DAILY
Qty: 180 CAPSULE | Refills: 3 | Status: SHIPPED | OUTPATIENT
Start: 2025-03-20 | End: 2025-03-24 | Stop reason: SDUPTHER

## 2025-03-24 ENCOUNTER — TELEPHONE (OUTPATIENT)
Dept: FAMILY MEDICINE CLINIC | Facility: CLINIC | Age: 85
End: 2025-03-24

## 2025-03-24 ENCOUNTER — READMISSION MANAGEMENT (OUTPATIENT)
Dept: CALL CENTER | Facility: HOSPITAL | Age: 85
End: 2025-03-24
Payer: MEDICARE

## 2025-03-24 RX ORDER — DABIGATRAN ETEXILATE 150 MG/1
150 CAPSULE ORAL 2 TIMES DAILY
Qty: 180 CAPSULE | Refills: 3 | Status: SHIPPED | OUTPATIENT
Start: 2025-03-24

## 2025-03-24 NOTE — OUTREACH NOTE
Prep Survey      Flowsheet Row Responses   [facility] facility patient discharged from? Non-BH   Is LACE score < 7 ? Non-BH Discharge   Eligibility [facility]   Date of Discharge 03/25/25   Discharge Disposition Home or Self Care   Does the patient have one of the following disease processes/diagnoses(primary or secondary)? Other   Prep survey completed? Yes            Purnima LUNDBERG - Registered Nurse

## 2025-03-24 NOTE — TELEPHONE ENCOUNTER
Caller: SCHEDULING DEPARTMENT    Best call back number:      [phone]     New or established patient?  [] New  [x] Established    Date of discharge:     3/25    Facility discharged from:     [facility]    Diagnosis/Symptoms:     REHABILITATION    Length of stay (If applicable):     ADMITTED - 1/30

## 2025-03-26 ENCOUNTER — TRANSITIONAL CARE MANAGEMENT TELEPHONE ENCOUNTER (OUTPATIENT)
Dept: CALL CENTER | Facility: HOSPITAL | Age: 85
End: 2025-03-26
Payer: MEDICARE

## 2025-03-26 NOTE — OUTREACH NOTE
Call Center TCM Note      Flowsheet Row Responses   [facility] patient discharged from? Non-  [[facility]]   Does the patient have one of the following disease processes/diagnoses(primary or secondary)? Other   TCM attempt successful? Yes   Call start time 0843   Call end time 0847   List who call center can speak with spouse- Lizabeth   Person spoke with today (if not patient) and relationship spouse- Lizabeth   Does the patient have all medications ordered at discharge? Yes   Is the patient taking all medications as directed (includes completed medication regime)? Yes   Comments Mychart Video Visit with PCP Dr. Cordova for tomorrow (3/27)   Does the patient have an appointment with their PCP within 7-14 days of discharge? Yes   Has home health visited the patient within 72 hours of discharge? N/A   Psychosocial issues? No   What is the patient's perception of their health status since discharge? Improving   Is the patient/caregiver able to teach back signs and symptoms related to disease process for when to call PCP? Yes   Is the patient/caregiver able to teach back signs and symptoms related to disease process for when to call 911? Yes   Is the patient/caregiver able to teach back the hierarchy of who to call/visit for symptoms/problems? PCP, Specialist, Home health nurse, Urgent Care, ED, 911 Yes   TCM call completed? Yes   Wrap up additional comments Per spouse, patient is doing well, denies any questions or concerns, confirmed mychart video visit wt PCP Dr. Cordova for tomorrow (3/27).   Call end time 0847   Would this patient benefit from a Referral to [facility] Social Work? No   Is the patient interested in additional calls from an ambulatory ? No            Amanda RODRIGUEZ - Registered Nurse    3/26/2025, 08:47 EDT

## 2025-03-27 ENCOUNTER — TELEMEDICINE (OUTPATIENT)
Dept: FAMILY MEDICINE CLINIC | Facility: CLINIC | Age: 85
End: 2025-03-27
Payer: MEDICARE

## 2025-03-27 DIAGNOSIS — E53.8 VITAMIN B12 DEFICIENCY: ICD-10-CM

## 2025-03-27 DIAGNOSIS — E55.9 VITAMIN D DEFICIENCY: ICD-10-CM

## 2025-03-27 DIAGNOSIS — G62.9 NEUROPATHY: ICD-10-CM

## 2025-03-27 DIAGNOSIS — Z00.00 MEDICARE ANNUAL WELLNESS VISIT, SUBSEQUENT: Primary | ICD-10-CM

## 2025-03-27 DIAGNOSIS — E78.2 HYPERLIPIDEMIA, MIXED: ICD-10-CM

## 2025-03-27 DIAGNOSIS — R73.9 HYPERGLYCEMIA: ICD-10-CM

## 2025-03-27 DIAGNOSIS — I10 HYPERTENSION, ESSENTIAL: ICD-10-CM

## 2025-03-27 RX ORDER — POTASSIUM CHLORIDE 1500 MG/1
20 TABLET, EXTENDED RELEASE ORAL 2 TIMES DAILY
COMMUNITY

## 2025-03-27 RX ORDER — OXYCODONE HYDROCHLORIDE 5 MG/1
5 TABLET ORAL EVERY 4 HOURS PRN
COMMUNITY

## 2025-03-27 NOTE — ASSESSMENT & PLAN NOTE
Discussed with patient to monitor their blood pressure and if systolic blood pressure goes above 140 or diastolic is above 90 to return to clinic.  Take medicines as directed, call for any problems, patient not having or any worrisome symptoms.        Orders:    Comprehensive Metabolic Panel; Future    Hemoglobin A1c; Future    Lipid Panel; Future    CBC & Differential; Future    Vitamin B12; Future    Vitamin D,25-Hydroxy; Future    TSH Rfx On Abnormal To Free T4; Future    Measles / Mumps / Rubella Immunity; Future

## 2025-03-27 NOTE — ASSESSMENT & PLAN NOTE
BW in May    Orders:    Comprehensive Metabolic Panel; Future    Hemoglobin A1c; Future    Lipid Panel; Future    CBC & Differential; Future    Vitamin B12; Future    Vitamin D,25-Hydroxy; Future    TSH Rfx On Abnormal To Free T4; Future    Measles / Mumps / Rubella Immunity; Future

## 2025-03-27 NOTE — PROGRESS NOTES
Subjective   The ABCs of the Annual Wellness Visit  Medicare Wellness Visit      Kurt Houser is a 84 y.o. patient who presents for a Medicare Wellness Visit.    The following portions of the patient's history were reviewed and   updated as appropriate: allergies, current medications, past family history, past medical history, past social history, past surgical history, and problem list.    Compared to one year ago, the patient's physical   health is the same.  Compared to one year ago, the patient's mental   health is the same.    Recent Hospitalizations:  He was admitted within the past 365 days at [facility].     Current Medical Providers:  Patient Care Team:  Leo Cordova MD as PCP - General (Family Medicine)  Buddy Javier MD as Consulting Physician (Cardiology)    Outpatient Medications Prior to Visit   Medication Sig Dispense Refill    amLODIPine (NORVASC) 5 MG tablet Take 1 tablet by mouth once daily 90 tablet 0    Coenzyme Q10 (CoQ-10) 100 MG capsule Take  by mouth Daily.      Cyanocobalamin (B-12) 1000 MCG capsule Take 1,000 mg by mouth Daily. 90 capsule 1    dabigatran etexilate (PRADAXA) 150 MG capsu Take 1 capsule by mouth 2 (Two) Times a Day. 180 capsule 3    gabapentin (NEURONTIN) 600 MG tablet TAKE 2 TABLETS BY MOUTH THREE TIMES DAILY 60 tablet 0    levothyroxine (SYNTHROID, LEVOTHROID) 25 MCG tablet Take 1 tablet by mouth once daily 90 tablet 0    lisinopril (PRINIVIL,ZESTRIL) 40 MG tablet Take 1 tablet by mouth once daily 90 tablet 0    Magnesium 250 MG tablet Take 1 tablet by mouth Daily.      Omega-3 Fatty Acids (fish oil) 1000 MG capsule capsule Take  by mouth Daily With Breakfast.      oxyCODONE (ROXICODONE) 5 MG immediate release tablet Take 1 tablet by mouth Every 4 (Four) Hours As Needed for Moderate Pain.      potassium chloride (KLOR-CON M20) 20 MEQ CR tablet Take 1 tablet by mouth 2 (Two) Times a Day.      rosuvastatin (CRESTOR) 20 MG tablet Take 1 tablet by mouth once  "daily 90 tablet 0    vitamin D3 125 MCG (5000 UT) capsule capsule Take 1 capsule by mouth once daily 90 capsule 0     No facility-administered medications prior to visit.     Opioid medication/s are on active medication list.  and I have evaluated his active treatment plan and pain score trends (see table).  There were no vitals filed for this visit.  I have reviewed the chart for potential of high risk medication and harmful drug interactions in the elderly.        Aspirin is not on active medication list.  Aspirin use is contraindicated for this patient due to: current dual platelet inhibitor therapy.  .    Patient Active Problem List   Diagnosis    Hypertension, essential    Hyperlipidemia, mixed    Borderline diabetes    Acute osteomyelitis of foot    Diverticular disease of colon    High risk medication use    History of colonic polyps    Long term (current) use of anticoagulants    Low HDL (under 40)    Vitamin B12 deficiency    Neuropathy    Primary osteoarthritis involving multiple joints    Ventricular premature beats    Vitamin D deficiency    Hyperglycemia    Acquired hypothyroidism    Low back pain    Medicare annual wellness visit, subsequent    Chronic kidney disease, stage 3a    Morbid (severe) obesity due to excess calories    Atrial fibrillation, chronic    Abnormal gait    Elevated PSA    Acute cough    Acute non-recurrent maxillary sinusitis    Anemia     Advance Care Planning Advance Directive is not on file.  ACP discussion was held with the patient during this visit. Patient has an advance directive (not in EMR), copy requested.            Objective   There were no vitals filed for this visit.    Estimated body mass index is 26.83 kg/m² as calculated from the following:    Height as of 12/27/24: 188 cm (74\").    Weight as of 12/27/24: 94.8 kg (209 lb).                Does the patient have evidence of cognitive impairment? No                                                                        "                         Health  Risk Assessment    Smoking Status:  Social History     Tobacco Use   Smoking Status Former    Current packs/day: 0.00    Average packs/day: 1 pack/day for 40.0 years (40.0 ttl pk-yrs)    Types: Cigarettes, Cigars    Start date:     Quit date:     Years since quittin.2    Passive exposure: Current   Smokeless Tobacco Never   Tobacco Comments    1 cigar/day, 2.40 years     Alcohol Consumption:  Social History     Substance and Sexual Activity   Alcohol Use Not Currently    Comment: about every 1-2 years he jania have a drink       Fall Risk Screen  STEADI Fall Risk Assessment was completed, and patient is at HIGH risk for falls. Assessment completed on:3/27/2025    Depression Screening   Little interest or pleasure in doing things? Not at all   Feeling down, depressed, or hopeless? Not at all   PHQ-2 Total Score 0      Health Habits and Functional and Cognitive Screening:      3/27/2025    11:22 AM   Functional & Cognitive Status   Do you have difficulty preparing food and eating? No   Do you have difficulty bathing yourself, getting dressed or grooming yourself? No   Do you have difficulty using the toilet? No   Do you have difficulty moving around from place to place? Yes   Do you have trouble with steps or getting out of a bed or a chair? Yes   Current Diet Well Balanced Diet   Dental Exam Up to date   Eye Exam Up to date   Exercise (times per week) 0 times per week   Current Exercises Include No Regular Exercise;Walking   Do you need help using the phone?  No   Are you deaf or do you have serious difficulty hearing?  No   Do you need help to go to places out of walking distance? Yes   Do you need help shopping? Yes   Do you need help preparing meals?  No   Do you need help with housework?  Yes   Do you need help with laundry? Yes   Do you need help taking your medications? No   Do you need help managing money? No   Do you ever drive or ride in a car without wearing a  seat belt? No   Have you felt unusual stress, anger or loneliness in the last month? No   Who do you live with? Spouse   If you need help, do you have trouble finding someone available to you? No   Have you been bothered in the last four weeks by sexual problems? No   Do you have difficulty concentrating, remembering or making decisions? No           Age-appropriate Screening Schedule:  Refer to the list below for future screening recommendations based on patient's age, sex and/or medical conditions. Orders for these recommended tests are listed in the plan section. The patient has been provided with a written plan.    Health Maintenance List  Health Maintenance   Topic Date Due    RSV Vaccine - Adults (1 - 1-dose 75+ series) Never done    ZOSTER VACCINE (2 of 2) 09/01/2017    INFLUENZA VACCINE  07/01/2024    COVID-19 Vaccine (3 - 2024-25 season) 09/01/2024    TDAP/TD VACCINES (2 - Td or Tdap) 12/29/2026 (Originally 11/1/2023)    LIPID PANEL  09/19/2025    ANNUAL WELLNESS VISIT  03/27/2026    Pneumococcal Vaccine 50+  Completed    HEMOGLOBIN A1C  Discontinued                                                                                                                                                CMS Preventative Services Quick Reference  Risk Factors Identified During Encounter  None Identified    The above risks/problems have been discussed with the patient.  Pertinent information has been shared with the patient in the After Visit Summary.  An After Visit Summary and PPPS were made available to the patient.    Follow Up:   Next Medicare Wellness visit to be scheduled in 1 year.         Additional E&M Note during same encounter follows:  Patient has additional, significant, and separately identifiable condition(s)/problem(s) that require work above and beyond the Medicare Wellness Visit     Chief Complaint  Primary Care Follow-Up and Prostate Cancer    Subjective   HPI      Review of Systems   Constitutional:  Negative.    HENT: Negative.     Eyes: Negative.    Respiratory: Negative.     Cardiovascular: Negative.    Gastrointestinal: Negative.    Neurological: Negative.               Objective   Vital Signs:  There were no vitals taken for this visit.  Physical Exam  Vitals and nursing note reviewed.   Pulmonary:      Effort: Pulmonary effort is normal.   Musculoskeletal:      Cervical back: Normal range of motion.   Feet:      Comments:      Neurological:      General: No focal deficit present.      Mental Status: He is alert and oriented to person, place, and time. Mental status is at baseline.   Psychiatric:         Mood and Affect: Mood normal.         Behavior: Behavior normal.         Thought Content: Thought content normal.         Judgment: Judgment normal.                    Assessment and Plan Additional age appropriate preventative wellness advice topics were discussed during today's preventative wellness exam(some topics already addressed during AWV portion of the note above):    Physical Activity: Advised cardiovascular activity 150 minutes per week as tolerated. (example brisk walk for 30 minutes, 5 days a week).     Nutrition: Discussed nutrition plan with patient. Information shared in after visit summary. Goal is for a well balanced diet to enhance overall health.     Healthy Weight: Discussed current and goal BMI with patient. Steps to attain this goal discussed. Information shared in after visit summary.     Medicare annual wellness visit, subsequent  We discussed health maintenance, diet, exercise, and immunizations.    Orders:    Comprehensive Metabolic Panel; Future    Hemoglobin A1c; Future    Lipid Panel; Future    CBC & Differential; Future    Vitamin B12; Future    Vitamin D,25-Hydroxy; Future    TSH Rfx On Abnormal To Free T4; Future    Measles / Mumps / Rubella Immunity; Future    Hypertension, essential  Discussed with patient to monitor their blood pressure and if systolic blood pressure goes  above 140 or diastolic is above 90 to return to clinic.  Take medicines as directed, call for any problems, patient not having or any worrisome symptoms.        Orders:    Comprehensive Metabolic Panel; Future    Hemoglobin A1c; Future    Lipid Panel; Future    CBC & Differential; Future    Vitamin B12; Future    Vitamin D,25-Hydroxy; Future    TSH Rfx On Abnormal To Free T4; Future    Measles / Mumps / Rubella Immunity; Future    Hyperlipidemia, mixed   BW in May    Orders:    Comprehensive Metabolic Panel; Future    Hemoglobin A1c; Future    Lipid Panel; Future    CBC & Differential; Future    Vitamin B12; Future    Vitamin D,25-Hydroxy; Future    TSH Rfx On Abnormal To Free T4; Future    Measles / Mumps / Rubella Immunity; Future    Vitamin B12 deficiency  BW    Orders:    Comprehensive Metabolic Panel; Future    Hemoglobin A1c; Future    Lipid Panel; Future    CBC & Differential; Future    Vitamin B12; Future    Vitamin D,25-Hydroxy; Future    TSH Rfx On Abnormal To Free T4; Future    Measles / Mumps / Rubella Immunity; Future    Vitamin D deficiency  BW    Orders:    Comprehensive Metabolic Panel; Future    Hemoglobin A1c; Future    Lipid Panel; Future    CBC & Differential; Future    Vitamin B12; Future    Vitamin D,25-Hydroxy; Future    TSH Rfx On Abnormal To Free T4; Future    Measles / Mumps / Rubella Immunity; Future    Neuropathy  RF Gabapentin    Orders:    Comprehensive Metabolic Panel; Future    Hemoglobin A1c; Future    Lipid Panel; Future    CBC & Differential; Future    Vitamin B12; Future    Vitamin D,25-Hydroxy; Future    TSH Rfx On Abnormal To Free T4; Future    Measles / Mumps / Rubella Immunity; Future    Hyperglycemia    Orders:    Hemoglobin A1c; Future            Follow Up   Return in about 6 weeks (around 5/8/2025) for Annual physical, Bloodwork 1 week prior to next appointment.  Patient was given instructions and counseling regarding his condition or for health maintenance advice. Please  see specific information pulled into the AVS if appropriate.

## 2025-03-27 NOTE — ASSESSMENT & PLAN NOTE
We discussed health maintenance, diet, exercise, and immunizations.    Orders:    Comprehensive Metabolic Panel; Future    Hemoglobin A1c; Future    Lipid Panel; Future    CBC & Differential; Future    Vitamin B12; Future    Vitamin D,25-Hydroxy; Future    TSH Rfx On Abnormal To Free T4; Future    Measles / Mumps / Rubella Immunity; Future

## 2025-03-27 NOTE — ASSESSMENT & PLAN NOTE
BW    Orders:    Comprehensive Metabolic Panel; Future    Hemoglobin A1c; Future    Lipid Panel; Future    CBC & Differential; Future    Vitamin B12; Future    Vitamin D,25-Hydroxy; Future    TSH Rfx On Abnormal To Free T4; Future    Measles / Mumps / Rubella Immunity; Future

## 2025-03-27 NOTE — ASSESSMENT & PLAN NOTE
RF Gabapentin    Orders:    Comprehensive Metabolic Panel; Future    Hemoglobin A1c; Future    Lipid Panel; Future    CBC & Differential; Future    Vitamin B12; Future    Vitamin D,25-Hydroxy; Future    TSH Rfx On Abnormal To Free T4; Future    Measles / Mumps / Rubella Immunity; Future

## 2025-03-28 ENCOUNTER — TELEPHONE (OUTPATIENT)
Dept: FAMILY MEDICINE CLINIC | Facility: CLINIC | Age: 85
End: 2025-03-28

## 2025-03-28 NOTE — TELEPHONE ENCOUNTER
[facility] CALLED REQUESTING A VERBAL OKAY FOR   1 WEEK 1   2 WEEK 1  AND 1 WEEK 7  CALLBACK: [phone] LESLIE MACIEL

## 2025-04-02 ENCOUNTER — TELEPHONE (OUTPATIENT)
Dept: FAMILY MEDICINE CLINIC | Facility: CLINIC | Age: 85
End: 2025-04-02
Payer: MEDICARE

## 2025-04-02 ENCOUNTER — TELEPHONE (OUTPATIENT)
Dept: PHARMACY | Facility: HOSPITAL | Age: 85
End: 2025-04-02

## 2025-04-02 NOTE — TELEPHONE ENCOUNTER
[facility] CALLED REQUESTING VERBAL OKAY FOR OT FOR 1 WEEK 4     CALLBACK: [phone] LESLIE MACIEL

## 2025-04-02 NOTE — TELEPHONE ENCOUNTER
Patient completed warfarin therapy on 2.19.25. Medication was discontinued by Buddy Javier MD. Closing episode of care.     Ana Mason CPhT, [facility]   11:55 EDT   4/2/2025

## 2025-04-07 DIAGNOSIS — G62.9 NEUROPATHY: ICD-10-CM

## 2025-04-07 NOTE — TELEPHONE ENCOUNTER
Caller: el nieto    Relationship: Emergency Contact    Best call back number: [phone]    Requested Prescriptions:   Requested Prescriptions     Pending Prescriptions Disp Refills    gabapentin (NEURONTIN) 600 MG tablet 60 tablet 0     Sig: Take 2 tablets by mouth 3 (Three) Times a Day.        Pharmacy where request should be sent: [facility] [id number] [address] AT [facility] [address] - [phone]  - [phone] FX     Last office visit with prescribing clinician: 6/26/2024   Last telemedicine visit with prescribing clinician: 3/27/2025   Next office visit with prescribing clinician: 5/28/2025     Additional details provided by patient:     Does the patient have less than a 3 day supply:  [x] Yes  [] No    Would you like a call back once the refill request has been completed: [] Yes [x] No    If the office needs to give you a call back, can they leave a voicemail: [] Yes [x] No    Brigette Bell Rep   04/07/25 09:12 EDT

## 2025-04-08 RX ORDER — GABAPENTIN 600 MG/1
1200 TABLET ORAL 3 TIMES DAILY
Qty: 60 TABLET | Refills: 0 | OUTPATIENT
Start: 2025-04-08

## 2025-04-08 NOTE — TELEPHONE ENCOUNTER
Patient wife called back about refill. Explained dr pa is out we can get sent over Thursday on his return.. patient has enough until Thursday

## 2025-04-10 ENCOUNTER — TELEPHONE (OUTPATIENT)
Dept: FAMILY MEDICINE CLINIC | Facility: CLINIC | Age: 85
End: 2025-04-10
Payer: MEDICARE

## 2025-04-10 DIAGNOSIS — G62.9 NEUROPATHY: ICD-10-CM

## 2025-04-10 RX ORDER — GABAPENTIN 600 MG/1
1200 TABLET ORAL 3 TIMES DAILY
Qty: 90 TABLET | Refills: 2 | Status: SHIPPED | OUTPATIENT
Start: 2025-04-10

## 2025-04-21 ENCOUNTER — TELEPHONE (OUTPATIENT)
Dept: FAMILY MEDICINE CLINIC | Facility: CLINIC | Age: 85
End: 2025-04-21
Payer: MEDICARE

## 2025-04-21 DIAGNOSIS — G62.9 NEUROPATHY: ICD-10-CM

## 2025-04-21 RX ORDER — GABAPENTIN 600 MG/1
1200 TABLET ORAL 3 TIMES DAILY
Qty: 180 TABLET | Refills: 2 | Status: SHIPPED | OUTPATIENT
Start: 2025-04-21

## 2025-04-21 NOTE — TELEPHONE ENCOUNTER
His medication states two pills 3 times a day 90 was all that was sent please reorder will the right amount of

## 2025-04-21 NOTE — TELEPHONE ENCOUNTER
Caller: el nieto    Relationship: Emergency Contact    Best call back number: [phone]     Which medication are you concerned about: gabapentin (NEURONTIN) 600 MG tablet     Who prescribed you this medication: DR SHI     When did you start taking this medication: FOR YEARS     What are your concerns: THE PATIENT'S WIFE ADVISED THE PATIENT TAKES THIS MEDICATION 2 TABLETS 3 TIMES PER DAY, BUT REPORTS THAT THIS MEDICATION WAS CALLED IN AT ONLY A 90 TABLET INSTEAD  TABLETS PER MONTH. THE PATIENT'S WIFE STATED THE PATIENT PICKED UP THE MEDICATION BUT IS REQUESTING THIS TO BE RE-SENT TO [facility] (NOT [facility]) SO THE PATIENT DOES NOT RUN OUT OF MEDICATION EARLY.     How long have you had these concerns: SINCE 04/10/2025    PLEASE CALL AND ADVISE

## 2025-04-22 ENCOUNTER — TELEPHONE (OUTPATIENT)
Dept: FAMILY MEDICINE CLINIC | Facility: CLINIC | Age: 85
End: 2025-04-22
Payer: MEDICARE

## 2025-04-22 RX ORDER — POTASSIUM CHLORIDE 1500 MG/1
20 TABLET, EXTENDED RELEASE ORAL 2 TIMES DAILY
Qty: 180 TABLET | Refills: 0 | Status: SHIPPED | OUTPATIENT
Start: 2025-04-22

## 2025-04-22 RX ORDER — ROSUVASTATIN CALCIUM 20 MG/1
20 TABLET, COATED ORAL DAILY
Qty: 90 TABLET | Refills: 0 | Status: SHIPPED | OUTPATIENT
Start: 2025-04-22

## 2025-04-22 NOTE — TELEPHONE ENCOUNTER
Caller: Kahlil Houserna    Relationship: Emergency Contact    Best call back number:   Telephone Information:   Mobile [phone]        Requested Prescriptions:   Requested Prescriptions     Pending Prescriptions Disp Refills    rosuvastatin (CRESTOR) 20 MG tablet 90 tablet 0     Sig: Take 1 tablet by mouth Daily.    potassium chloride (KLOR-CON M20) 20 MEQ CR tablet       Sig: Take 1 tablet by mouth 2 (Two) Times a Day.        Pharmacy where request should be sent: [facility] [id number] [address] AT [facility] [address] - [phone] [facility] [phone] FX     Last office visit with prescribing clinician: 6/26/2024   Last telemedicine visit with prescribing clinician: 3/27/2025   Next office visit with prescribing clinician: 5/28/2025     Additional details provided by patient:     Does the patient have less than a 3 day supply:  [x] Yes  [] No    Would you like a call back once the refill request has been completed: [] Yes [x] No    If the office needs to give you a call back, can they leave a voicemail: [] Yes [x] No    Brigette Bell Rep   04/22/25 12:51 EDT

## 2025-04-22 NOTE — TELEPHONE ENCOUNTER
[facility] MELISSA LAURENT  [phone]        0T CONTINUE 2 MORE VISITS VERBAL CONSENT

## 2025-04-22 NOTE — TELEPHONE ENCOUNTER
Caller: Lizabeth Houser    Relationship: Emergency Contact    Best call back number: [phone]    What medication are you requesting: FEROSUL 350MG      Have you had these symptoms before:    [x] Yes  [] No    Have you been treated for these symptoms before:   [x] Yes  [] No    If a prescription is needed, what is your preferred pharmacy and phone number: [facility] [id number] - [place], [place] - [address] [facility] AT [facility] [address] - [phone]  - [phone] FX     Additional notes: HUB COULD NOT FIND ON MED LIST

## 2025-04-30 ENCOUNTER — TELEPHONE (OUTPATIENT)
Dept: FAMILY MEDICINE CLINIC | Facility: CLINIC | Age: 85
End: 2025-04-30
Payer: MEDICARE

## 2025-05-07 ENCOUNTER — TELEPHONE (OUTPATIENT)
Dept: FAMILY MEDICINE CLINIC | Facility: CLINIC | Age: 85
End: 2025-05-07
Payer: MEDICARE

## 2025-05-08 ENCOUNTER — TELEPHONE (OUTPATIENT)
Dept: CARDIOLOGY | Facility: CLINIC | Age: 85
End: 2025-05-08
Payer: MEDICARE

## 2025-05-08 NOTE — TELEPHONE ENCOUNTER
Caller: Lizabeth Houser    Relationship: Emergency Contact    Best call back number: [phone]     Which medication are you concerned about: dabigatran etexilate (PRADAXA) 150 MG capsu [[id number]] (Order [id number])    Who prescribed you this medication: Buddy Javier MD     When did you start taking this medication: A FEW MONTHS     What are your concerns: THIS IS $127 A MONTH AT THE PHARM, AT ONE TIME OUR OFFICE DID A TIER DROP AND THIS WAS CHEAPER. ASKING FOR THIS MEDICATION TO BE DROPPED AGAIN SO THAT IT IS MORE AFFORDABLE. THIS WAS $47 BUT PT WAS NOT HOME AND UNABLE TO PICK U AT THAT TIME.     PT HAS SEVERAL PILLS LEFT, JUST WANTED TO CHECK ON THIS NOW

## 2025-05-15 NOTE — TELEPHONE ENCOUNTER
Caller: Lizabeth Houser    Relationship: Emergency Contact    Best call back number: [phone]    What is the best time to reach you: ANY    Who are you requesting to speak with (clinical staff, provider,  specific staff member): CLINICAL    What was the call regarding: PATIENTS SPOUSE CALLED BACK AGAIN TO CHECK ON THE STATUS OF THIS. PLEASE CALL HER BACK AS SOON AS POSSIBLE. THANK YOU    Is it okay if the provider responds through [employer]: PLEASE CALL

## 2025-05-20 RX ORDER — ROSUVASTATIN CALCIUM 20 MG/1
20 TABLET, COATED ORAL DAILY
Qty: 90 TABLET | Refills: 0 | Status: SHIPPED | OUTPATIENT
Start: 2025-05-20

## 2025-05-23 ENCOUNTER — TELEPHONE (OUTPATIENT)
Dept: FAMILY MEDICINE CLINIC | Facility: CLINIC | Age: 85
End: 2025-05-23
Payer: MEDICARE

## 2025-05-23 ENCOUNTER — LAB (OUTPATIENT)
Dept: FAMILY MEDICINE CLINIC | Facility: CLINIC | Age: 85
End: 2025-05-23
Payer: MEDICARE

## 2025-05-23 DIAGNOSIS — G62.9 NEUROPATHY: ICD-10-CM

## 2025-05-23 DIAGNOSIS — I10 HYPERTENSION, ESSENTIAL: ICD-10-CM

## 2025-05-23 DIAGNOSIS — Z00.00 MEDICARE ANNUAL WELLNESS VISIT, SUBSEQUENT: ICD-10-CM

## 2025-05-23 DIAGNOSIS — R73.9 HYPERGLYCEMIA: ICD-10-CM

## 2025-05-23 DIAGNOSIS — E53.8 VITAMIN B12 DEFICIENCY: ICD-10-CM

## 2025-05-23 DIAGNOSIS — E78.2 HYPERLIPIDEMIA, MIXED: ICD-10-CM

## 2025-05-23 DIAGNOSIS — E55.9 VITAMIN D DEFICIENCY: ICD-10-CM

## 2025-05-24 LAB
25(OH)D3+25(OH)D2 SERPL-MCNC: 67.2 NG/ML (ref 30–100)
ALBUMIN SERPL-MCNC: 4.1 G/DL (ref 3.7–4.7)
ALP SERPL-CCNC: 64 IU/L (ref 44–121)
ALT SERPL-CCNC: 8 IU/L (ref 0–44)
AST SERPL-CCNC: 19 IU/L (ref 0–40)
BASOPHILS # BLD AUTO: 0 X10E3/UL (ref 0–0.2)
BASOPHILS NFR BLD AUTO: 0 %
BILIRUB SERPL-MCNC: 0.2 MG/DL (ref 0–1.2)
BUN SERPL-MCNC: 25 MG/DL (ref 8–27)
BUN/CREAT SERPL: 20 (ref 10–24)
CALCIUM SERPL-MCNC: 9.7 MG/DL (ref 8.6–10.2)
CHLORIDE SERPL-SCNC: 101 MMOL/L (ref 96–106)
CHOLEST SERPL-MCNC: 133 MG/DL (ref 100–199)
CO2 SERPL-SCNC: 21 MMOL/L (ref 20–29)
CREAT SERPL-MCNC: 1.27 MG/DL (ref 0.76–1.27)
EGFRCR SERPLBLD CKD-EPI 2021: 55 ML/MIN/1.73
EOSINOPHIL # BLD AUTO: 0.1 X10E3/UL (ref 0–0.4)
EOSINOPHIL NFR BLD AUTO: 2 %
ERYTHROCYTE [DISTWIDTH] IN BLOOD BY AUTOMATED COUNT: 14 % (ref 11.6–15.4)
GLOBULIN SER CALC-MCNC: 3.5 G/DL (ref 1.5–4.5)
GLUCOSE SERPL-MCNC: 94 MG/DL (ref 70–99)
HBA1C MFR BLD: 5.9 % (ref 4.8–5.6)
HCT VFR BLD AUTO: 33.8 % (ref 37.5–51)
HDLC SERPL-MCNC: 36 MG/DL
HGB BLD-MCNC: 10.7 G/DL (ref 13–17.7)
IMM GRANULOCYTES # BLD AUTO: 0 X10E3/UL (ref 0–0.1)
IMM GRANULOCYTES NFR BLD AUTO: 0 %
LDLC SERPL CALC-MCNC: 66 MG/DL (ref 0–99)
LYMPHOCYTES # BLD AUTO: 1.3 X10E3/UL (ref 0.7–3.1)
LYMPHOCYTES NFR BLD AUTO: 28 %
MCH RBC QN AUTO: 29.3 PG (ref 26.6–33)
MCHC RBC AUTO-ENTMCNC: 31.7 G/DL (ref 31.5–35.7)
MCV RBC AUTO: 93 FL (ref 79–97)
MEV IGG SER IA-ACNC: 157 AU/ML
MONOCYTES # BLD AUTO: 0.5 X10E3/UL (ref 0.1–0.9)
MONOCYTES NFR BLD AUTO: 12 %
MUV IGG SER IA-ACNC: >300 AU/ML
NEUTROPHILS # BLD AUTO: 2.6 X10E3/UL (ref 1.4–7)
NEUTROPHILS NFR BLD AUTO: 58 %
PLATELET # BLD AUTO: 201 X10E3/UL (ref 150–450)
POTASSIUM SERPL-SCNC: 4.9 MMOL/L (ref 3.5–5.2)
PROT SERPL-MCNC: 7.6 G/DL (ref 6–8.5)
RBC # BLD AUTO: 3.65 X10E6/UL (ref 4.14–5.8)
RUBV IGG SERPL IA-ACNC: 27.1 INDEX
SODIUM SERPL-SCNC: 140 MMOL/L (ref 134–144)
TRIGL SERPL-MCNC: 182 MG/DL (ref 0–149)
TSH SERPL DL<=0.005 MIU/L-ACNC: 2.75 UIU/ML (ref 0.45–4.5)
VIT B12 SERPL-MCNC: 263 PG/ML (ref 232–1245)
VLDLC SERPL CALC-MCNC: 31 MG/DL (ref 5–40)
WBC # BLD AUTO: 4.5 X10E3/UL (ref 3.4–10.8)

## 2025-05-27 NOTE — ASSESSMENT & PLAN NOTE
Discussed with patient to monitor their blood pressure and if systolic blood pressure goes above 140 or diastolic is above 90 to return to clinic.  Take medicines as directed, call for any problems, patient not having or any worrisome symptoms.        SmartLink not supported outside of the Encounter Diagnoses SmartSection.

## 2025-05-27 NOTE — PROGRESS NOTES
Patient Name: Kurt Houser  : 1940   MRN: [id number]     Chief Complaint:    Chief Complaint   Patient presents with    Hyperlipidemia    Hypothyroidism    Hypertension       History of Present Illness: Kurt Houser is a 85 y.o. male who is here today for follow up on blood sugar, cholesterol, blood count vitamin D  HPI        Review of Systems:   Review of Systems   Constitutional: Negative.    HENT: Negative.     Eyes: Negative.    Respiratory: Negative.     Cardiovascular: Negative.    Gastrointestinal: Negative.    Neurological: Negative.         Past Medical History:   Past Medical History:   Diagnosis Date    Acute osteomyelitis of foot     Amputated toe of left foot     Anemia of decreased vitamin B12 absorption     BMI 27.0-27.9,adult     Degenerative joint disease involving multiple joints     Diverticular disease of colon     Drug therapy     Dyslipidemia     Essential hypertension     Foot osteomyelitis     Frequent PVCs     Glaucoma     High risk medication use     History of adenomatous polyp of colon     Long term current use of anticoagulant     Low density lipoprotein (LDL) cholesterol level below 100mg/dL     Mixed hyperlipidemia     Neuropathy     Osteoarthritis     Paroxysmal atrial fibrillation     Polyneuropathy     Prediabetes     Ventricular premature beats     Vitamin B12 deficiency     Vitamin D deficiency        Past Surgical History:   Past Surgical History:   Procedure Laterality Date    CATARACT EXTRACTION, BILATERAL      FOOT SURGERY Left     TRANS METATARSAL AMPUTATION  2020    5th metatarsal amputation/IV abx    WRIST SURGERY Left        Family History:   Family History   Problem Relation Age of Onset    Colon cancer Father     Liver cancer Brother        Social History:   Social History     Socioeconomic History    Marital status:    Tobacco Use    Smoking status: Former     Current packs/day: 0.00     Average packs/day: 1 pack/day for 40.0 years  (40.0 ttl pk-yrs)     Types: Cigarettes, Cigars     Start date:      Quit date:      Years since quittin.4     Passive exposure: Current    Smokeless tobacco: Never    Tobacco comments:     1 cigar/day, 2.40 years   Vaping Use    Vaping status: Never Used   Substance and Sexual Activity    Alcohol use: Not Currently     Comment: about every 1-2 years he mighst have a drink    Drug use: Never    Sexual activity: Defer       Medications:     Current Outpatient Medications:     amLODIPine (NORVASC) 5 MG tablet, Take 1 tablet by mouth once daily, Disp: 90 tablet, Rfl: 0    Coenzyme Q10 (CoQ-10) 100 MG capsule, Take  by mouth Daily., Disp: , Rfl:     Cyanocobalamin (B-12) 1000 MCG capsule, Take 1,000 mg by mouth Daily., Disp: 90 capsule, Rfl: 1    dabigatran etexilate (PRADAXA) 150 MG capsu, Take 1 capsule by mouth 2 (Two) Times a Day., Disp: 180 capsule, Rfl: 3    gabapentin (NEURONTIN) 600 MG tablet, Take 2 tablets by mouth 3 (Three) Times a Day., Disp: 180 tablet, Rfl: 2    levothyroxine (SYNTHROID, LEVOTHROID) 25 MCG tablet, Take 1 tablet by mouth once daily, Disp: 90 tablet, Rfl: 0    lisinopril (PRINIVIL,ZESTRIL) 40 MG tablet, Take 1 tablet by mouth once daily, Disp: 90 tablet, Rfl: 0    Magnesium 250 MG tablet, Take 1 tablet by mouth Daily., Disp: , Rfl:     Omega-3 Fatty Acids (fish oil) 1000 MG capsule capsule, Take  by mouth Daily With Breakfast., Disp: , Rfl:     oxyCODONE (ROXICODONE) 5 MG immediate release tablet, Take 1 tablet by mouth Every 4 (Four) Hours As Needed for Moderate Pain., Disp: , Rfl:     potassium chloride (KLOR-CON M20) 20 MEQ CR tablet, Take 1 tablet by mouth 2 (Two) Times a Day., Disp: 180 tablet, Rfl: 0    rosuvastatin (CRESTOR) 20 MG tablet, TAKE 1 TABLET BY MOUTH DAILY, Disp: 90 tablet, Rfl: 0    vitamin D3 125 MCG (5000 UT) capsule capsule, Take 1 capsule by mouth once daily, Disp: 90 capsule, Rfl: 0    Allergies:   Allergies   Allergen Reactions    Acetaminophen Mental  "Status Change    Hydrocodone Bitart (Antituss) [Hydrocodone] Mental Status Change    Loratadine Hives         Physical Exam:  Vital Signs:   Vitals:    05/28/25 1454   BP: 124/82   BP Location: Right arm   Patient Position: Sitting   Cuff Size: Adult   Pulse: 72   Resp: 16   SpO2: 98%   Weight: 90.7 kg (199 lb 14.4 oz)   Height: 188 cm (74.02\")   PainSc: 4    PainLoc: Generalized     Body mass index is 25.65 kg/m².     Physical Exam  Vitals and nursing note reviewed.   Constitutional:       Appearance: Normal appearance. He is normal weight.   HENT:      Head: Normocephalic and atraumatic.      Right Ear: Tympanic membrane, ear canal and external ear normal.      Left Ear: Tympanic membrane, ear canal and external ear normal.      Nose: Nose normal.      Mouth/Throat:      Mouth: Mucous membranes are dry.      Pharynx: Oropharynx is clear.   Eyes:      Extraocular Movements: Extraocular movements intact.      Conjunctiva/sclera: Conjunctivae normal.      Pupils: Pupils are equal, round, and reactive to light.   Cardiovascular:      Rate and Rhythm: Normal rate and regular rhythm.      Pulses: Normal pulses.      Heart sounds: Normal heart sounds.   Pulmonary:      Effort: Pulmonary effort is normal.      Breath sounds: Normal breath sounds.   Musculoskeletal:      Cervical back: Normal range of motion and neck supple.   Feet:      Comments:      Neurological:      Mental Status: He is alert.         Procedures      Assessment/Plan:   Diagnoses and all orders for this visit:    1. Hypertension, essential (Primary)  Assessment & Plan:  Discussed with patient to monitor their blood pressure and if systolic blood pressure goes above 140 or diastolic is above 90 to return to clinic.  Take medicines as directed, call for any problems, patient not having or any worrisome symptoms.        SmartLink not supported outside of the Encounter Diagnoses SmartSection.        2. Hyperlipidemia, mixed  Assessment & Plan:  HDL 36.  " LDL 66.  Triglycerides 182.      3. Hyperglycemia  Assessment & Plan:  Hemoglobin A1c is 5.9.  Recheck in 6 months.      4. Morbid (severe) obesity due to excess calories  Assessment & Plan:  Patient is a little overweight.  I am worried with him losing weight because of feel he would not have enough metabolic reserves to find the infection.  We will continue to monitor.      5. Vitamin B12 deficiency  Assessment & Plan:  B12 was 263.  Will follow.      6. Vitamin D deficiency  Assessment & Plan:  Vitamin D 67.2.  Will follow-up.      7. Anemia, unspecified type  Assessment & Plan:  This has been relatively stable.  Hemoglobin and hematocrit did get down to 10.7 at 33.8.      8. Neuropathy  Assessment & Plan:  RF Gabapentin    SmartLink not supported outside of the Encounter Diagnoses SmartSection.                 Follow Up:   Return in about 3 months (around 8/28/2025) for Recheck.      Leo Cordova MD  [facility]

## 2025-05-28 ENCOUNTER — OFFICE VISIT (OUTPATIENT)
Dept: FAMILY MEDICINE CLINIC | Facility: CLINIC | Age: 85
End: 2025-05-28
Payer: MEDICARE

## 2025-05-28 VITALS
HEART RATE: 72 BPM | OXYGEN SATURATION: 98 % | RESPIRATION RATE: 16 BRPM | DIASTOLIC BLOOD PRESSURE: 82 MMHG | HEIGHT: 74 IN | WEIGHT: 199.9 LBS | BODY MASS INDEX: 25.65 KG/M2 | SYSTOLIC BLOOD PRESSURE: 124 MMHG

## 2025-05-28 DIAGNOSIS — E55.9 VITAMIN D DEFICIENCY: ICD-10-CM

## 2025-05-28 DIAGNOSIS — E78.2 HYPERLIPIDEMIA, MIXED: ICD-10-CM

## 2025-05-28 DIAGNOSIS — I10 HYPERTENSION, ESSENTIAL: Primary | ICD-10-CM

## 2025-05-28 DIAGNOSIS — R73.9 HYPERGLYCEMIA: ICD-10-CM

## 2025-05-28 DIAGNOSIS — E66.01 MORBID (SEVERE) OBESITY DUE TO EXCESS CALORIES: ICD-10-CM

## 2025-05-28 DIAGNOSIS — D64.9 ANEMIA, UNSPECIFIED TYPE: ICD-10-CM

## 2025-05-28 DIAGNOSIS — G62.9 NEUROPATHY: ICD-10-CM

## 2025-05-28 DIAGNOSIS — E53.8 VITAMIN B12 DEFICIENCY: ICD-10-CM

## 2025-05-28 RX ORDER — GABAPENTIN 600 MG/1
1200 TABLET ORAL 3 TIMES DAILY
Qty: 180 TABLET | Refills: 2 | Status: SHIPPED | OUTPATIENT
Start: 2025-05-28

## 2025-06-25 ENCOUNTER — OFFICE VISIT (OUTPATIENT)
Dept: CARDIOLOGY | Facility: CLINIC | Age: 85
End: 2025-06-25
Payer: MEDICARE

## 2025-06-25 VITALS
OXYGEN SATURATION: 99 % | SYSTOLIC BLOOD PRESSURE: 132 MMHG | DIASTOLIC BLOOD PRESSURE: 84 MMHG | RESPIRATION RATE: 18 BRPM | BODY MASS INDEX: 26.18 KG/M2 | WEIGHT: 204 LBS | HEIGHT: 74 IN | HEART RATE: 93 BPM

## 2025-06-25 DIAGNOSIS — I10 HYPERTENSION, ESSENTIAL: Primary | ICD-10-CM

## 2025-06-25 DIAGNOSIS — E78.2 HYPERLIPIDEMIA, MIXED: ICD-10-CM

## 2025-06-25 DIAGNOSIS — I48.0 PAROXYSMAL ATRIAL FIBRILLATION: ICD-10-CM

## 2025-06-25 PROBLEM — I48.20 ATRIAL FIBRILLATION, CHRONIC: Status: RESOLVED | Noted: 2023-05-30 | Resolved: 2025-06-25

## 2025-06-25 PROCEDURE — 1160F RVW MEDS BY RX/DR IN RCRD: CPT | Performed by: INTERNAL MEDICINE

## 2025-06-25 PROCEDURE — 3075F SYST BP GE 130 - 139MM HG: CPT | Performed by: INTERNAL MEDICINE

## 2025-06-25 PROCEDURE — 1159F MED LIST DOCD IN RCRD: CPT | Performed by: INTERNAL MEDICINE

## 2025-06-25 PROCEDURE — 3079F DIAST BP 80-89 MM HG: CPT | Performed by: INTERNAL MEDICINE

## 2025-06-25 PROCEDURE — 99214 OFFICE O/P EST MOD 30 MIN: CPT | Performed by: INTERNAL MEDICINE

## 2025-06-25 PROCEDURE — 93000 ELECTROCARDIOGRAM COMPLETE: CPT | Performed by: INTERNAL MEDICINE

## 2025-06-25 NOTE — PROGRESS NOTES
[facility] [place]  [facility]  [address] [place] [address]  Dept: [phone]  Dept Fax: [phone]    Kurt Houser  1940    Follow Up Office Visit Note    History of Present Illness:  Kurt Houser is a 85 y.o. male who presents to the clinic for Follow-up. PAF- He denies any complaints, he is on Pradaxa now 150 mg bid, EKG sinus no other meds, due to bradycardia, he was at the hospital with sepsis, from left leg EKG was good,     The following portions of the patient's history were reviewed and updated as appropriate: allergies, current medications, past family history, past medical history, past social history, past surgical history, and problem list.    Medications:  amLODIPine  dabigatran etexilate  gabapentin  levothyroxine  potassium chloride  rosuvastatin  vitamin D3 capsule    Subjective  Allergies   Allergen Reactions   • Acetaminophen Mental Status Change   • Hydrocodone Bitart (Antituss) [Hydrocodone] Mental Status Change   • Loratadine Hives        Past Medical History:   Diagnosis Date   • Acute osteomyelitis of foot    • Amputated toe of left foot    • Anemia of decreased vitamin B12 absorption    • BMI 27.0-27.9,adult    • Degenerative joint disease involving multiple joints    • Diverticular disease of colon    • Drug therapy    • Dyslipidemia    • Essential hypertension    • Foot osteomyelitis    • Frequent PVCs    • Glaucoma    • High risk medication use    • History of adenomatous polyp of colon    • Long term current use of anticoagulant    • Low density lipoprotein (LDL) cholesterol level below 100mg/dL    • Mixed hyperlipidemia    • Neuropathy    • Osteoarthritis    • Paroxysmal atrial fibrillation    • Polyneuropathy    • Prediabetes    • Ventricular premature beats    • Vitamin B12 deficiency    • Vitamin D deficiency        Past Surgical History:   Procedure Laterality Date   • CATARACT EXTRACTION, BILATERAL  2010   • FOOT SURGERY Left   "  • TRANS METATARSAL AMPUTATION  2020    5th metatarsal amputation/IV abx   • WRIST SURGERY Left        Family History   Problem Relation Age of Onset   • Colon cancer Father    • Liver cancer Brother         Social History     Socioeconomic History   • Marital status:    Tobacco Use   • Smoking status: Former     Current packs/day: 0.00     Average packs/day: 1 pack/day for 40.0 years (40.0 ttl pk-yrs)     Types: Cigarettes, Cigars     Start date:      Quit date:      Years since quittin.4     Passive exposure: Current   • Smokeless tobacco: Never   • Tobacco comments:     1 cigar/day, 2.40 years   Vaping Use   • Vaping status: Never Used   Substance and Sexual Activity   • Alcohol use: Not Currently     Comment: about every 1-2 years he mighst have a drink   • Drug use: Never   • Sexual activity: Defer       Review of Systems   Constitutional: Negative.    HENT: Negative.     Respiratory: Negative.     Cardiovascular: Negative.    Endocrine: Negative.    Genitourinary: Negative.    Musculoskeletal: Negative.    Skin: Negative.    Allergic/Immunologic: Negative.    Neurological: Negative.    Hematological: Negative.    Psychiatric/Behavioral: Negative.       Cardiovascular Procedures    ECHO/MUGA:  STRESS TESTS:   CARDIAC CATH:   DEVICES:   HOLTER:   CT/MRI:   VASCULAR:   CARDIOTHORACIC:     Objective  Vitals:    25 1407   BP: 132/84   BP Location: Right arm   Patient Position: Lying   Cuff Size: Adult   Pulse: 93   Resp: 18   SpO2: 99%   Weight: 92.5 kg (204 lb)   Height: 188 cm (74.02\")   PainSc: 0-No pain     Body mass index is 26.18 kg/m².     Physical Exam  Constitutional:       Appearance: Healthy appearance. Not in distress.   Neck:      Vascular: No JVR. JVD normal.   Pulmonary:      Effort: Pulmonary effort is normal.      Breath sounds: Normal breath sounds. No wheezing. No rhonchi. No rales.   Chest:      Chest wall: Not tender to palpatation.   Cardiovascular:      PMI at " left midclavicular line. Normal rate. Regular rhythm. Normal S1. Normal S2.       Murmurs: There is no murmur.      No gallop.  No click. No rub.   Pulses:     Intact distal pulses.   Edema:     Peripheral edema absent.   Abdominal:      General: Bowel sounds are normal.      Palpations: Abdomen is soft.      Tenderness: There is no abdominal tenderness.   Musculoskeletal: Normal range of motion.         General: No tenderness. Skin:     General: Skin is warm and dry.   Neurological:      General: No focal deficit present.      Mental Status: Alert and oriented to person, place and time.        Diagnostic Data    ECG 12 Lead    Date/Time: 6/25/2025 2:29 PM  Performed by: Buddy Javier MD    Authorized by: Buddy Javier MD  Comparison: compared with previous ECG from 12/27/2024  Similar to previous ECG  Rhythm: sinus rhythm  Rate: normal  BPM: 72  Conduction: 1st degree AV block  QRS axis: normal    Clinical impression: abnormal EKG        Assessment and Plan  Diagnoses and all orders for this visit:    Atrial fibrillation, - No complaints, On Pradaxa 150 mg bid,  in sinus no complaints,    Hypertension, essential- BP is 120.60 has lost over 20 pounds, his Lisinopril was dc only on Amlodipine 5mg    Hyperlipidemia, mixed- On Crestor 20 mg         Return in about 6 months (around 12/25/2025) for Recheck with Dr. Javier.    Buddy Javier MD  06/25/2025